# Patient Record
Sex: FEMALE | Race: WHITE | Employment: UNEMPLOYED | URBAN - METROPOLITAN AREA
[De-identification: names, ages, dates, MRNs, and addresses within clinical notes are randomized per-mention and may not be internally consistent; named-entity substitution may affect disease eponyms.]

---

## 2022-01-13 ENCOUNTER — VIRTUAL VISIT (OUTPATIENT)
Dept: INTERNAL MEDICINE | Age: 47
End: 2022-01-13
Payer: MEDICARE

## 2022-01-13 DIAGNOSIS — Z11.52 ENCOUNTER FOR SCREENING FOR COVID-19: Primary | ICD-10-CM

## 2022-01-13 PROCEDURE — G8427 DOCREV CUR MEDS BY ELIG CLIN: HCPCS | Performed by: NURSE PRACTITIONER

## 2022-01-13 PROCEDURE — 99202 OFFICE O/P NEW SF 15 MIN: CPT | Performed by: NURSE PRACTITIONER

## 2022-01-13 ASSESSMENT — ENCOUNTER SYMPTOMS
ABDOMINAL PAIN: 0
RHINORRHEA: 0
NAUSEA: 0
DIARRHEA: 0
SINUS PRESSURE: 0
SHORTNESS OF BREATH: 0
COUGH: 0
SORE THROAT: 0
SINUS PAIN: 0
WHEEZING: 0
VOMITING: 0

## 2022-01-13 NOTE — LETTER
NOTIFICATION RETURN TO WORK / SCHOOL    1/13/2022    Ms. Abad Shaffer  [address] [place] [address]      To Whom It May Concern:    Abad Shaffer was tested for COVID-19 on 1/13, and the result is pending. She may return to work to be determined upon results. I recommend: quarantine    If there are questions or concerns, please have the patient contact our office.         Sincerely,      NAIDA Wilcox NP

## 2022-01-13 NOTE — PROGRESS NOTES
Sherri Gu (:  1975) is a 48 y.o. female, New patient, here for evaluation of the following chief complaint(s):  Concern For COVID-19 (positive for covid at work, needs PCR)      ASSESSMENT/PLAN:  1. Encounter for screening for COVID-19  -     Covid-19 Ambulatory; Future    Will follow up with test results and additional recommendations as indicated. Reviewed usual course of illness, preventing the spread to others/isolation, and supportive measures for symptom management. Reviewed symptoms requiring ER. Patient verbalizes understanding. I have reviewed and updated the electronic medical record. Return if symptoms worsen or fail to improve, for follow up with PCP. SUBJECTIVE/OBJECTIVE:    Patient presents for virtual visit after testing positive for covid. She took a rapid test at work this morning. Her employer is requiring a PCR to confirm. She is asymptomatic at this time. 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 Review of Systems   Constitutional: Negative for chills, fatigue and fever. HENT: Negative for congestion, ear pain, postnasal drip, rhinorrhea, sinus pressure, sinus pain and sore throat. Respiratory: Negative for cough, shortness of breath and wheezing. Cardiovascular: Negative for chest pain. Gastrointestinal: Negative for abdominal pain, diarrhea, nausea and vomiting. Musculoskeletal: Negative for arthralgias and myalgias. Skin: Negative for rash. Neurological: Negative for headaches.        Physical Exam  PHYSICAL EXAMINATION:  [ INSTRUCTIONS:  \"[x]\" Indicates a positive item  \"[]\" Indicates a negative item  -- DELETE ALL ITEMS NOT EXAMINED]    [x] Alert  [x] Oriented to person/place/time      [x] No apparent distress      [x] Breathing appears normal      [x] Normal Mood      [] Poor short term memory  [] Poor long term memory    [] OTHER:      Due to this being a TeleHealth encounter, evaluation of the following organ systems is limited: Vitals/Constitutional/EENT/Resp/CV/GI//MS/Neuro/Skin/Heme-Lymph-Imm. Jayde Du is a 48 y.o. female being evaluated by a Virtual Visit (video visit) encounter to address concerns as mentioned above. A caregiver was present when appropriate. Due to this being a TeleHealth encounter (During ASWSZ-20 public health emergency), evaluation of the following organ systems was limited: Vitals/Constitutional/EENT/Resp/CV/GI//MS/Neuro/Skin/Heme-Lymph-Imm. Pursuant to the emergency declaration under the [address], [address] [place] [address] authority and the [employer] and [employer] Act, this Virtual Visit was conducted with patient's (and/or legal guardian's) consent, to reduce the patient's risk of exposure to COVID-19 and provide necessary medical care. The patient (and/or legal guardian) has also been advised to contact this office for worsening conditions or problems, and seek emergency medical treatment and/or call 911 if deemed necessary. Patient identification was verified at the start of the visit: Yes    Services were provided through a video synchronous discussion virtually to substitute for in-person clinic visit. Patient was located at home and provider was located in office or at home. An electronic signature was used to authenticate this note.     --NAIDA Ratliff

## 2022-01-14 DIAGNOSIS — Z11.52 ENCOUNTER FOR SCREENING FOR COVID-19: ICD-10-CM

## 2022-01-17 LAB
SARS-COV-2: NOT DETECTED
SOURCE: NORMAL

## 2022-01-18 ENCOUNTER — TELEPHONE (OUTPATIENT)
Dept: INTERNAL MEDICINE | Age: 47
End: 2022-01-18

## 2022-01-18 NOTE — TELEPHONE ENCOUNTER
Pt called requesting her results from her COVID test. She has been out of work since 1/13. She is also requesting a return to work letter.     Thank you

## 2022-01-20 NOTE — TELEPHONE ENCOUNTER
Perfect thank you! I tried contacting pt to let her know about letter. Phone message states number or code dialed is incorrect?   She may need to update phone number if she calls back    Thank you

## 2022-09-23 ENCOUNTER — OFFICE VISIT (OUTPATIENT)
Dept: INTERNAL MEDICINE | Age: 47
End: 2022-09-23
Payer: MEDICARE

## 2022-09-23 VITALS
RESPIRATION RATE: 16 BRPM | DIASTOLIC BLOOD PRESSURE: 72 MMHG | BODY MASS INDEX: 35.68 KG/M2 | WEIGHT: 209 LBS | OXYGEN SATURATION: 98 % | TEMPERATURE: 97.5 F | HEART RATE: 98 BPM | HEIGHT: 64 IN | SYSTOLIC BLOOD PRESSURE: 115 MMHG

## 2022-09-23 DIAGNOSIS — Z87.891 PERSONAL HISTORY OF TOBACCO USE: ICD-10-CM

## 2022-09-23 DIAGNOSIS — M54.41 CHRONIC RIGHT-SIDED LOW BACK PAIN WITH RIGHT-SIDED SCIATICA: ICD-10-CM

## 2022-09-23 DIAGNOSIS — G89.29 CHRONIC RIGHT-SIDED LOW BACK PAIN WITH RIGHT-SIDED SCIATICA: ICD-10-CM

## 2022-09-23 DIAGNOSIS — I99.8 FLUCTUATING BLOOD PRESSURE: ICD-10-CM

## 2022-09-23 DIAGNOSIS — I99.8 FLUCTUATING BLOOD PRESSURE: Primary | ICD-10-CM

## 2022-09-23 DIAGNOSIS — Z23 NEED FOR PNEUMOCOCCAL VACCINATION: ICD-10-CM

## 2022-09-23 LAB
ALBUMIN SERPL-MCNC: 4.5 G/DL (ref 3.5–4.6)
ALP BLD-CCNC: 58 U/L (ref 40–130)
ALT SERPL-CCNC: 19 U/L (ref 0–33)
ANION GAP SERPL CALCULATED.3IONS-SCNC: 13 MEQ/L (ref 9–15)
AST SERPL-CCNC: 14 U/L (ref 0–35)
BASOPHILS ABSOLUTE: 0.1 K/UL (ref 0–0.2)
BASOPHILS RELATIVE PERCENT: 0.9 %
BILIRUB SERPL-MCNC: 0.5 MG/DL (ref 0.2–0.7)
BUN BLDV-MCNC: 13 MG/DL (ref 6–20)
CALCIUM SERPL-MCNC: 9.6 MG/DL (ref 8.5–9.9)
CHLORIDE BLD-SCNC: 103 MEQ/L (ref 95–107)
CO2: 21 MEQ/L (ref 20–31)
CREAT SERPL-MCNC: 0.68 MG/DL (ref 0.5–0.9)
EOSINOPHILS ABSOLUTE: 0.3 K/UL (ref 0–0.7)
EOSINOPHILS RELATIVE PERCENT: 3.3 %
GFR AFRICAN AMERICAN: >60
GFR NON-AFRICAN AMERICAN: >60
GLOBULIN: 2.8 G/DL (ref 2.3–3.5)
GLUCOSE BLD-MCNC: 100 MG/DL (ref 70–99)
HCT VFR BLD CALC: 43.6 % (ref 37–47)
HEMOGLOBIN: 14.3 G/DL (ref 12–16)
LYMPHOCYTES ABSOLUTE: 2.3 K/UL (ref 1–4.8)
LYMPHOCYTES RELATIVE PERCENT: 26.9 %
MCH RBC QN AUTO: 30.9 PG (ref 27–31.3)
MCHC RBC AUTO-ENTMCNC: 32.9 % (ref 33–37)
MCV RBC AUTO: 94 FL (ref 82–100)
MONOCYTES ABSOLUTE: 0.7 K/UL (ref 0.2–0.8)
MONOCYTES RELATIVE PERCENT: 8.1 %
NEUTROPHILS ABSOLUTE: 5.3 K/UL (ref 1.4–6.5)
NEUTROPHILS RELATIVE PERCENT: 60.8 %
PDW BLD-RTO: 14.1 % (ref 11.5–14.5)
PLATELET # BLD: 359 K/UL (ref 130–400)
POTASSIUM SERPL-SCNC: 4.4 MEQ/L (ref 3.4–4.9)
RBC # BLD: 4.64 M/UL (ref 4.2–5.4)
SODIUM BLD-SCNC: 137 MEQ/L (ref 135–144)
TOTAL PROTEIN: 7.3 G/DL (ref 6.3–8)
WBC # BLD: 8.7 K/UL (ref 4.8–10.8)

## 2022-09-23 PROCEDURE — 4004F PT TOBACCO SCREEN RCVD TLK: CPT | Performed by: PHYSICIAN ASSISTANT

## 2022-09-23 PROCEDURE — 90471 IMMUNIZATION ADMIN: CPT | Performed by: PHYSICIAN ASSISTANT

## 2022-09-23 PROCEDURE — G8427 DOCREV CUR MEDS BY ELIG CLIN: HCPCS | Performed by: PHYSICIAN ASSISTANT

## 2022-09-23 PROCEDURE — 99214 OFFICE O/P EST MOD 30 MIN: CPT | Performed by: PHYSICIAN ASSISTANT

## 2022-09-23 PROCEDURE — G8417 CALC BMI ABV UP PARAM F/U: HCPCS | Performed by: PHYSICIAN ASSISTANT

## 2022-09-23 PROCEDURE — 90732 PPSV23 VACC 2 YRS+ SUBQ/IM: CPT | Performed by: PHYSICIAN ASSISTANT

## 2022-09-23 RX ORDER — CLONIDINE HYDROCHLORIDE 0.1 MG/1
0.1 TABLET ORAL 2 TIMES DAILY
Qty: 60 TABLET | Refills: 0 | Status: SHIPPED | OUTPATIENT
Start: 2022-09-23

## 2022-09-23 RX ORDER — CYCLOBENZAPRINE HCL 5 MG
5 TABLET ORAL 3 TIMES DAILY PRN
Qty: 30 TABLET | Refills: 0 | Status: SHIPPED | OUTPATIENT
Start: 2022-09-23 | End: 2022-10-03

## 2022-09-23 RX ORDER — METHYLPREDNISOLONE 4 MG/1
TABLET ORAL
Qty: 1 KIT | Refills: 0 | Status: SHIPPED | OUTPATIENT
Start: 2022-09-23 | End: 2022-09-29

## 2022-09-23 SDOH — ECONOMIC STABILITY: FOOD INSECURITY: WITHIN THE PAST 12 MONTHS, THE FOOD YOU BOUGHT JUST DIDN'T LAST AND YOU DIDN'T HAVE MONEY TO GET MORE.: NEVER TRUE

## 2022-09-23 SDOH — ECONOMIC STABILITY: FOOD INSECURITY: WITHIN THE PAST 12 MONTHS, YOU WORRIED THAT YOUR FOOD WOULD RUN OUT BEFORE YOU GOT MONEY TO BUY MORE.: NEVER TRUE

## 2022-09-23 ASSESSMENT — ENCOUNTER SYMPTOMS
BACK PAIN: 1
RESPIRATORY NEGATIVE: 1
GASTROINTESTINAL NEGATIVE: 1

## 2022-09-23 ASSESSMENT — PATIENT HEALTH QUESTIONNAIRE - PHQ9
SUM OF ALL RESPONSES TO PHQ QUESTIONS 1-9: 0
2. FEELING DOWN, DEPRESSED OR HOPELESS: 0
SUM OF ALL RESPONSES TO PHQ9 QUESTIONS 1 & 2: 0
1. LITTLE INTEREST OR PLEASURE IN DOING THINGS: 0

## 2022-09-23 ASSESSMENT — SOCIAL DETERMINANTS OF HEALTH (SDOH): HOW HARD IS IT FOR YOU TO PAY FOR THE VERY BASICS LIKE FOOD, HOUSING, MEDICAL CARE, AND HEATING?: NOT HARD AT ALL

## 2022-09-23 NOTE — PROGRESS NOTES
David Strickland (: 1975) is a 50 y.o. female, Established patient, here for evaluation of the following chief complaint(s):  New Patient, Hypertension (Pt was at work and her bp was 180/80 1 week ago), Dizziness (Started about 1 week ago, same day her bp was elevated), and Leg Pain (When she walk she feels like she has bricks in her shoes and she has pain at night and has trouble sleeping)        ASSESSMENT/PLAN:  1. Fluctuating blood pressure  - b/o normal today   - will have her monitor at home   - will give some Catapres to take , with instructions    - CBC with Auto Differential; Future  - Comprehensive Metabolic Panel; Future  - cloNIDine (CATAPRES) 0.1 MG tablet; Take 1 tablet by mouth 2 times daily As needed for b/p > 150/ 90  Dispense: 60 tablet; Refill: 0  - Discussed risk, benefits, and alternatives of medications. Explained the importance of risk factor modification and the importance of compliance for better outcomes. Emphasized the importance of diet and exercise, and avoiding salt and saturated fat in the diet    2. Chronic right-sided low back pain with right-sided sciatica  - light stretching as she feels better   - can refer to some physical therapy if the pain returns   - cyclobenzaprine (FLEXERIL) 5 MG tablet; Take 1 tablet by mouth 3 times daily as needed for Muscle spasms  Dispense: 30 tablet; Refill: 0  - methylPREDNISolone (MEDROL DOSEPACK) 4 MG tablet; Take by mouth. Dispense: 1 kit; Refill: 0    3. Personal history of tobacco use  - Pneumococcal, PPSV23, PNEUMOVAX 23, (age 2 yrs+), SC/IM    4. Need for pneumococcal vaccination  - Pneumococcal, PPSV23, PNEUMOVAX 23, (age 2 yrs+), SC/IM      Return in about 4 weeks (around 10/21/2022) for annual with fasting labs.     SUBJECTIVE/OBJECTIVE:  HPI      Elevated blood pressure , one week ago, while at work  States she was having dizziness  B/p reading was 180/80   She is also having heaviness to the legs at night, that is causing her not to sleep   Pain started in the right low back and radiates down to the calf         Review of Systems   Constitutional: Negative. HENT: Negative. Respiratory: Negative. Cardiovascular: Negative. Gastrointestinal: Negative. Endocrine: Negative. Genitourinary: Negative. Musculoskeletal:  Positive for arthralgias, back pain, gait problem and myalgias. Negative for neck pain and neck stiffness. Skin: Negative. All other systems reviewed and are negative. Physical Exam  Vitals reviewed. Constitutional:       Appearance: Normal appearance. HENT:      Head: Normocephalic and atraumatic. Eyes:      Extraocular Movements: Extraocular movements intact. Conjunctiva/sclera: Conjunctivae normal.      Pupils: Pupils are equal, round, and reactive to light. Cardiovascular:      Rate and Rhythm: Normal rate and regular rhythm. Heart sounds: Normal heart sounds. Pulmonary:      Effort: Pulmonary effort is normal.      Breath sounds: Normal breath sounds. Musculoskeletal:         General: Normal range of motion. Thoracic back: Normal.      Lumbar back: Normal.   Neurological:      General: No focal deficit present. Mental Status: She is alert and oriented to person, place, and time. Psychiatric:         Mood and Affect: Mood normal.         Behavior: Behavior normal.         Thought Content: Thought content normal.         Judgment: Judgment normal.       Vitals:    09/23/22 1031   BP: 115/72   Site: Left Upper Arm   Position: Sitting   Cuff Size: Large Adult   Pulse: 98   Resp: 16   Temp: 97.5 °F (36.4 °C)   SpO2: 98%   Weight: 209 lb (94.8 kg)   Height: 5' 4\" (1.626 m)                 An electronic signature was used to authenticate this note.     --REE Thrasher

## 2022-09-23 NOTE — PROGRESS NOTES
After obtaining consent, and per orders of Dr. Miranda Ram, injection of Pneumovax 23 given in Right deltoid by Justin Aleman MA. Patient instructed to remain in clinic for 20 minutes afterwards, and to report any adverse reaction to me immediately.

## 2023-03-05 ENCOUNTER — HOSPITAL ENCOUNTER (EMERGENCY)
Age: 48
Discharge: HOME | End: 2023-03-05
Attending: EMERGENCY MEDICINE
Payer: MEDICAID

## 2023-03-05 VITALS
DIASTOLIC BLOOD PRESSURE: 85 MMHG | SYSTOLIC BLOOD PRESSURE: 116 MMHG | RESPIRATION RATE: 16 BRPM | HEART RATE: 78 BPM | TEMPERATURE: 98.5 F | OXYGEN SATURATION: 100 %

## 2023-03-05 DIAGNOSIS — F32.A DEPRESSION, UNSPECIFIED DEPRESSION TYPE: Primary | ICD-10-CM

## 2023-03-05 LAB
ANION GAP SERPL CALCULATED.3IONS-SCNC: 11 MEQ/L (ref 9–15)
BASOPHILS ABSOLUTE: 0.1 K/UL (ref 0–0.1)
BASOPHILS RELATIVE PERCENT: 0.4 % (ref 0.1–1.2)
BUN BLDV-MCNC: 12 MG/DL (ref 6–20)
CALCIUM SERPL-MCNC: 9.6 MG/DL (ref 8.5–9.9)
CHLORIDE BLD-SCNC: 100 MEQ/L (ref 95–107)
CO2: 24 MEQ/L (ref 20–31)
CREAT SERPL-MCNC: 0.54 MG/DL (ref 0.5–0.9)
EOSINOPHILS ABSOLUTE: 0.1 K/UL (ref 0–0.4)
EOSINOPHILS RELATIVE PERCENT: 0.8 % (ref 0.7–5.8)
GFR SERPL CREATININE-BSD FRML MDRD: >60 ML/MIN/{1.73_M2}
GLUCOSE BLD-MCNC: 121 MG/DL (ref 70–99)
HBA1C MFR BLD: 5.7 % (ref 4.8–5.9)
HCT VFR BLD CALC: 45.6 % (ref 37–47)
HEMOGLOBIN: 15.3 G/DL (ref 11.2–15.7)
IMMATURE GRANULOCYTES #: 0.1 K/UL
IMMATURE GRANULOCYTES %: 0.5 %
LYMPHOCYTES ABSOLUTE: 1.7 K/UL (ref 1.2–3.7)
LYMPHOCYTES RELATIVE PERCENT: 11.8 %
MCH RBC QN AUTO: 31 PG (ref 25.6–32.2)
MCHC RBC AUTO-ENTMCNC: 33.6 % (ref 32.2–35.5)
MCV RBC AUTO: 92.5 FL (ref 79.4–94.8)
MONOCYTES ABSOLUTE: 0.8 K/UL (ref 0.2–0.9)
MONOCYTES RELATIVE PERCENT: 5.7 % (ref 4.7–12.5)
NEUTROPHILS ABSOLUTE: 11.4 K/UL (ref 1.6–6.1)
NEUTROPHILS RELATIVE PERCENT: 80.8 % (ref 34–71.1)
PDW BLD-RTO: 13 % (ref 11.7–14.4)
PLATELET # BLD: 367 K/UL (ref 182–369)
POTASSIUM SERPL-SCNC: 4.2 MEQ/L (ref 3.4–4.9)
RBC # BLD: 4.93 M/UL (ref 3.93–5.22)
SODIUM BLD-SCNC: 135 MEQ/L (ref 135–144)
WBC # BLD: 14.1 K/UL (ref 4–10)

## 2023-03-05 PROCEDURE — 2580000003 HC RX 258: Performed by: EMERGENCY MEDICINE

## 2023-03-05 PROCEDURE — 2500000003 HC RX 250 WO HCPCS: Performed by: EMERGENCY MEDICINE

## 2023-03-05 PROCEDURE — 83036 HEMOGLOBIN GLYCOSYLATED A1C: CPT

## 2023-03-05 PROCEDURE — 99284 EMERGENCY DEPT VISIT MOD MDM: CPT

## 2023-03-05 PROCEDURE — 80048 BASIC METABOLIC PNL TOTAL CA: CPT

## 2023-03-05 PROCEDURE — 85025 COMPLETE CBC W/AUTO DIFF WBC: CPT

## 2023-03-05 PROCEDURE — 96374 THER/PROPH/DIAG INJ IV PUSH: CPT

## 2023-03-05 PROCEDURE — 36415 COLL VENOUS BLD VENIPUNCTURE: CPT

## 2023-03-05 PROCEDURE — 6360000002 HC RX W HCPCS: Performed by: EMERGENCY MEDICINE

## 2023-03-05 PROCEDURE — A4216 STERILE WATER/SALINE, 10 ML: HCPCS | Performed by: EMERGENCY MEDICINE

## 2023-03-05 RX ORDER — 0.9 % SODIUM CHLORIDE 0.9 %
1000 INTRAVENOUS SOLUTION INTRAVENOUS ONCE
Status: COMPLETED | OUTPATIENT
Start: 2023-03-05 | End: 2023-03-05

## 2023-03-05 RX ORDER — ONDANSETRON 2 MG/ML
4 INJECTION INTRAMUSCULAR; INTRAVENOUS ONCE
Status: COMPLETED | OUTPATIENT
Start: 2023-03-05 | End: 2023-03-05

## 2023-03-05 RX ORDER — CLINDAMYCIN HYDROCHLORIDE 300 MG/1
300 CAPSULE ORAL 3 TIMES DAILY
Qty: 30 CAPSULE | Refills: 0 | Status: SHIPPED | OUTPATIENT
Start: 2023-03-05 | End: 2023-03-15

## 2023-03-05 RX ORDER — METHYLPREDNISOLONE SODIUM SUCCINATE 125 MG/2ML
125 INJECTION, POWDER, LYOPHILIZED, FOR SOLUTION INTRAMUSCULAR; INTRAVENOUS ONCE
Status: COMPLETED | OUTPATIENT
Start: 2023-03-05 | End: 2023-03-05

## 2023-03-05 RX ORDER — OXYCODONE HYDROCHLORIDE AND ACETAMINOPHEN 5; 325 MG/1; MG/1
1 TABLET ORAL EVERY 6 HOURS PRN
Qty: 12 TABLET | Refills: 0 | Status: SHIPPED | OUTPATIENT
Start: 2023-03-05 | End: 2023-03-08

## 2023-03-05 RX ADMIN — FAMOTIDINE 40 MG: 10 INJECTION, SOLUTION INTRAVENOUS at 12:22

## 2023-03-05 RX ADMIN — ONDANSETRON 4 MG: 2 INJECTION INTRAMUSCULAR; INTRAVENOUS at 11:33

## 2023-03-05 RX ADMIN — HYDROMORPHONE HYDROCHLORIDE 1 MG: 1 INJECTION, SOLUTION INTRAMUSCULAR; INTRAVENOUS; SUBCUTANEOUS at 11:32

## 2023-03-05 RX ADMIN — METHYLPREDNISOLONE SODIUM SUCCINATE 125 MG: 125 INJECTION, POWDER, FOR SOLUTION INTRAMUSCULAR; INTRAVENOUS at 11:33

## 2023-03-05 RX ADMIN — SODIUM CHLORIDE 1000 ML: 9 INJECTION, SOLUTION INTRAVENOUS at 11:32

## 2023-03-05 ASSESSMENT — ENCOUNTER SYMPTOMS
VOMITING: 0
BACK PAIN: 0
SORE THROAT: 0
NAUSEA: 0
EYE REDNESS: 0
EYE DISCHARGE: 0
ABDOMINAL PAIN: 0
COUGH: 0
FACIAL SWELLING: 1
SHORTNESS OF BREATH: 0

## 2023-03-05 ASSESSMENT — PAIN DESCRIPTION - LOCATION: LOCATION: FACE

## 2023-03-05 ASSESSMENT — PAIN SCALES - GENERAL: PAINLEVEL_OUTOF10: 8

## 2023-03-05 ASSESSMENT — PAIN - FUNCTIONAL ASSESSMENT: PAIN_FUNCTIONAL_ASSESSMENT: 0-10

## 2023-03-05 ASSESSMENT — PAIN DESCRIPTION - ORIENTATION: ORIENTATION: RIGHT

## 2023-03-05 NOTE — ED NOTES
IV fluids completed. Patient requesting medication for heartburn. Dr. Cheo Crespo made aware of the above.  Electronically signed by Alfredito Garcia RN on 3/5/2023 at 12:19 PM       Alfredito Garcia RN  03/05/23 6898

## 2023-03-05 NOTE — ED PROVIDER NOTES
[facility] ED  EMERGENCY DEPARTMENT ENCOUNTER      Pt Name: Antonieta Ramírez  MRN: [id number]  [place] 1975  Date of evaluation: 3/5/2023  Provider: Edilberto Felder DO        HISTORY OF PRESENT ILLNESS    Antonieta Ramírez is a 50 y.o. female per chart review has ah/o HTN. The history is provided by the patient. Dental Pain  Location:  Upper  Upper teeth location:  1/RU 3rd molar  Quality:  Aching  Severity:  Moderate  Onset quality:  Sudden  Timing:  Constant  Progression:  Unchanged  Chronicity:  New  Context: dental caries    Relieved by:  Nothing  Worsened by:  Nothing  Ineffective treatments:  None tried  Associated symptoms: facial pain, facial swelling and gum swelling    Associated symptoms: no fever and no neck pain    Risk factors: lack of dental care and periodontal disease    Risk factors: no immunosuppression           REVIEW OF SYSTEMS       Review of Systems   Constitutional:  Negative for chills and fever. HENT:  Positive for facial swelling. Negative for ear pain and sore throat. Eyes:  Negative for discharge and redness. Respiratory:  Negative for cough and shortness of breath. Cardiovascular:  Negative for chest pain and palpitations. Gastrointestinal:  Negative for abdominal pain, nausea and vomiting. Genitourinary:  Negative for difficulty urinating and dysuria. Musculoskeletal:  Negative for back pain and neck pain. Skin:  Negative for rash and wound. Neurological:  Negative for dizziness and syncope. Psychiatric/Behavioral:  Negative for confusion. The patient is not nervous/anxious. All other systems reviewed and are negative. Except as noted above the remainder of the review of systems was reviewed and negative. PAST MEDICAL HISTORY   No past medical history on file.       SURGICAL HISTORY       Past Surgical History:   Procedure Laterality Date    CHOLECYSTECTOMY           CURRENT MEDICATIONS       Discharge Medication List as of 3/5/2023 12:37 PM        CONTINUE these medications which have NOT CHANGED    Details   cloNIDine (CATAPRES) 0.1 MG tablet Take 1 tablet by mouth 2 times daily As needed for b/p > 150/ 90, Disp-60 tablet, R-0Normal             ALLERGIES     Aspirin    FAMILY HISTORY     No family history on file. SOCIAL HISTORY       Social History     Socioeconomic History    Marital status: Single   Tobacco Use    Smoking status: Every Day     Packs/day: 0.50     Types: Cigarettes    Smokeless tobacco: Never   Substance and Sexual Activity    Alcohol use: No     Social Determinants of Health     Financial Resource Strain: Low Risk     Difficulty of Paying Living Expenses: Not hard at all   Food Insecurity: No Food Insecurity    Worried About Histogen in the Last Year: Never true    Ran Out of Food in the Last Year: Never true         PHYSICAL EXAM       ED Triage Vitals [03/05/23 1019]   BP Temp Temp src Heart Rate Resp SpO2 Height Weight   116/85 98.5 °F (36.9 °C) -- (!) 107 16 100 % -- --       Physical Exam  Vitals and nursing note reviewed. Constitutional:       Appearance: Normal appearance. HENT:      Head: Normocephalic and atraumatic. Jaw: Swelling present. Right Ear: Tympanic membrane normal.      Left Ear: Tympanic membrane normal.      Nose: Nose normal.      Mouth/Throat:      Mouth: Mucous membranes are moist.      Dentition: Dental tenderness, gingival swelling and dental caries present. Pharynx: Oropharynx is clear. Eyes:      General: Lids are normal.      Extraocular Movements: Extraocular movements intact. Conjunctiva/sclera: Conjunctivae normal.      Pupils: Pupils are equal, round, and reactive to light. Cardiovascular:      Rate and Rhythm: Regular rhythm. Tachycardia present. Pulses: Normal pulses. Heart sounds: Normal heart sounds. Pulmonary:      Effort: Pulmonary effort is normal.      Breath sounds: Normal breath sounds.    Abdominal:      General: Abdomen is flat. Bowel sounds are normal.      Palpations: Abdomen is soft. Musculoskeletal:         General: Normal range of motion. Cervical back: Full passive range of motion without pain, normal range of motion and neck supple. Skin:     General: Skin is warm. Capillary Refill: Capillary refill takes less than 2 seconds. Neurological:      General: No focal deficit present. Mental Status: She is alert and oriented to person, place, and time. Deep Tendon Reflexes: Reflexes are normal and symmetric. Psychiatric:         Attention and Perception: Attention and perception normal.         Mood and Affect: Mood normal.         Behavior: Behavior normal. Behavior is cooperative. LABS:  Labs Reviewed   CBC WITH AUTO DIFFERENTIAL - Abnormal; Notable for the following components:       Result Value    WBC 14.1 (*)     Neutrophils % 80.8 (*)     Neutrophils Absolute 11.4 (*)     All other components within normal limits   BASIC METABOLIC PANEL - Abnormal; Notable for the following components:    Glucose 121 (*)     All other components within normal limits   HEMOGLOBIN A1C         MDM:   Vitals:    Vitals:    03/05/23 1019 03/05/23 1241   BP: 116/85    Pulse: (!) 107 78   Resp: 16    Temp: 98.5 °F (36.9 °C)    SpO2: 100%        MDM  Number of Diagnoses or Management Options  Depression, unspecified depression type  Diagnosis management comments: Patient presents with dental pain. Her face is swollen on exam on the left side. Labs ordered, 1 liter IVF NS bolus, solumedrol 125mg IV, zofran 4mg IV, dilaudid 1mg IV.        Amount and/or Complexity of Data Reviewed  Clinical lab tests: ordered and reviewed         No orders to display         EKG Interpretation    Interpreted by emergency department physician    Rhythm: normal sinus   Rate: normal  Axis: normal  Ectopy: none  Conduction: 1st degree AV block  ST Segments: nonspecific changes  T Waves: non specific changes  Q Waves: none    Clinical Impression: non-specific EKG    OTILIA CASILLAS DO     The lab results, radiology and test results were reviewed with the patient and family.  The patient will follow up in 2 days with their primary care doctor.  If their symptoms change or get worse they will return to the ER.    CRITICAL CARE TIME   Total CriticalCare time was 0 minutes, excluding separately reportable procedures.  There was a high probability of clinically significant/life threatening deterioration in the patient's condition which required my urgent intervention.        PROCEDURES:  Unlessotherwise noted below, none     Procedures      FINAL IMPRESSION      1. Depression, unspecified depression type          DISPOSITION/PLAN   DISPOSITION Decision To Discharge 03/05/2023 12:25:20 PM          OTILIA CASILLAS DO (electronically signed)  Attending Emergency Physician          Otilia Casillas DO  03/07/23 1041

## 2023-03-21 ENCOUNTER — OFFICE VISIT (OUTPATIENT)
Dept: INTERNAL MEDICINE | Age: 48
End: 2023-03-21
Payer: MEDICAID

## 2023-03-21 VITALS
HEIGHT: 64 IN | DIASTOLIC BLOOD PRESSURE: 84 MMHG | RESPIRATION RATE: 16 BRPM | OXYGEN SATURATION: 99 % | WEIGHT: 201 LBS | BODY MASS INDEX: 34.31 KG/M2 | TEMPERATURE: 97.2 F | SYSTOLIC BLOOD PRESSURE: 158 MMHG | HEART RATE: 106 BPM

## 2023-03-21 DIAGNOSIS — M54.10 RADICULOPATHY WITH LOWER EXTREMITY SYMPTOMS: Primary | ICD-10-CM

## 2023-03-21 PROCEDURE — 99213 OFFICE O/P EST LOW 20 MIN: CPT | Performed by: PHYSICIAN ASSISTANT

## 2023-03-21 SDOH — ECONOMIC STABILITY: FOOD INSECURITY: WITHIN THE PAST 12 MONTHS, YOU WORRIED THAT YOUR FOOD WOULD RUN OUT BEFORE YOU GOT MONEY TO BUY MORE.: NEVER TRUE

## 2023-03-21 SDOH — ECONOMIC STABILITY: FOOD INSECURITY: WITHIN THE PAST 12 MONTHS, THE FOOD YOU BOUGHT JUST DIDN'T LAST AND YOU DIDN'T HAVE MONEY TO GET MORE.: NEVER TRUE

## 2023-03-21 SDOH — ECONOMIC STABILITY: INCOME INSECURITY: HOW HARD IS IT FOR YOU TO PAY FOR THE VERY BASICS LIKE FOOD, HOUSING, MEDICAL CARE, AND HEATING?: NOT HARD AT ALL

## 2023-03-21 SDOH — ECONOMIC STABILITY: HOUSING INSECURITY
IN THE LAST 12 MONTHS, WAS THERE A TIME WHEN YOU DID NOT HAVE A STEADY PLACE TO SLEEP OR SLEPT IN A SHELTER (INCLUDING NOW)?: NO

## 2023-03-21 ASSESSMENT — PATIENT HEALTH QUESTIONNAIRE - PHQ9
1. LITTLE INTEREST OR PLEASURE IN DOING THINGS: 0
SUM OF ALL RESPONSES TO PHQ QUESTIONS 1-9: 0
2. FEELING DOWN, DEPRESSED OR HOPELESS: 0
SUM OF ALL RESPONSES TO PHQ QUESTIONS 1-9: 0
SUM OF ALL RESPONSES TO PHQ9 QUESTIONS 1 & 2: 0

## 2023-03-21 NOTE — PROGRESS NOTES
Adult Large Adult Large Adult   Pulse:   (!) 106   Resp:   16   Temp:   97.2 °F (36.2 °C)   SpO2:   99%   Weight:   201 lb (91.2 kg)   Height:   5' 4\" (1.626 m)                 An electronic signature was used to authenticate this note.     --REE Li

## 2023-03-29 ENCOUNTER — HOSPITAL ENCOUNTER (OUTPATIENT)
Age: 48
Discharge: HOME | End: 2023-03-31
Payer: MEDICAID

## 2023-03-29 ENCOUNTER — HOSPITAL ENCOUNTER (OUTPATIENT)
Dept: GENERAL RADIOLOGY | Age: 48
Discharge: HOME | End: 2023-03-31
Payer: MEDICAID

## 2023-03-29 DIAGNOSIS — M54.10 RADICULOPATHY WITH LOWER EXTREMITY SYMPTOMS: ICD-10-CM

## 2023-03-29 PROCEDURE — 72100 X-RAY EXAM L-S SPINE 2/3 VWS: CPT

## 2023-03-30 ASSESSMENT — ENCOUNTER SYMPTOMS: RESPIRATORY NEGATIVE: 1

## 2023-04-06 DIAGNOSIS — M54.10 RADICULOPATHY WITH LOWER EXTREMITY SYMPTOMS: Primary | ICD-10-CM

## 2023-05-02 ENCOUNTER — OFFICE VISIT (OUTPATIENT)
Dept: ORTHOPEDIC SURGERY | Age: 48
End: 2023-05-02

## 2023-05-02 ENCOUNTER — HOSPITAL ENCOUNTER (OUTPATIENT)
Dept: ORTHOPEDIC SURGERY | Age: 48
Discharge: HOME | End: 2023-05-04
Payer: MEDICAID

## 2023-05-02 VITALS — HEIGHT: 64 IN | TEMPERATURE: 97.6 F | BODY MASS INDEX: 34.31 KG/M2 | WEIGHT: 201 LBS

## 2023-05-02 DIAGNOSIS — Z76.89 ENCOUNTER TO ESTABLISH CARE: ICD-10-CM

## 2023-05-02 DIAGNOSIS — M54.50 LOW BACK PAIN, UNSPECIFIED BACK PAIN LATERALITY, UNSPECIFIED CHRONICITY, UNSPECIFIED WHETHER SCIATICA PRESENT: Primary | ICD-10-CM

## 2023-05-02 DIAGNOSIS — M54.50 LOW BACK PAIN, UNSPECIFIED BACK PAIN LATERALITY, UNSPECIFIED CHRONICITY, UNSPECIFIED WHETHER SCIATICA PRESENT: ICD-10-CM

## 2023-05-02 PROCEDURE — 72110 X-RAY EXAM L-2 SPINE 4/>VWS: CPT

## 2023-05-02 NOTE — PROGRESS NOTES
Subjective:      Patient ID: Barbie Vernon is a 50 y.o. female who presents today for:  Chief Complaint   Patient presents with    New Patient     Patient presents today for a new patient apt for lower extremity pain. She states this is pain, numbness and weakness in her legs and back. There was no injury. This pain does radiate from her left to her back and her back down both legs. She has a hard time sleeping at night. Sx are worse while sitting and walking. She has tried NSAIDS for pain. She is a smokers. HPI:  The patient is a 61-year-old female. She is diabetic. She is here with low back pain and bilateral radiculopathy to her feet. She states that she gets more pain when she walks and this is relieved with sitting down. She does smoke. This has been a problem for her for 2 years. She describes pain numbness and weakness. No injury. It disturbs her sleep. Sitting and walking make it worse. She has tried Advil, ibuprofen, Tylenol. No bowel or bladder complaints. The patient is referred by REE Humphries. No past medical history on file. Past Surgical History:   Procedure Laterality Date    CHOLECYSTECTOMY         Tobacco Use      Smoking status: Every Day        Packs/day: 0.50        Types: Cigarettes      Smokeless tobacco: Never     has no history on file for drug use. Allergies   Allergen Reactions    Aspirin        Current Outpatient Medications:     cloNIDine (CATAPRES) 0.1 MG tablet, Take 1 tablet by mouth 2 times daily As needed for b/p > 150/ 90, Disp: 60 tablet, Rfl: 0    Objective:   Temp 97.6 °F (36.4 °C) (Temporal)   Ht 5' 4\" (1.626 m)   Wt 201 lb (91.2 kg)   BMI 34.50 kg/m²     Physical Exam:  The patient can rise up on their toes and rise up on her heels. 5 out of 5 hip flexion and knee extension strength bilaterally. Sensation intact bilaterally in the lower extremities from L2-S1.      Radiographs and Laboratory Studies:     Diagnostic Imaging Studies:    XR

## 2023-06-04 ENCOUNTER — HOSPITAL ENCOUNTER (OUTPATIENT)
Dept: MRI IMAGING | Age: 48
Discharge: HOME | End: 2023-06-06
Payer: MEDICAID

## 2023-06-04 DIAGNOSIS — M54.50 LOW BACK PAIN, UNSPECIFIED BACK PAIN LATERALITY, UNSPECIFIED CHRONICITY, UNSPECIFIED WHETHER SCIATICA PRESENT: ICD-10-CM

## 2023-06-04 PROCEDURE — 72148 MRI LUMBAR SPINE W/O DYE: CPT

## 2023-06-05 ENCOUNTER — PATIENT MESSAGE (OUTPATIENT)
Dept: ORTHOPEDIC SURGERY | Age: 48
End: 2023-06-05

## 2023-07-14 NOTE — TELEPHONE ENCOUNTER
From: Ramo Morales  To: Dr. Lanier Mutter: 6/5/2023 8:53 PM EDT  Subject: Question regarding MRI Lumbar Spine Without Contrast    What happens next?is it serious and what can I do for my legs it's still extremely painful to [facility] you

## 2023-08-11 ENCOUNTER — TELEPHONE (OUTPATIENT)
Dept: INTERNAL MEDICINE | Age: 48
End: 2023-08-11

## 2025-06-10 ENCOUNTER — APPOINTMENT (OUTPATIENT)
Dept: CT IMAGING | Age: 50
End: 2025-06-10

## 2025-06-10 ENCOUNTER — HOSPITAL ENCOUNTER (INPATIENT)
Age: 50
LOS: 3 days | Discharge: NURSING, REHAB OR LONG-TERM CARE | DRG: 065 | End: 2025-06-14
Attending: INTERNAL MEDICINE | Admitting: INTERNAL MEDICINE

## 2025-06-10 ENCOUNTER — HOSPITAL ENCOUNTER (EMERGENCY)
Age: 50
Discharge: ANOTHER HOSPITAL | End: 2025-06-10

## 2025-06-10 ENCOUNTER — APPOINTMENT (OUTPATIENT)
Dept: GENERAL RADIOLOGY | Age: 50
End: 2025-06-10

## 2025-06-10 VITALS
RESPIRATION RATE: 15 BRPM | DIASTOLIC BLOOD PRESSURE: 82 MMHG | TEMPERATURE: 98.3 F | WEIGHT: 214.3 LBS | OXYGEN SATURATION: 97 % | HEART RATE: 75 BPM | BODY MASS INDEX: 36.78 KG/M2 | SYSTOLIC BLOOD PRESSURE: 131 MMHG

## 2025-06-10 DIAGNOSIS — R07.9 CHEST PAIN, UNSPECIFIED TYPE: ICD-10-CM

## 2025-06-10 DIAGNOSIS — I63.9 CEREBROVASCULAR ACCIDENT (CVA), UNSPECIFIED MECHANISM (HCC): Primary | ICD-10-CM

## 2025-06-10 DIAGNOSIS — E27.8 LEFT ADRENAL MASS: ICD-10-CM

## 2025-06-10 DIAGNOSIS — K76.9 LESION OF RIGHT LOBE OF LIVER: ICD-10-CM

## 2025-06-10 DIAGNOSIS — G45.9 TIA (TRANSIENT ISCHEMIC ATTACK): Primary | ICD-10-CM

## 2025-06-10 LAB
ALBUMIN SERPL-MCNC: 4.3 G/DL (ref 3.5–4.6)
ALP SERPL-CCNC: 75 U/L (ref 40–130)
ALT SERPL-CCNC: 24 U/L (ref 0–33)
ANION GAP SERPL CALCULATED.3IONS-SCNC: 12 MEQ/L (ref 9–15)
APTT PPP: 29.3 SEC (ref 24.4–36.8)
AST SERPL-CCNC: 23 U/L (ref 0–35)
BACTERIA URNS QL MICRO: ABNORMAL /HPF
BASOPHILS # BLD: 0 K/UL (ref 0–0.1)
BASOPHILS NFR BLD: 0.5 % (ref 0.1–1.2)
BILIRUB SERPL-MCNC: 0.4 MG/DL (ref 0.2–0.7)
BILIRUB UR QL STRIP: NEGATIVE
BUN SERPL-MCNC: 18 MG/DL (ref 6–20)
CALCIUM SERPL-MCNC: 9.3 MG/DL (ref 8.5–9.9)
CHLORIDE SERPL-SCNC: 104 MEQ/L (ref 95–107)
CHP ED QC CHECK: NORMAL
CLARITY UR: CLEAR
CO2 SERPL-SCNC: 23 MEQ/L (ref 20–31)
COLOR UR: YELLOW
CREAT SERPL-MCNC: 0.7 MG/DL (ref 0.5–0.9)
EOSINOPHIL # BLD: 0.3 K/UL (ref 0–0.4)
EOSINOPHIL NFR BLD: 3.4 % (ref 0.7–5.8)
EPI CELLS #/AREA URNS HPF: ABNORMAL /HPF
ERYTHROCYTE [DISTWIDTH] IN BLOOD BY AUTOMATED COUNT: 13.1 % (ref 11.7–14.4)
GLOBULIN SER CALC-MCNC: 2.5 G/DL (ref 2.3–3.5)
GLUCOSE BLD-MCNC: 125 MG/DL
GLUCOSE BLD-MCNC: 125 MG/DL (ref 70–99)
GLUCOSE SERPL-MCNC: 104 MG/DL (ref 70–99)
GLUCOSE UR STRIP-MCNC: NEGATIVE MG/DL
HCT VFR BLD AUTO: 41.8 % (ref 37–47)
HGB BLD-MCNC: 13.9 G/DL (ref 11.2–15.7)
HGB UR QL STRIP: NEGATIVE
IMM GRANULOCYTES # BLD: 0 K/UL
IMM GRANULOCYTES NFR BLD: 0.3 %
INR PPP: 1.1
KETONES UR STRIP-MCNC: NEGATIVE MG/DL
LEUKOCYTE ESTERASE UR QL STRIP: NORMAL
LYMPHOCYTES # BLD: 2.5 K/UL (ref 1.2–3.7)
LYMPHOCYTES NFR BLD: 31.5 %
MAGNESIUM SERPL-MCNC: 2.2 MG/DL (ref 1.7–2.4)
MCH RBC QN AUTO: 30.7 PG (ref 25.6–32.2)
MCHC RBC AUTO-ENTMCNC: 33.3 % (ref 32.2–35.5)
MCV RBC AUTO: 92.3 FL (ref 79.4–94.8)
MONOCYTES # BLD: 0.7 K/UL (ref 0.2–0.9)
MONOCYTES NFR BLD: 8.9 % (ref 4.7–12.5)
NEUTROPHILS # BLD: 4.4 K/UL (ref 1.6–6.1)
NEUTS SEG NFR BLD: 55.4 % (ref 34–71.1)
NITRITE UR QL STRIP: NEGATIVE
PERFORMED ON: ABNORMAL
PH UR STRIP: 6 [PH] (ref 5–9)
PLATELET # BLD AUTO: 342 K/UL (ref 182–369)
POTASSIUM SERPL-SCNC: 4.1 MEQ/L (ref 3.4–4.9)
PROT SERPL-MCNC: 6.8 G/DL (ref 6.3–8)
PROT UR STRIP-MCNC: NEGATIVE MG/DL
PROTHROMBIN TIME: 14.1 SEC (ref 12.3–14.9)
RBC # BLD AUTO: 4.53 M/UL (ref 3.93–5.22)
RBC #/AREA URNS HPF: ABNORMAL /HPF (ref 0–2)
SODIUM SERPL-SCNC: 139 MEQ/L (ref 135–144)
SP GR UR STRIP: 1.01 (ref 1–1.03)
TROPONIN, HIGH SENSITIVITY: 12 NG/L (ref 0–19)
TROPONIN, HIGH SENSITIVITY: 17 NG/L (ref 0–19)
TROPONIN, HIGH SENSITIVITY: 19 NG/L (ref 0–19)
URINE REFLEX TO CULTURE: NORMAL
UROBILINOGEN UR STRIP-ACNC: 0.2 E.U./DL
WBC # BLD AUTO: 8 K/UL (ref 4–10)
WBC #/AREA URNS HPF: ABNORMAL /HPF (ref 0–5)

## 2025-06-10 PROCEDURE — 6360000002 HC RX W HCPCS

## 2025-06-10 PROCEDURE — 70450 CT HEAD/BRAIN W/O DYE: CPT

## 2025-06-10 PROCEDURE — G0378 HOSPITAL OBSERVATION PER HR: HCPCS

## 2025-06-10 PROCEDURE — 96375 TX/PRO/DX INJ NEW DRUG ADDON: CPT

## 2025-06-10 PROCEDURE — 2500000003 HC RX 250 WO HCPCS: Performed by: NURSE PRACTITIONER

## 2025-06-10 PROCEDURE — 85730 THROMBOPLASTIN TIME PARTIAL: CPT

## 2025-06-10 PROCEDURE — 99285 EMERGENCY DEPT VISIT HI MDM: CPT

## 2025-06-10 PROCEDURE — 70496 CT ANGIOGRAPHY HEAD: CPT

## 2025-06-10 PROCEDURE — 81001 URINALYSIS AUTO W/SCOPE: CPT

## 2025-06-10 PROCEDURE — G0379 DIRECT REFER HOSPITAL OBSERV: HCPCS

## 2025-06-10 PROCEDURE — 80053 COMPREHEN METABOLIC PANEL: CPT

## 2025-06-10 PROCEDURE — 70498 CT ANGIOGRAPHY NECK: CPT

## 2025-06-10 PROCEDURE — 85610 PROTHROMBIN TIME: CPT

## 2025-06-10 PROCEDURE — 6360000004 HC RX CONTRAST MEDICATION

## 2025-06-10 PROCEDURE — 93005 ELECTROCARDIOGRAM TRACING: CPT

## 2025-06-10 PROCEDURE — 6370000000 HC RX 637 (ALT 250 FOR IP)

## 2025-06-10 PROCEDURE — 83735 ASSAY OF MAGNESIUM: CPT

## 2025-06-10 PROCEDURE — 71045 X-RAY EXAM CHEST 1 VIEW: CPT

## 2025-06-10 PROCEDURE — 85025 COMPLETE CBC W/AUTO DIFF WBC: CPT

## 2025-06-10 PROCEDURE — 96374 THER/PROPH/DIAG INJ IV PUSH: CPT

## 2025-06-10 PROCEDURE — 84484 ASSAY OF TROPONIN QUANT: CPT

## 2025-06-10 PROCEDURE — 74174 CTA ABD&PLVS W/CONTRAST: CPT

## 2025-06-10 PROCEDURE — 6370000000 HC RX 637 (ALT 250 FOR IP): Performed by: NURSE PRACTITIONER

## 2025-06-10 PROCEDURE — 36415 COLL VENOUS BLD VENIPUNCTURE: CPT

## 2025-06-10 PROCEDURE — 71275 CT ANGIOGRAPHY CHEST: CPT

## 2025-06-10 RX ORDER — SODIUM CHLORIDE 0.9 % (FLUSH) 0.9 %
5-40 SYRINGE (ML) INJECTION EVERY 12 HOURS SCHEDULED
Status: DISCONTINUED | OUTPATIENT
Start: 2025-06-10 | End: 2025-06-14 | Stop reason: HOSPADM

## 2025-06-10 RX ORDER — ONDANSETRON 2 MG/ML
4 INJECTION INTRAMUSCULAR; INTRAVENOUS ONCE
Status: COMPLETED | OUTPATIENT
Start: 2025-06-10 | End: 2025-06-10

## 2025-06-10 RX ORDER — IOPAMIDOL 755 MG/ML
75 INJECTION, SOLUTION INTRAVASCULAR
Status: COMPLETED | OUTPATIENT
Start: 2025-06-10 | End: 2025-06-10

## 2025-06-10 RX ORDER — SODIUM CHLORIDE 9 MG/ML
INJECTION, SOLUTION INTRAVENOUS PRN
Status: DISCONTINUED | OUTPATIENT
Start: 2025-06-10 | End: 2025-06-14 | Stop reason: HOSPADM

## 2025-06-10 RX ORDER — SODIUM CHLORIDE 0.9 % (FLUSH) 0.9 %
5-40 SYRINGE (ML) INJECTION PRN
Status: DISCONTINUED | OUTPATIENT
Start: 2025-06-10 | End: 2025-06-14 | Stop reason: HOSPADM

## 2025-06-10 RX ORDER — ONDANSETRON 2 MG/ML
4 INJECTION INTRAMUSCULAR; INTRAVENOUS EVERY 6 HOURS PRN
Status: DISCONTINUED | OUTPATIENT
Start: 2025-06-10 | End: 2025-06-14 | Stop reason: HOSPADM

## 2025-06-10 RX ORDER — ENOXAPARIN SODIUM 100 MG/ML
40 INJECTION SUBCUTANEOUS DAILY
Status: DISCONTINUED | OUTPATIENT
Start: 2025-06-11 | End: 2025-06-14 | Stop reason: HOSPADM

## 2025-06-10 RX ORDER — ONDANSETRON 4 MG/1
4 TABLET, ORALLY DISINTEGRATING ORAL EVERY 8 HOURS PRN
Status: DISCONTINUED | OUTPATIENT
Start: 2025-06-10 | End: 2025-06-14 | Stop reason: HOSPADM

## 2025-06-10 RX ORDER — MORPHINE SULFATE 4 MG/ML
4 INJECTION, SOLUTION INTRAMUSCULAR; INTRAVENOUS ONCE
Status: COMPLETED | OUTPATIENT
Start: 2025-06-10 | End: 2025-06-10

## 2025-06-10 RX ORDER — CLOPIDOGREL BISULFATE 75 MG/1
75 TABLET ORAL DAILY
Status: DISCONTINUED | OUTPATIENT
Start: 2025-06-11 | End: 2025-06-14 | Stop reason: HOSPADM

## 2025-06-10 RX ORDER — ATORVASTATIN CALCIUM 80 MG/1
80 TABLET, FILM COATED ORAL NIGHTLY
Status: DISCONTINUED | OUTPATIENT
Start: 2025-06-10 | End: 2025-06-14 | Stop reason: HOSPADM

## 2025-06-10 RX ORDER — POLYETHYLENE GLYCOL 3350 17 G/17G
17 POWDER, FOR SOLUTION ORAL DAILY PRN
Status: DISCONTINUED | OUTPATIENT
Start: 2025-06-10 | End: 2025-06-14 | Stop reason: HOSPADM

## 2025-06-10 RX ORDER — CLOPIDOGREL BISULFATE 75 MG/1
150 TABLET ORAL ONCE
Status: COMPLETED | OUTPATIENT
Start: 2025-06-10 | End: 2025-06-10

## 2025-06-10 RX ADMIN — LIDOCAINE HYDROCHLORIDE: 20 SOLUTION ORAL at 12:04

## 2025-06-10 RX ADMIN — CLOPIDOGREL BISULFATE 150 MG: 75 TABLET ORAL at 17:20

## 2025-06-10 RX ADMIN — ONDANSETRON 4 MG: 2 INJECTION, SOLUTION INTRAMUSCULAR; INTRAVENOUS at 11:17

## 2025-06-10 RX ADMIN — NITROGLYCERIN 0.5 INCH: 20 OINTMENT TOPICAL at 11:17

## 2025-06-10 RX ADMIN — IOPAMIDOL 75 ML: 755 INJECTION, SOLUTION INTRAVENOUS at 14:32

## 2025-06-10 RX ADMIN — MORPHINE SULFATE 4 MG: 4 INJECTION INTRAVENOUS at 11:17

## 2025-06-10 RX ADMIN — ATORVASTATIN CALCIUM 80 MG: 80 TABLET, FILM COATED ORAL at 23:09

## 2025-06-10 RX ADMIN — Medication 10 ML: at 23:09

## 2025-06-10 ASSESSMENT — ENCOUNTER SYMPTOMS
CHEST TIGHTNESS: 1
DIARRHEA: 0
COUGH: 0
BACK PAIN: 0
NAUSEA: 0
SHORTNESS OF BREATH: 0
VOMITING: 0
SORE THROAT: 0
ABDOMINAL PAIN: 0

## 2025-06-10 ASSESSMENT — PAIN SCALES - GENERAL
PAINLEVEL_OUTOF10: 0
PAINLEVEL_OUTOF10: 7

## 2025-06-10 ASSESSMENT — LIFESTYLE VARIABLES
HOW MANY STANDARD DRINKS CONTAINING ALCOHOL DO YOU HAVE ON A TYPICAL DAY: PATIENT DOES NOT DRINK
HOW OFTEN DO YOU HAVE A DRINK CONTAINING ALCOHOL: NEVER

## 2025-06-10 ASSESSMENT — PAIN - FUNCTIONAL ASSESSMENT: PAIN_FUNCTIONAL_ASSESSMENT: 0-10

## 2025-06-10 ASSESSMENT — PAIN DESCRIPTION - LOCATION: LOCATION: CHEST

## 2025-06-10 NOTE — ED TRIAGE NOTES
Patient to ED due to chest tightness and bilateral finger tingling. Patient states this started around 0730. Patient states a history of anxiety. Patient is alert and oriented x4. Resp are regular and equal.

## 2025-06-10 NOTE — ED NOTES
Patient started having slurred speech, left facial droop, drift in right and left leg. Code BAT called at this time.

## 2025-06-10 NOTE — ED PROVIDER NOTES
[facility] EMERGENCY DEPARTMENT  eMERGENCYdEPARTMENT eNCOUnter      Pt Name: Smooth Easley  MRN: [id number]  Birthdate 1975of evaluation: 6/10/2025  Provider:NAIDA Jefferson CNP    CHIEF COMPLAINT       Chief Complaint   Patient presents with    Chest Pain     Chest tightness starting at 0730         HISTORY OF PRESENT ILLNESS  (Location/Symptom, Timing/Onset, Context/Setting, Quality, Duration, Modifying Factors, Severity.)   Smooth Easley is a 49 y.o. female hx  hypertension, anxiety, cholecystectomy who presents to the emergency department for chest pain.  Patient presents to the emergency department with complaints of chest tightness, intermittent pressure to the middle of her chest that started around 730 this morning while she was at work.  She works as a  at a nursing home.  She states she was sitting at her desk and felt the pressure in her chest and it was affecting her concentration.  She also complains of tingling in her right hand she states she is right-hand dominant and this does happen occasionally with the hand tingling.  She mentions a history of anxiety and reports that her son passed away recently.  She is a cigarette user but denies any other drugs or substances.  Denies any recent cough cold congestion symptoms.  She states she does have a history of hypertension and is supposed to take clonidine but she does not check her blood pressure regularly states she typically runs \"high\" and is noncompliant with the medication.  Nothing seems to make the pain better, she states it is intermittent but right now she rates the pain as 7 out of 10 pressure to her chest.  She is tearful on exam.  Denies any shortness of breath, abdominal pain, nausea, vomiting, diarrhea, fever, chills, headache or recent illness.    HPI    Nursing Notes were reviewed and I agree.    REVIEW OF SYSTEMS    (2-9 systems for level 4, 10 or more for level 5)     Review of Systems

## 2025-06-11 ENCOUNTER — APPOINTMENT (OUTPATIENT)
Dept: MRI IMAGING | Age: 50
DRG: 065 | End: 2025-06-11
Attending: INTERNAL MEDICINE

## 2025-06-11 ENCOUNTER — APPOINTMENT (OUTPATIENT)
Dept: CT IMAGING | Age: 50
DRG: 065 | End: 2025-06-11
Attending: INTERNAL MEDICINE

## 2025-06-11 PROBLEM — R16.0 LIVER MASS, RIGHT LOBE: Status: ACTIVE | Noted: 2025-06-11

## 2025-06-11 PROBLEM — I63.9 CEREBROVASCULAR ACCIDENT (CVA) (HCC): Status: ACTIVE | Noted: 2025-06-11

## 2025-06-11 PROBLEM — I63.411 STROKE DUE TO EMBOLISM OF RIGHT MIDDLE CEREBRAL ARTERY (HCC): Status: ACTIVE | Noted: 2025-06-11

## 2025-06-11 LAB
ALBUMIN SERPL-MCNC: 3.9 G/DL (ref 3.5–4.6)
ALP SERPL-CCNC: 94 U/L (ref 40–130)
ALT SERPL-CCNC: 93 U/L (ref 0–33)
ANION GAP SERPL CALCULATED.3IONS-SCNC: 10 MEQ/L (ref 9–15)
AST SERPL-CCNC: 47 U/L (ref 0–35)
BASOPHILS # BLD: 0 K/UL (ref 0–0.2)
BASOPHILS NFR BLD: 0.2 %
BILIRUB SERPL-MCNC: 0.6 MG/DL (ref 0.2–0.7)
BUN SERPL-MCNC: 11 MG/DL (ref 6–20)
CALCIUM SERPL-MCNC: 9.2 MG/DL (ref 8.5–9.9)
CHLORIDE SERPL-SCNC: 106 MEQ/L (ref 95–107)
CHOLEST SERPL-MCNC: 186 MG/DL (ref 0–199)
CO2 SERPL-SCNC: 22 MEQ/L (ref 20–31)
CREAT SERPL-MCNC: 0.56 MG/DL (ref 0.5–0.9)
EKG ATRIAL RATE: 87 BPM
EKG ATRIAL RATE: 88 BPM
EKG ATRIAL RATE: 88 BPM
EKG DIAGNOSIS: NORMAL
EKG P AXIS: 137 DEGREES
EKG P AXIS: 65 DEGREES
EKG P AXIS: 65 DEGREES
EKG P-R INTERVAL: 118 MS
EKG P-R INTERVAL: 124 MS
EKG P-R INTERVAL: 138 MS
EKG Q-T INTERVAL: 358 MS
EKG Q-T INTERVAL: 360 MS
EKG Q-T INTERVAL: 362 MS
EKG QRS DURATION: 78 MS
EKG QRS DURATION: 80 MS
EKG QRS DURATION: 84 MS
EKG QTC CALCULATION (BAZETT): 433 MS
EKG QTC CALCULATION (BAZETT): 433 MS
EKG QTC CALCULATION (BAZETT): 438 MS
EKG R AXIS: 41 DEGREES
EKG R AXIS: 51 DEGREES
EKG R AXIS: 54 DEGREES
EKG T AXIS: 29 DEGREES
EKG T AXIS: 39 DEGREES
EKG T AXIS: 59 DEGREES
EKG VENTRICULAR RATE: 87 BPM
EKG VENTRICULAR RATE: 88 BPM
EKG VENTRICULAR RATE: 88 BPM
EOSINOPHIL # BLD: 0.1 K/UL (ref 0–0.7)
EOSINOPHIL NFR BLD: 1.3 %
ERYTHROCYTE [DISTWIDTH] IN BLOOD BY AUTOMATED COUNT: 12.9 % (ref 11.5–14.5)
ESTIMATED AVERAGE GLUCOSE: 111 MG/DL
GLOBULIN SER CALC-MCNC: 2.6 G/DL (ref 2.3–3.5)
GLUCOSE BLD-MCNC: 112 MG/DL (ref 70–99)
GLUCOSE SERPL-MCNC: 119 MG/DL (ref 70–99)
HBA1C MFR BLD: 5.5 % (ref 4–6)
HCT VFR BLD AUTO: 40.1 % (ref 37–47)
HDLC SERPL-MCNC: 35 MG/DL (ref 40–59)
HGB BLD-MCNC: 13.8 G/DL (ref 12–16)
LDLC SERPL CALC-MCNC: 128 MG/DL (ref 0–129)
LYMPHOCYTES # BLD: 1.7 K/UL (ref 1–4.8)
LYMPHOCYTES NFR BLD: 17.8 %
MAGNESIUM SERPL-MCNC: 2.1 MG/DL (ref 1.7–2.4)
MCH RBC QN AUTO: 30.9 PG (ref 27–31.3)
MCHC RBC AUTO-ENTMCNC: 34.4 % (ref 33–37)
MCV RBC AUTO: 89.7 FL (ref 79.4–94.8)
MONOCYTES # BLD: 0.7 K/UL (ref 0.2–0.8)
MONOCYTES NFR BLD: 7 %
NEUTROPHILS # BLD: 6.8 K/UL (ref 1.4–6.5)
NEUTS SEG NFR BLD: 73.4 %
PERFORMED ON: ABNORMAL
PLATELET # BLD AUTO: 336 K/UL (ref 130–400)
POTASSIUM SERPL-SCNC: 4 MEQ/L (ref 3.4–4.9)
PROT SERPL-MCNC: 6.5 G/DL (ref 6.3–8)
RBC # BLD AUTO: 4.47 M/UL (ref 4.2–5.4)
SODIUM SERPL-SCNC: 138 MEQ/L (ref 135–144)
TRIGL SERPL-MCNC: 115 MG/DL (ref 0–150)
WBC # BLD AUTO: 9.3 K/UL (ref 4.8–10.8)

## 2025-06-11 PROCEDURE — 85025 COMPLETE CBC W/AUTO DIFF WBC: CPT

## 2025-06-11 PROCEDURE — 80053 COMPREHEN METABOLIC PANEL: CPT

## 2025-06-11 PROCEDURE — 92610 EVALUATE SWALLOWING FUNCTION: CPT

## 2025-06-11 PROCEDURE — 6370000000 HC RX 637 (ALT 250 FOR IP): Performed by: INTERNAL MEDICINE

## 2025-06-11 PROCEDURE — 97162 PT EVAL MOD COMPLEX 30 MIN: CPT

## 2025-06-11 PROCEDURE — 86146 BETA-2 GLYCOPROTEIN ANTIBODY: CPT

## 2025-06-11 PROCEDURE — 85610 PROTHROMBIN TIME: CPT

## 2025-06-11 PROCEDURE — 81291 MTHFR GENE: CPT

## 2025-06-11 PROCEDURE — 1210000000 HC MED SURG R&B

## 2025-06-11 PROCEDURE — 6360000002 HC RX W HCPCS: Performed by: NURSE PRACTITIONER

## 2025-06-11 PROCEDURE — 96372 THER/PROPH/DIAG INJ SC/IM: CPT

## 2025-06-11 PROCEDURE — 36415 COLL VENOUS BLD VENIPUNCTURE: CPT

## 2025-06-11 PROCEDURE — 70450 CT HEAD/BRAIN W/O DYE: CPT

## 2025-06-11 PROCEDURE — 81241 F5 GENE: CPT

## 2025-06-11 PROCEDURE — 83735 ASSAY OF MAGNESIUM: CPT

## 2025-06-11 PROCEDURE — 2500000003 HC RX 250 WO HCPCS: Performed by: NURSE PRACTITIONER

## 2025-06-11 PROCEDURE — 97166 OT EVAL MOD COMPLEX 45 MIN: CPT

## 2025-06-11 PROCEDURE — APPSS45 APP SPLIT SHARED TIME 31-45 MINUTES: Performed by: NURSE PRACTITIONER

## 2025-06-11 PROCEDURE — 86225 DNA ANTIBODY NATIVE: CPT

## 2025-06-11 PROCEDURE — 99222 1ST HOSP IP/OBS MODERATE 55: CPT | Performed by: NURSE PRACTITIONER

## 2025-06-11 PROCEDURE — 83036 HEMOGLOBIN GLYCOSYLATED A1C: CPT

## 2025-06-11 PROCEDURE — 70551 MRI BRAIN STEM W/O DYE: CPT

## 2025-06-11 PROCEDURE — 99255 IP/OBS CONSLTJ NEW/EST HI 80: CPT | Performed by: PSYCHIATRY & NEUROLOGY

## 2025-06-11 PROCEDURE — 85302 CLOT INHIBIT PROT C ANTIGEN: CPT

## 2025-06-11 PROCEDURE — 86147 CARDIOLIPIN ANTIBODY EA IG: CPT

## 2025-06-11 PROCEDURE — 83090 ASSAY OF HOMOCYSTEINE: CPT

## 2025-06-11 PROCEDURE — 85305 CLOT INHIBIT PROT S TOTAL: CPT

## 2025-06-11 PROCEDURE — 85730 THROMBOPLASTIN TIME PARTIAL: CPT

## 2025-06-11 PROCEDURE — 80061 LIPID PANEL: CPT

## 2025-06-11 PROCEDURE — 6370000000 HC RX 637 (ALT 250 FOR IP): Performed by: NURSE PRACTITIONER

## 2025-06-11 PROCEDURE — 85613 RUSSELL VIPER VENOM DILUTED: CPT

## 2025-06-11 RX ORDER — CLONIDINE HYDROCHLORIDE 0.1 MG/1
0.1 TABLET ORAL 2 TIMES DAILY
Status: DISCONTINUED | OUTPATIENT
Start: 2025-06-11 | End: 2025-06-14 | Stop reason: HOSPADM

## 2025-06-11 RX ORDER — LABETALOL HYDROCHLORIDE 5 MG/ML
10 INJECTION, SOLUTION INTRAVENOUS EVERY 4 HOURS PRN
Status: DISCONTINUED | OUTPATIENT
Start: 2025-06-11 | End: 2025-06-14 | Stop reason: HOSPADM

## 2025-06-11 RX ORDER — ACETAMINOPHEN 325 MG/1
650 TABLET ORAL EVERY 4 HOURS PRN
Status: DISCONTINUED | OUTPATIENT
Start: 2025-06-11 | End: 2025-06-14 | Stop reason: HOSPADM

## 2025-06-11 RX ADMIN — ACETAMINOPHEN 650 MG: 325 TABLET ORAL at 16:40

## 2025-06-11 RX ADMIN — Medication 10 ML: at 21:17

## 2025-06-11 RX ADMIN — Medication 10 ML: at 10:05

## 2025-06-11 RX ADMIN — ENOXAPARIN SODIUM 40 MG: 100 INJECTION SUBCUTANEOUS at 09:51

## 2025-06-11 RX ADMIN — CLONIDINE HYDROCHLORIDE 0.1 MG: 0.1 TABLET ORAL at 21:17

## 2025-06-11 RX ADMIN — CLOPIDOGREL BISULFATE 75 MG: 75 TABLET, FILM COATED ORAL at 09:53

## 2025-06-11 RX ADMIN — ATORVASTATIN CALCIUM 80 MG: 80 TABLET, FILM COATED ORAL at 21:17

## 2025-06-11 RX ADMIN — CLONIDINE HYDROCHLORIDE 0.1 MG: 0.1 TABLET ORAL at 09:53

## 2025-06-11 ASSESSMENT — ENCOUNTER SYMPTOMS
WHEEZING: 0
SHORTNESS OF BREATH: 0
COLOR CHANGE: 0
CHEST TIGHTNESS: 0
ABDOMINAL PAIN: 0
VOMITING: 0
ALLERGIC/IMMUNOLOGIC NEGATIVE: 1
COUGH: 0
RESPIRATORY NEGATIVE: 1
GASTROINTESTINAL NEGATIVE: 1
NAUSEA: 0
RHINORRHEA: 0
BACK PAIN: 0
SORE THROAT: 0
TROUBLE SWALLOWING: 0

## 2025-06-11 ASSESSMENT — PAIN SCALES - GENERAL
PAINLEVEL_OUTOF10: 7
PAINLEVEL_OUTOF10: 0
PAINLEVEL_OUTOF10: 7

## 2025-06-11 ASSESSMENT — PAIN DESCRIPTION - ORIENTATION
ORIENTATION: RIGHT
ORIENTATION: RIGHT

## 2025-06-11 ASSESSMENT — PAIN DESCRIPTION - LOCATION
LOCATION: EAR
LOCATION: EAR

## 2025-06-11 ASSESSMENT — PAIN DESCRIPTION - DESCRIPTORS
DESCRIPTORS: THROBBING
DESCRIPTORS: THROBBING

## 2025-06-11 NOTE — PROGRESS NOTES
Internal Medicine   Hospitalist   Progress Note    2025   11:09 AM    Name:  Smooth Easley  MRN:    [id number]      Day: 0     Admit Date: 6/10/2025  7:41 PM  PCP: Diana Gutierrez PA    Code Status:  Full Code    Assessment and Plan:        Active Problems/ diagnosis:     Acute stroke-left neglect, right-sided gaze, left-sided facial droop, left-sided weakness  Chest pain-no pain this morning  Adrenal mass  Liver lesion  Uncontrolled hypertension    Plan  Patient continues to have deficits as mentioned above.  Discussed with RN.  Possible slight worsening as last night, repeat CT overnight did not show acute pathology.  Neurologist already following, contacted by ER provider at [facility]  Patient received Plavix 150 mg, resume 75 mg daily  Statin  Monitor BP and resume meds as ordered.  She has as needed labetalol for blood pressure above 220/120 as per stroke protocol.  Will follow permissive hypertension but will need further medication and likely to switch her clonidine to a different med.  Oncology to assess adrenal and liver lesions  MRI of the brain ordered  Noted CTA head and neck, CTA chest and abdomen and chest x-ray.  DVT PPx     7 pm- 7 am, please contact on call Hospitalist for any needs     Subjective:      no new events.  Poor historian.  Denies chest pain.  No fever or chills.    Physical Examination:      Vitals:  BP (!) 179/84   Pulse 98   Temp 98.2 °F (36.8 °C) (Oral)   Resp 18   Ht 1.626 m (5' 4\")   Wt 77.1 kg (170 lb)   SpO2 91%   BMI 29.18 kg/m²   Temp (24hrs), Av.3 °F (36.8 °C), Min:98 °F (36.7 °C), Max:98.6 °F (37 °C)      General appearance: alert, cooperative and no distress  Mental Status: oriented to person, place and time and normal affect  Lungs: clear to auscultation bilaterally, normal effort  Heart: regular rate and rhythm, no murmur  Abdomen: soft, nontender, nondistended, bowel sounds present, no masses  Extremities: no edema, redness, tenderness in the

## 2025-06-11 NOTE — H&P
DEPARTMENT OF HOSPITAL MEDICINE    HISTORY AND PHYSICAL EXAM    PATIENT NAME:  Smooth Easley    MRN:  [id number]  SERVICE DATE:  6/10/2025   SERVICE TIME:  9:59 PM    Primary Care Physician: Diana Gutierrez PA         SUBJECTIVE  CHIEF COMPLAINT: Chest pain       HPI: Patient being admitted for CVA and chest pain.  Patient transferred from [facility] after presenting there with midsternal chest pain that she described as tightness as well as pressure.  Patient states she was sitting at her desk at work when she began to feel the sensation.  Patient initially reported she had some tingling on her right hand.  However denies that sensation to me.  Patient also denies any lightheadedness, vision disturbance, shortness of breath, diaphoreses, gait impairment, dysarthria, difficulty swallowing.  She states that she also was told she had facial droop but she was not aware of this.  Patient denies any history of CVA.  Denies any history of blood clots.  Upon my evaluation patient has notable left pronator drift, left upper and lower extremity weakness lack of coordination, left-sided neglect, and left facial droop.  Patient is alert and oriented x 3 at this time.  Discussed my assessment with patient's assigned floor nurse, reviewed ED provider notes and discussed with supervising physician.  Dr. Heredia also evaluated patient and decided that we need to call a code bat and have a repeat CAT scan.  It is noteworthy that patient did have significantly elevated blood pressure with a systolic in the 200s initially in the ED.  At this time, patient blood pressure is stable.    Patient has a history of hypertension but states she does not take any medication on a daily basis.  Patient denies use of tobacco, alcohol, or illicit drugs.    PAST MEDICAL HISTORY:  No past medical history on file.  PAST SURGICAL HISTORY:    Past Surgical History:   Procedure Laterality Date    CHOLECYSTECTOMY       FAMILY HISTORY:  No family  Rate 88 BPM    P-R Interval 124 ms    QRS Duration 80 ms    Q-T Interval 358 ms    QTc Calculation (Bazett) 433 ms    P Axis 65 degrees    R Axis 54 degrees    T Axis 29 degrees    Diagnosis       Normal sinus rhythm  Normal ECG  When compared with ECG of 10-Nawaf-2025 12:47,  No significant change was found     Urinalysis with Reflex to Culture    Collection Time: 06/10/25  3:38 PM    Specimen: Urine   Result Value Ref Range    Color, UA Yellow Straw/Yellow    Clarity, UA Clear Clear    Glucose, Ur Negative Negative mg/dL    Bilirubin, Urine Negative Negative    Ketones, Urine Negative Negative mg/dL    Specific Gravity, UA 1.015 1.005 - 1.030    Blood, Urine Negative Negative    pH, Urine 6.0 5.0 - 9.0    Protein, UA Negative Negative mg/dL    Urobilinogen, Urine 0.2 <2.0 E.U./dL    Nitrite, Urine Negative Negative    Leukocyte Esterase, Urine Small Negative    Urine Reflex to Culture Not Indicated    Microscopic Urinalysis    Collection Time: 06/10/25  3:38 PM   Result Value Ref Range    WBC, UA 3-5 0 - 5 /HPF    RBC, UA 0-2 0 - 2 /HPF    Epithelial Cells, UA 3-5 /HPF    Bacteria, UA FEW (A) Negative /HPF       IMAGING:   CTA NECK W WO CONTRAST  Addendum Date: 6/10/2025  ADDENDUM: I discussed the findings with nurse practitioner Jayden at 3:36 p.m. on 06/10/2025.     Result Date: 6/10/2025  CTA NECK: 1. 20% stenosis of the proximal right internal carotid artery from moderate calcified and noncalcified plaque using NASCET criteria. 2. No stenosis of the proximal left internal carotid artery using NASCET criteria. 3. 50% stenosis of the origin of the codominant left vertebral artery from noncalcified plaque. 4. Widely patent codominant right vertebral artery. CTA HEAD: 1. The findings of moyamoya.  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 The A1 segment of the left SAEED is patent, with high-grade stenosis at its origin. There is reconstitution of the M2 segments of the MCAs

## 2025-06-11 NOTE — CONSULTS
Vascular Medicine and Interventional Radiology    Date of Consultation:2025    Smooth Easley  : 1975  MR #: [id number]    Consultant:NAIDA Mcelroy - SEJAL  Consulting Physician: Dr. Zaid Richmnod, Vascular Medicine and Interventional Radiology     Consult Ordered By: Natalia PASCAL-CNP            PCP:  Diana Gutierrez PA     Attending Physician: Kita Long DO     Date of Admission: 6/10/2025  7:41 PM     Chief Complaint: No chief complaint on file.     Reason for Consultation: liver biopsy    History of Present Illness: 49 year old female admitted with acute stroke, chest pain, uncontrolled HTN. CYA of the head with occlusion on the Mi segments of both MCAs and of the A1 segment of the right SAEED. CT imaging with moderate amount of atherosclerotic plaque in thoracic aorta, 5.2 cm left adrenal mass, emphysema, and 3.4 cm right hepatic lobe lesion.  Heme/oncology requesting MRI abdomen to further characterize.   IR consulted for possible liver biopsy.   Received plavix 150mg and will be on plavix 75mg daily. Additionally on lovenox for VTE prophylaxis.    Past Medical History:   has no past medical history on file.    Past SurgicalHistory:   has a past surgical history that includes Cholecystectomy.     Allergies:Aspirin    Home Medications:   Prior to Admission medications    Medication Sig Start Date End Date Taking? Authorizing Provider   cloNIDine (CATAPRES) 0.1 MG tablet Take 1 tablet by mouth 2 times daily As needed for b/p > 150/ 90 22  Yes Diana Gutierrez PA        Family History: No family history on file.   SocialHistory:    Social History     Socioeconomic History    Marital status: Single     Spouse name: Not on file    Number of children: Not on file    Years of education: Not on file    Highest education level: Not on file   Occupational History    Not on file   Tobacco Use    Smoking status: Every Day     Current packs/day: 0.50     Types: Cigarettes

## 2025-06-11 NOTE — CONSULTS
Hematology/Oncology Consult  Encounter Date: 2025 1:25 PM    Ms. Smooth Easley is a 49 y.o. female  : 1975  MRN: [id number]  Acct Number: [id number]  Requesting Provider: Kita Long DO    Reason for request: Adrenal and liver lesions      CONSULTANT: Natalia Frederick, APRN - CNP    HPI: 50 yo female with PMH of htn, anxiety who presented to the ED from work yesterday with chest pain. She is a current cigarette smoker, about a pack a day. She is supposed to be taking Clonidine for her htn but was not taking it. She was admitted with stroke symptoms with left sided weakness. Workup showed a left adrenal mass measuring 5.2 cm and a right hepatic lobe lesion measuring 3.4 cm for which we are consulted.     Patient Active Problem List   Diagnosis    Low back pain    TIA (transient ischemic attack)     History reviewed. No pertinent past medical history.  @PSH@  No family history on file.  Social History     Socioeconomic History    Marital status: Single     Spouse name: Not on file    Number of children: Not on file    Years of education: Not on file    Highest education level: Not on file   Occupational History    Not on file   Tobacco Use    Smoking status: Every Day     Current packs/day: 0.50     Types: Cigarettes    Smokeless tobacco: Never   Substance and Sexual Activity    Alcohol use: No    Drug use: Not on file    Sexual activity: Not on file   Other Topics Concern    Not on file   Social History Narrative    Not on file     Social Drivers of Health     Financial Resource Strain: Low Risk  (3/21/2023)    Overall Financial Resource Strain (CARDIA)     Difficulty of Paying Living Expenses: Not hard at all   Food Insecurity: No Food Insecurity (6/10/2025)    Hunger Vital Sign     Worried About Running Out of Food in the Last Year: Never true     Ran Out of Food in the Last Year: Never true   Transportation Needs: No Transportation Needs (6/10/2025)    PRAPARE - Transportation     Lack of  Time: 06/10/25  2:55 PM   Result Value Ref Range    Glucose 125 mg/dL    QC OK? ok    EKG 12 Lead    Collection Time: 06/10/25  3:07 PM   Result Value Ref Range    Ventricular Rate 88 BPM    Atrial Rate 88 BPM    P-R Interval 124 ms    QRS Duration 80 ms    Q-T Interval 358 ms    QTc Calculation (Bazett) 433 ms    P Axis 65 degrees    R Axis 54 degrees    T Axis 29 degrees    Diagnosis       Normal sinus rhythm  Normal ECG  When compared with ECG of 10-Nawaf-2025 12:47,  No significant change was found  Confirmed by Maksim Clay ([id number]) on 6/11/2025 3:15:10 AM     Urinalysis with Reflex to Culture    Collection Time: 06/10/25  3:38 PM    Specimen: Urine   Result Value Ref Range    Color, UA Yellow Straw/Yellow    Clarity, UA Clear Clear    Glucose, Ur Negative Negative mg/dL    Bilirubin, Urine Negative Negative    Ketones, Urine Negative Negative mg/dL    Specific Gravity, UA 1.015 1.005 - 1.030    Blood, Urine Negative Negative    pH, Urine 6.0 5.0 - 9.0    Protein, UA Negative Negative mg/dL    Urobilinogen, Urine 0.2 <2.0 E.U./dL    Nitrite, Urine Negative Negative    Leukocyte Esterase, Urine Small Negative    Urine Reflex to Culture Not Indicated    Microscopic Urinalysis    Collection Time: 06/10/25  3:38 PM   Result Value Ref Range    WBC, UA 3-5 0 - 5 /HPF    RBC, UA 0-2 0 - 2 /HPF    Epithelial Cells, UA 3-5 /HPF    Bacteria, UA FEW (A) Negative /HPF   POCT Glucose    Collection Time: 06/11/25 12:23 AM   Result Value Ref Range    POC Glucose 112 (H) 70 - 99 mg/dl    Performed on ACCU-CHEK    Lipid Panel    Collection Time: 06/11/25  5:22 AM   Result Value Ref Range    Cholesterol, Total 186 0 - 199 mg/dL    Triglycerides 115 0 - 150 mg/dL    HDL 35 (L) 40 - 59 mg/dL    LDL Cholesterol 128 0 - 129 mg/dL   Comprehensive Metabolic Panel    Collection Time: 06/11/25  5:22 AM   Result Value Ref Range    Sodium 138 135 - 144 mEq/L    Potassium 4.0 3.4 - 4.9 mEq/L    Chloride 106 95 - 107 mEq/L    CO2 22 20 - 31

## 2025-06-11 NOTE — PLAN OF CARE
See OT evaluation for all goals and OT POC. Electronically signed by Rosaura Forbes OTR/L on 6/11/2025 at 12:46 PM

## 2025-06-11 NOTE — PROGRESS NOTES
[facility]   Facility/Department: [address] TELE  Speech Language Pathology  Clinical Bedside Swallow Evaluation    NAME:Smooht Easley  : 1975 (49 y.o.)   [x]   confirmed    MRN: [id number]  [facility]  ADMISSION DATE: 6/10/2025  PATIENT DIAGNOSIS(ES): TIA (transient ischemic attack) [G45.9]  No chief complaint on file.    Patient Active Problem List    Diagnosis Date Noted    TIA (transient ischemic attack) 06/10/2025    Low back pain 2023     History reviewed. No pertinent past medical history.  Past Surgical History:   Procedure Laterality Date    CHOLECYSTECTOMY       Allergies   Allergen Reactions    Aspirin        DATE ONSET: 6/10/2025    Date of Evaluation: 2025   Evaluating Therapist: Siddhartha Edgar, LUZ MARINA    Dysphagia Diagnosis  Dysphagia Diagnosis: Moderate to severe oral stage dysphagia;Moderate pharyngeal stage dysphagia  Dysphagia Impression : Moderate-severe oral dysphagia and suspected moderate pharyngeal dysphagia.  Pt presented with impaired labial seal with anterior spillage, impaired bolus manipulation, cohesion, and propulsion.  Unable to masticate regular solid and was removed from mouth.  Suspect premature pharyngeal entry.  Hyolaryngeal elevation present.  Pt presented with immediate strong cough with thin trial, small sip from cup.  No overt s/s of aspiration occured with puree and mildy thick trials.  Decreasing level of alertness as exam progressed.    Recommended Diet  Recommendations: Dysphagia treatment;Modified barium swallow study  Diet Solids Recommendation: Pureed  Liquid Consistency Recommendation: Mildly Thick (Nectar)  Recommended Form of Meds: Meds in puree  Compensatory Swallowing Strategies : Upright as possible for all oral intake;Small bites/sips;Eat/Feed slowly;Swallow 2 times per bite/sip;Check for pocketing of food on the Left (present cup/spoon to right side of mouth)       Reason for Referral  Smooth Easley was referred for a  thick liquids with adequate oral clearance with no overt s/s of difficulty or aspiration.  Goal 2: Patient will demonstrate recommended swallow strategies for safe and efficient swallow at supervised level.  Goal 3: Patient will complete labial/buccal/lingual ROM/strengthening/coordination exercises 10x/each with min cues, to promote safety and efficiency of oral phase of swallow.  Goal 4: Patient will complete base of tongue and pharyngeal strengthening exercises 10x/each with min cues, in order to strengthen and establish a more effective swallow.  Goal 5: Additional goals to be added to POC following the completion of an instrumental swallowing procedure, if indicated per results of procedure.  Long-term Goals  Timeframe for Long-term Goals: 1-2 weeks  Goal 1: Patient will tolerate least restrictive diet with no adverse outcomes.    Safety Devices  Safety Devices  Safety Devices in place: Yes  Type of devices: Bed alarm in place;Call light within reach    Pain Assessment  Patient does not c/o pain.    Pain Re-assessment  Patient does not c/o pain.    Dysphagia Outcomes Severity Scale  Dysphagia Outcome Severity Scale: Level 3: Moderate dysphagia- Total assistance, supervision or strategies. Two or more diet consistencies restricted    Therapy Time  SLP Individual Minutes  Time In: 1415  Time Out: 1428  Minutes: 13              Signature: Electronically signed by LUZ MARINA Ham on 6/11/2025 at 3:14 PM

## 2025-06-11 NOTE — PROGRESS NOTES
Stroke Education provided to patient and the following topics were discussed    1. Patients personal risk factors for stroke are hyperlipidemia, hypertension, and smoking    2. Warning signs of Stroke:        * Sudden numbness or weakness of the face, arm or leg, especially on one side of          The body            * Sudden confusion, trouble speaking or understanding        * Sudden trouble seeing in one or both eyes        * Sudden trouble walking, dizziness, loss of balance or coordination        * Sudden severe headache with no known cause      3. Importance of activation Emergency Medical Services ( 9-1-1 ) immediately if experience any warning signs of stroke.    4. Be sure and schedule a follow-up appointment with your primary care doctor or any specialists as instructed.     5. You must take medicine every day to treat your risk factors for stroke.  Be sure to take your medicines exactly as your doctor tells you: no more, no less.  Know what your medicines are for , what they do.  Anti-thrombotics /anticoagulants can help prevent strokes.  You are taking the following medicine(s)  plavix and lipitor    6.  Smoking and second-hand smoke greatly increase your risk of stroke, cardiovascular disease and death. Smoking history cigarettes, 1 per day or started year 1986    7. Information provided was [facility] Stroke Education Binder    8. Documentation of teaching completed in Patient Education Activity and on Care Plan with teaching response noted?  yes

## 2025-06-11 NOTE — CARE COORDINATION
Case Management Assessment  Initial Evaluation    Date/Time of Evaluation: 6/11/2025 4:16 PM  Assessment Completed by: ALDO Odell    If patient is discharged prior to next notation, then this note serves as note for discharge by case management.    Patient Name: Smooth Easley                   YOB: 1975  Diagnosis: TIA (transient ischemic attack) [G45.9]                   Date / Time: 6/10/2025  7:41 PM    Patient Admission Status: Observation   Readmission Risk (Low < 19, Mod (19-27), High > 27): No data recorded  Current PCP: Diana Gutierrez PA  PCP verified by CM? Yes    Chart Reviewed: Yes      History Provided by: Patient  Patient Orientation: Alert and Oriented, Person, Place, Situation    Patient Cognition: Alert    Hospitalization in the last 30 days (Readmission):  No    If yes, Readmission Assessment in CM Navigator will be completed.    Advance Directives:      Code Status: Full Code   Patient's Primary Decision Maker is: Legal Next of Kin      Discharge Planning:    Patient lives with: Children Type of Home: Acute Rehab  Primary Care Giver: Self  Patient Support Systems include: Children, Friends/Neighbors   Current Financial resources:    Current community resources:    Current services prior to admission: None            Current DME:              Type of Home Care services:  None    ADLS  Prior functional level: Independent in ADLs/IADLs  Current functional level: Assistance with the following:, Bathing, Dressing, Cooking, Housework, Shopping, Mobility    PT AM-PAC: 12 /24  OT AM-PAC: 14 /24    Family can provide assistance at DC: Yes  Would you like Case Management to discuss the discharge plan with any other family members/significant others, and if so, who? No  Plans to Return to Present Housing: Unknown at present  Other Identified Issues/Barriers to RETURNING to current housing: MEICALLY CLEARED  Potential Assistance needed at discharge: N/A            Potential

## 2025-06-11 NOTE — PROGRESS NOTES
[facility] OCCUPATIONAL THERAPY EVALUATION - ACUTE     NAME: Smooth Easley  : 1975 (49 y.o.)  MRN: [id number]  CODE STATUS: Full Code  Room: [facility]/87-    Date of Service: 2025    Patient Diagnosis(es): TIA (transient ischemic attack) [G45.9]   Patient Active Problem List    Diagnosis Date Noted    TIA (transient ischemic attack) 06/10/2025    Low back pain 2023      No data found    History reviewed. No pertinent past medical history.  Past Surgical History:   Procedure Laterality Date    CHOLECYSTECTOMY          Restrictions  Restrictions/Precautions: Fall Risk (Per clinical judgement)  Activity Level: Up as Tolerated   Up as Tolerated    Safety Devices: Safety Devices  Type of Devices: All fall risk precautions in place;Call light within reach;Left in chair;Chair alarm in place;Nurse notified     Patient's date of birth confirmed: Yes    General:  Chart Reviewed: Yes  Patient assessed for rehabilitation services?: Yes    Subjective  Subjective: \"I think I need therapy\"       Pain at start of treatment: No    Pain at end of treatment: No    Location:   Description:   Nursing notified: Not Applicable  RN:   Intervention: Repositioned    Prior Level of Function:  Social/Functional History  Lives With: Daughter  Type of Home: House  Home Layout: Multi-level, Laundry in basement, Bed/Bath upstairs (4 steps between levels)  Home Access: Stairs to enter with rails  Entrance Stairs - Number of Steps: 5 from the outside or 4 inside steps from side door  Bathroom Shower/Tub: Tub/Shower unit  Bathroom Equipment: None  Home Equipment: None  Has the patient had two or more falls in the past year or any fall with injury in the past year?: No  Prior Level of Assist for ADLs: Independent  Prior Level of Assist for Homemaking: Independent  Prior Level of Assist for Ambulation: Independent household ambulator, with or without device, Independent community ambulator, with or without device  Prior Level of  Assist for Transfers: Independent  Active : Yes  Occupation: Full time employment  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 L sided facial droop, gaze fixed to R, head turn to R, heavy L lean in sitting and standing    Cognition Status:  Cognition  Overall Cognitive Status: Exceptions  Arousal/Alertness: Appears intact  Following Commands: Follows one step commands with increased time  Attention Span: Appears intact  Memory: Decreased short term memory;Decreased long term memory  Safety Judgement: Decreased awareness of need for assistance;Decreased awareness of need for safety  Problem Solving: Assistance required to identify errors made;Assistance required to generate solutions;Assistance required to implement solutions;Assistance required to correct errors made  Insights: Decreased awareness of deficits  Initiation: Requires cues for some  Sequencing: Requires cues for some    Perception Status:  Perception  Overall Perceptual Status: WFL    Vision and Hearing Status:  Vision  Vision: Impaired  Vision Exceptions: Wears glasses for reading   Vision - Basic Assessment  Prior Vision: Wears glasses only for reading  Visual History: No significant visual history  Patient Visual Report: No visual complaint reported.  Visual Field Cut: Left  Oculo Motor Control: Impaired  Impairments: Alignment - Double Vision;Tracking;Scanning  Vision Comments: Gaze biased to the R   Hearing  Hearing: Within functional limits    GROSS ASSESSMENT AROM/PROM:  AROM: Within functional limits (Poor coordination of LUE during ROM)       ROM:   LUE AROM (degrees)  LUE AROM : WFL  LUE General

## 2025-06-11 NOTE — PROGRESS NOTES
tolerated treatment well    Patient Education  Education Given To: Patient;Caregiver  Education Provided: Role of Therapy;Plan of Care  Education Provided Comments: Educated pt and pt's dtr on engagement on Pt's L side  Education Method: Verbal  Barriers to Learning: Vision  Education Outcome: Verbalized understanding;Continued education needed       ASSESSMENT:   Body Structures, Functions, Activity Limitations Requiring Skilled Therapeutic Intervention: Decreased functional mobility ;Decreased ADL status;Decreased ROM;Decreased tolerance to work activity;Decreased strength;Decreased safe awareness;Decreased cognition;Decreased endurance;Decreased balance;Decreased coordination;Decreased vision/visual deficit  Decision Making: Medium Complexity  History: High  Exam: Med  Clinical Presentation: High    Therapy Prognosis: Good  Barriers to Learning: insight/neglect         DISCHARGE RECOMMENDATIONS:  Discharge Recommendations: Continue to assess pending progress, Therapy recommended at discharge    Assessment: Pt presents with L sided weakness, L neglect, and impaired proprioception which has negatively impacted her functional mobility and increased her risk of falls. Continued PT indicated to progress mobility and faciltiate DC at highest level of indep and safety. Rec therapy stay at intensive level prior to DC home in order to optimize functional outcomes.  Requires PT Follow-Up: Yes       PLAN OF CARE:  Physical Therapy Plan  General Plan: 1 time a day 3-6 times a week  Current Treatment Recommendations: Strengthening, ROM, Balance training, Functional mobility training, Transfer training, Neuromuscular re-education, Gait training, Stair training, Home exercise program, Safety education & training, Patient/Caregiver education & training, Equipment evaluation, education, & procurement, Positioning, Manual, Endurance training    Safety Devices  Type of Devices: All fall risk precautions in place    Goals:  Long  Term Goals  Long Term Goal 1: Pt to complete bed mobility with SBA  Long Term Goal 2: Pt to complete transfers with SBA  Long Term Goal 3: Pt to ambulate 10-25ft with LRD and Marylou  Long Term Goal 4: Pt to maintain midline sitting EOB unsupported X 3min with SBA    AMPAC (6 CLICK) BASIC MOBILITY  AM-PAC Inpatient Mobility Raw Score : 12     Therapy Time:   Individual   Time In 1112   Time Out 1137   Minutes 25           Amelie Link, PT, 06/11/25 at 1:49 PM         Definitions for assistance levels  Independent = pt does not require any physical supervision or assistance from another person for activity completion. Device may be needed.  Stand by assistance = pt requires verbal cues or instructions from another person, close to but not touching, to perform the activity  Minimal assistance= pt performs 75% or more of the activity; assistance is required to complete the activity  Moderate assistance= pt performs 50% of the activity; assistance is required to complete the activity  Maximal assistance = pt performs 25% of the activity; assistance is required to complete the activity  Dependent = pt requires total physical assistance to accomplish the task

## 2025-06-11 NOTE — PROGRESS NOTES
Pt arrived to room 287 via ambulance in stable condition. Pt ambulated from cot to bed. Upon arrival neuro assessment done & NIH found to be 1 for facial droop. Pt does have some weakness on her left side but no drift noted. GCS 15 but patient is withdrawn with a flat affect. Denies any chest pain at this time. Vitals taken and stable. Pt oriented to room and admission done. Providers including Dr. Heredia made aware of patients arrival to the floor. Call light within reach. Pt denies needs at this time.

## 2025-06-11 NOTE — CONSULTS
file   Intimate Partner Violence: Not on file   Housing Stability: Low Risk  (6/10/2025)    Housing Stability Vital Sign     Unable to Pay for Housing in the Last Year: No     Number of Times Moved in the Last Year: 0     Homeless in the Last Year: No            Medications:  Reviewed    Infusion Medications:    sodium chloride       Scheduled Medications:    cloNIDine  0.1 mg Oral BID    sodium chloride flush  5-40 mL IntraVENous 2 times per day    enoxaparin  40 mg SubCUTAneous Daily    atorvastatin  80 mg Oral Nightly    clopidogrel  75 mg Oral Daily     PRN Meds: sodium chloride flush, sodium chloride, ondansetron **OR** ondansetron, polyethylene glycol    Labs:   Recent Labs     06/10/25  1112 06/11/25  0523   WBC 8.0 9.3   HGB 13.9 13.8   HCT 41.8 40.1    336     Recent Labs     06/10/25  1112 06/11/25  0522    138   K 4.1 4.0    106   CO2 23 22   BUN 18 11   CREATININE 0.70 0.56   CALCIUM 9.3 9.2     Recent Labs     06/10/25  1112 06/11/25  0522   AST 23 47*   ALT 24 93*   BILITOT 0.4 0.6   ALKPHOS 75 94     Recent Labs     06/10/25  1358   INR 1.1     No results for input(s): \"CKTOTAL\", \"TROPONINI\" in the last 72 hours.    Urinalysis:   Lab Results   Component Value Date/Time    NITRU Negative 06/10/2025 03:38 PM    WBCUA 3-5 06/10/2025 03:38 PM    BACTERIA FEW 06/10/2025 03:38 PM    RBCUA 0-2 06/10/2025 03:38 PM    BLOODU Negative 06/10/2025 03:38 PM    GLUCOSEU Negative 06/10/2025 03:38 PM       Radiology:   Most recent    EEG No valid procedures specified.    MRI of Brain No results found for this or any previous visit.  No results found for this or any previous visit.                            MRA of the Head and Neck: No results found for this or any previous visit.  No results found for this or any previous visit.  No results found for this or any previous visit.                            CT of the Head: Results for orders placed during the hospital encounter of 06/10/25    CT HEAD  history of tobacco use, hypertension who presented to [facility] emergency room on 6/10/2025 for chest pain with tingling in the right hand that began at 7:30 in the morning.  H&P notes that on exam patient had left pronator drift, left upper and lower extremity weakness and lack of coordination, left-sided neglect and left facial droop.  Vital signs in the emergency room 217/87, 87, 18, 98.3 °F, 97%.  EKG showed normal sinus rhythm.  CT of the head negative for acute intracranial findings.    CTA of the head and neck shows occlusion of the M1 segments of both MCAs and of the A1 segment of the right SAEED.    Lab testing largely unremarkable.    Patient had worsening of NIH to 11 while in the ED.  Stat repeat CT of the head was obtained and negative.    MRI of the brain is pending.  Will assess hypercoagulable workup  Obtain echocardiogram with bubble study.  Hemoglobin A1c 5.5  HDL low at 35,   Continue Plavix 75 mg daily and high intensity statin.  Will need ongoing attention to blood pressure control  Further recommendations pending MRI results.    I have personally performed a face to face diagnostic evaluation on this patient, reviewed all data and investigations, and am the sole provider of all clinical decisions on the neurological status of this patient.  Events noted Case discussed with emergency room.  Initially patient had chest pain and then some numbness and tingling fingers in both her upper extremity and there developed some weakness of the left upper and lower extremity with the facial drooping.  By the time she came back from the CT scan room it was reported to me that she had almost completely cleared.  She was then transferred here after many hours and admitted to the floor at around 11:30 PM when she was examined by the nurse with a NIH of 1, after this at some point in time patient has worsened and she is now has left-sided weakness with a gaze preference to the right.  We were only

## 2025-06-12 ENCOUNTER — APPOINTMENT (OUTPATIENT)
Dept: MRI IMAGING | Age: 50
DRG: 065 | End: 2025-06-12
Attending: INTERNAL MEDICINE

## 2025-06-12 LAB
ALBUMIN SERPL-MCNC: 4.1 G/DL (ref 3.5–4.6)
ALP SERPL-CCNC: 88 U/L (ref 40–130)
ALT SERPL-CCNC: 64 U/L (ref 0–33)
ANION GAP SERPL CALCULATED.3IONS-SCNC: 13 MEQ/L (ref 9–15)
AST SERPL-CCNC: 26 U/L (ref 0–35)
BASOPHILS # BLD: 0 K/UL (ref 0–0.2)
BASOPHILS NFR BLD: 0.5 %
BILIRUB SERPL-MCNC: 0.6 MG/DL (ref 0.2–0.7)
BUN SERPL-MCNC: 14 MG/DL (ref 6–20)
CALCIUM SERPL-MCNC: 9 MG/DL (ref 8.5–9.9)
CHLORIDE SERPL-SCNC: 106 MEQ/L (ref 95–107)
CO2 SERPL-SCNC: 22 MEQ/L (ref 20–31)
CREAT SERPL-MCNC: 0.63 MG/DL (ref 0.5–0.9)
EOSINOPHIL # BLD: 0.2 K/UL (ref 0–0.7)
EOSINOPHIL NFR BLD: 2.3 %
ERYTHROCYTE [DISTWIDTH] IN BLOOD BY AUTOMATED COUNT: 13 % (ref 11.5–14.5)
GLOBULIN SER CALC-MCNC: 2.5 G/DL (ref 2.3–3.5)
GLUCOSE SERPL-MCNC: 111 MG/DL (ref 70–99)
HCT VFR BLD AUTO: 43.7 % (ref 37–47)
HGB BLD-MCNC: 14.7 G/DL (ref 12–16)
HOMOCYSTEINE: 8.4 UMOL/L (ref 0–15)
LYMPHOCYTES # BLD: 2.3 K/UL (ref 1–4.8)
LYMPHOCYTES NFR BLD: 29.3 %
MAGNESIUM SERPL-MCNC: 2 MG/DL (ref 1.7–2.4)
MCH RBC QN AUTO: 30.8 PG (ref 27–31.3)
MCHC RBC AUTO-ENTMCNC: 33.6 % (ref 33–37)
MCV RBC AUTO: 91.4 FL (ref 79.4–94.8)
MONOCYTES # BLD: 0.7 K/UL (ref 0.2–0.8)
MONOCYTES NFR BLD: 9.2 %
NEUTROPHILS # BLD: 4.5 K/UL (ref 1.4–6.5)
NEUTS SEG NFR BLD: 58.4 %
PLATELET # BLD AUTO: 374 K/UL (ref 130–400)
POTASSIUM SERPL-SCNC: 4.2 MEQ/L (ref 3.4–4.9)
PROT SERPL-MCNC: 6.6 G/DL (ref 6.3–8)
RBC # BLD AUTO: 4.78 M/UL (ref 4.2–5.4)
SODIUM SERPL-SCNC: 141 MEQ/L (ref 135–144)
WBC # BLD AUTO: 7.7 K/UL (ref 4.8–10.8)

## 2025-06-12 PROCEDURE — A9577 INJ MULTIHANCE: HCPCS | Performed by: STUDENT IN AN ORGANIZED HEALTH CARE EDUCATION/TRAINING PROGRAM

## 2025-06-12 PROCEDURE — 6370000000 HC RX 637 (ALT 250 FOR IP): Performed by: NURSE PRACTITIONER

## 2025-06-12 PROCEDURE — 1210000000 HC MED SURG R&B

## 2025-06-12 PROCEDURE — 6360000004 HC RX CONTRAST MEDICATION: Performed by: STUDENT IN AN ORGANIZED HEALTH CARE EDUCATION/TRAINING PROGRAM

## 2025-06-12 PROCEDURE — APPSS15 APP SPLIT SHARED TIME 0-15 MINUTES: Performed by: NURSE PRACTITIONER

## 2025-06-12 PROCEDURE — 80053 COMPREHEN METABOLIC PANEL: CPT

## 2025-06-12 PROCEDURE — 83735 ASSAY OF MAGNESIUM: CPT

## 2025-06-12 PROCEDURE — 6370000000 HC RX 637 (ALT 250 FOR IP): Performed by: INTERNAL MEDICINE

## 2025-06-12 PROCEDURE — 6360000002 HC RX W HCPCS: Performed by: NURSE PRACTITIONER

## 2025-06-12 PROCEDURE — 2500000003 HC RX 250 WO HCPCS: Performed by: NURSE PRACTITIONER

## 2025-06-12 PROCEDURE — 74183 MRI ABD W/O CNTR FLWD CNTR: CPT

## 2025-06-12 PROCEDURE — 36415 COLL VENOUS BLD VENIPUNCTURE: CPT

## 2025-06-12 PROCEDURE — 85025 COMPLETE CBC W/AUTO DIFF WBC: CPT

## 2025-06-12 PROCEDURE — 99232 SBSQ HOSP IP/OBS MODERATE 35: CPT | Performed by: PSYCHIATRY & NEUROLOGY

## 2025-06-12 PROCEDURE — 97535 SELF CARE MNGMENT TRAINING: CPT

## 2025-06-12 RX ADMIN — Medication 10 ML: at 10:41

## 2025-06-12 RX ADMIN — ATORVASTATIN CALCIUM 80 MG: 80 TABLET, FILM COATED ORAL at 20:27

## 2025-06-12 RX ADMIN — CLOPIDOGREL BISULFATE 75 MG: 75 TABLET, FILM COATED ORAL at 13:48

## 2025-06-12 RX ADMIN — CLONIDINE HYDROCHLORIDE 0.1 MG: 0.1 TABLET ORAL at 13:48

## 2025-06-12 RX ADMIN — ENOXAPARIN SODIUM 40 MG: 100 INJECTION SUBCUTANEOUS at 10:41

## 2025-06-12 RX ADMIN — GADOBENATE DIMEGLUMINE 20 ML: 529 INJECTION, SOLUTION INTRAVENOUS at 12:51

## 2025-06-12 RX ADMIN — CLONIDINE HYDROCHLORIDE 0.1 MG: 0.1 TABLET ORAL at 20:27

## 2025-06-12 ASSESSMENT — ENCOUNTER SYMPTOMS
COUGH: 0
VOMITING: 0
TROUBLE SWALLOWING: 1
COLOR CHANGE: 0
WHEEZING: 0
SHORTNESS OF BREATH: 0
CHEST TIGHTNESS: 0
NAUSEA: 0

## 2025-06-12 NOTE — PLAN OF CARE
Problem: Discharge Planning  Goal: Discharge to home or other facility with appropriate resources  Outcome: Progressing     Problem: Safety - Adult  Goal: Free from fall injury  Outcome: Progressing     Problem: Occupational Therapy - Adult  Goal: By Discharge: Performs self-care activities at highest level of function for planned discharge setting.  See evaluation for individualized goals.  6/11/2025 1245 by Rosaura Forbes, OTR/L  Outcome: Progressing     Problem: Physical Therapy - Adult  Goal: By Discharge: Performs mobility at highest level of function for planned discharge setting.  See evaluation for individualized goals.  6/11/2025 1349 by Amelie Link, PT  Outcome: Progressing     Problem: SLP Adult - Impaired Swallowing  Goal: By Discharge: Advance to least restrictive diet without signs or symptoms of aspiration for planned discharge setting.  See evaluation for individualized goals.  6/11/2025 1515 by Siddhartha Edgar, SLP  Outcome: Progressing     Problem: Pain  Goal: Verbalizes/displays adequate comfort level or baseline comfort level  Outcome: Progressing     Problem: Chronic Conditions and Co-morbidities  Goal: Patient's chronic conditions and co-morbidity symptoms are monitored and maintained or improved  Outcome: Progressing     Problem: Neurosensory - Adult  Goal: Achieves stable or improved neurological status  Outcome: Progressing  Goal: Achieves maximal functionality and self care  Outcome: Progressing     Problem: Musculoskeletal - Adult  Goal: Return mobility to safest level of function  Outcome: Progressing  Goal: Maintain proper alignment of affected body part  Outcome: Progressing  Goal: Return ADL status to a safe level of function  Outcome: Progressing

## 2025-06-12 NOTE — CARE COORDINATION
Unlabored  Chest Assessment: Chest expansion symmetrical, Trachea midline      Cognition and Behavior:  Language Preference (if other than English):      Alertness/Behavior  Neuro (WDL): Exceptions to WDL  Level of Consciousness: Alert (0)  History of Falling: No      Short Term Memory Deficits     History of Falling: No    Safety          Prior Level of Function and Living Arrangements:  Social/Functional History  Lives With: Daughter  Type of Home: House  Home Layout: Multi-level, Laundry in basement, Bed/Bath upstairs (4 steps between levels)  Home Access: Stairs to enter with rails  Entrance Stairs - Number of Steps: 5 from the outside or 4 inside steps from side door  Bathroom Shower/Tub: Tub/Shower unit  Bathroom Equipment: None  Home Equipment: None  Has the patient had two or more falls in the past year or any fall with injury in the past year?: No  Prior Level of Assist for ADLs: Independent  Prior Level of Assist for Homemaking: Independent  Prior Level of Assist for Ambulation: Independent household ambulator, with or without device, Independent community ambulator, with or without device  Prior Level of Assist for Transfers: Independent  Active : Yes  Education: high school  Occupation: Full time employment  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  Living Arrangements: Children  Support Systems: Children, Friends/Neighbors  Type of Home Care Services: None  Dental Appliances: Partials, At bedside  Vision - Corrective Lenses: Eyeglasses, At bedside  Hearing Aid: None  Personal Equipment:   Dental Appliances: Partials, At bedside  Vision - Corrective Lenses: Eyeglasses, At bedside  Hearing Aid: None      CURRENT FUNCTIONAL LEVEL:  Physical Therapy  Bed mobility:  Bed mobility  Rolling to Left: Partial/Moderate assistance (06/13/25 0948)  Rolling to Right: Partial/Moderate assistance (06/11/25 1342)  Supine to Sit: Partial/Moderate assistance (06/13/25 0984)  Sit to  ethnic, cultural, Caodaism practices or restrictions we should know about during your stay in the hospital?  : No  Are you able to do the things that help you spiritually even though you are sick? : No  How often do you feel lonely or isolated from those around you?: Never  Do you need support with your Caodaism or spiritual needs?: No  It is part of the job for the Spiritual Health Team to stop by for a visit, would you like to specifically request a visit from our ?: No   Funding needs:   Potential Assistance Purchasing Medications: Yes     Expected Level of Improvement with Rehab  Assist for ADL Min A- Mod A  Assist for Transfers Supervision / Standby Assist  Assist for Gait Contact Guard / Minimal Assistance    Patient's willingness to participate: Yes  Patient's ability to tolerate proposed care: Yes  Patient/Family Goals of Rehab (in patient's/family's own words): To get stronger    Anticipated Discharge Plan:  Home with   Home Health, RN PT OT Aide to be determined      Barriers to Discharge:  Home entry accessibility  Multi-level home  Bathroom accessibility  Bedroom accessibility  Equipment needs  Caregiver availability  Resource availability      Rehab evaluation plan: Recommend Acute Inpatient Rehab  Rehabilitation Impairment Group Code: 01.1  Rehab Impairment Group: Neurologic: Cerebral Infarct  Estimated Length of Stay (days): 23  Rehab Diagnosis: Impaired mobility and ADL's due to R MCA infarct  Reviewer's Signature: Electronically signed by Starla Briseno on 6/13/2025 at 4:07 PM      I have reviewed and concur with the above Preadmission Screening.   Rehab Admitting Doctor: Dr. Eliz Love MD

## 2025-06-12 NOTE — PLAN OF CARE
Problem: Discharge Planning  Goal: Discharge to home or other facility with appropriate resources  Outcome: Progressing     Problem: Safety - Adult  Goal: Free from fall injury  Outcome: Progressing     Problem: Pain  Goal: Verbalizes/displays adequate comfort level or baseline comfort level  Outcome: Progressing     Problem: Chronic Conditions and Co-morbidities  Goal: Patient's chronic conditions and co-morbidity symptoms are monitored and maintained or improved  Outcome: Progressing     Problem: Neurosensory - Adult  Goal: Achieves stable or improved neurological status  Outcome: Progressing  Goal: Achieves maximal functionality and self care  Outcome: Progressing     Problem: Musculoskeletal - Adult  Goal: Return mobility to safest level of function  Outcome: Progressing  Goal: Maintain proper alignment of affected body part  Outcome: Progressing  Goal: Return ADL status to a safe level of function  Outcome: Progressing

## 2025-06-12 NOTE — PROGRESS NOTES
[facility] Neurology Daily Progress Note  Name: Smooth Easley  Age: 49 y.o.  Gender: female  CodeStatus: Full Code  Allergies: Aspirin    Chief Complaint:No chief complaint on file.    Primary Care Provider: Diana Gutierrez PA  InpatientTreatment Team: Treatment Team:   Alan Alston MD Patel, Dhruv R, MD Litam, Patrick P, MD Wehbe, Charles R, MD Archer, Dinora, APRN - Nicole Nino RN Strohsack, Jena, RN Diaz Raices, Adalberto Hipp, Lisa, RN Gastelum, Ava  Admission Date: 6/10/2025      HPI   Pt seen and examined for neuro follow up.  Patient is alert and oriented x 3, no acute distress, cooperative.  Right gaze deviation improved today.  Left leg weakness at 3 out of 5.  Left arm weakness 0 out of 5.  Denies headache.  Dysphagia with puréed diet and thickened liquids.  Dysarthric.    Patient seen and examined right gaze deviation with some improvement of flaccid upper extremity with lower EXTR strength of 3 or 5 unchanged    Vitals:    06/12/25 1351   BP: (!) 141/118   Pulse: (!) 101   Resp: 17   Temp: 97.9 °F (36.6 °C)   SpO2: 92%        Review of Systems   Constitutional:  Negative for fatigue and fever.   HENT:  Positive for trouble swallowing. Negative for hearing loss.    Eyes:  Negative for visual disturbance.   Respiratory:  Negative for cough, chest tightness, shortness of breath and wheezing.    Cardiovascular:  Negative for chest pain, palpitations and leg swelling.   Gastrointestinal:  Negative for nausea and vomiting.   Genitourinary:  Negative for difficulty urinating.   Musculoskeletal:  Positive for gait problem.   Skin:  Negative for color change and rash.   Neurological:  Positive for facial asymmetry, speech difficulty and weakness. Negative for dizziness, tremors, seizures, syncope, light-headedness, numbness and headaches.   Psychiatric/Behavioral:  Negative for agitation, confusion and hallucinations. The patient is not nervous/anxious.          Physical Exam  Vitals and  abnormality.    SOFT TISSUES/SKULL:  No acute abnormality of the visualized skull or soft  tissues.    Impression  Question early loss of gray-white differentiation through the lateral right  frontal lobe/right MCA territory. No evidence for hemorrhage. Consider CTA of  the brain.  No results found for this or any previous visit.  No results found for this or any previous visit.      Carotid duplex: No results found for this or any previous visit.  No results found for this or any previous visit.  No results found for this or any previous visit.      Echo No results found for this or any previous visit.            Assessment/Plan:    6/11/2025:  Patient is a 49-year-old female with past medical history of tobacco use, hypertension who presented to [facility] emergency room on 6/10/2025 for chest pain with tingling in the right hand that began at 7:30 in the morning.  H&P notes that on exam patient had left pronator drift, left upper and lower extremity weakness and lack of coordination, left-sided neglect and left facial droop.  Vital signs in the emergency room 217/87, 87, 18, 98.3 °F, 97%.  EKG showed normal sinus rhythm.  CT of the head negative for acute intracranial findings.    CTA of the head and neck shows occlusion of the M1 segments of both MCAs and of the A1 segment of the right SAEED.    Lab testing largely unremarkable.     Patient had worsening of NIH to 11 while in the ED.  Stat repeat CT of the head was obtained and negative.     MRI of the brain is pending.  Will assess hypercoagulable workup  Obtain echocardiogram with bubble study.  Hemoglobin A1c 5.5  HDL low at 35,   Continue Plavix 75 mg daily and high intensity statin.  Will need ongoing attention to blood pressure control  Further recommendations pending MRI results.     I have personally performed a face to face diagnostic evaluation on this patient, reviewed all data and investigations, and am the sole provider of all clinical

## 2025-06-12 NOTE — PROGRESS NOTES
assess pending progress, Therapy recommended at discharge         Goals  Long Term Goals  Long Term Goal 1: Pt to complete bed mobility with SBA  Long Term Goal 2: Pt to complete transfers with SBA  Long Term Goal 3: Pt to ambulate 10-25ft with LRD and Marylou  Long Term Goal 4: Pt to maintain midline sitting EOB unsupported X 3min with SBA    PLAN    General Plan: 1 time a day 3-6 times a week  Safety Devices  Type of Devices: All fall risk precautions in place, Call light within reach, Bed alarm in place, Left in bed, Nurse notified     [facility] (6 CLICK) BASIC MOBILITY  AM-PAC Inpatient Mobility Raw Score : 12      Therapy Time   Individual   Time In 1029   Time Out 1041   Minutes 12      BM/trsf: 8  Therex: 4        Keyur Barahona PTA, 06/12/25 at 11:43 AM         Definitions for assistance levels  Independent = pt does not require any physical supervision or assistance from another person for activity completion. Device may be needed.  Stand by assistance = pt requires verbal cues or instructions from another person, close to but not touching, to perform the activity  Minimal assistance= pt performs 75% or more of the activity; assistance is required to complete the activity  Moderate assistance= pt performs 50% of the activity; assistance is required to complete the activity  Maximal assistance = pt performs 25% of the activity; assistance is required to complete the activity  Dependent = pt requires total physical assistance to accomplish the task

## 2025-06-12 NOTE — PROGRESS NOTES
[facility]   Facility/Department: [facility] ORTHO TELE  Speech Language Pathology    Smooth Easley  1975  [id number]    Date: 6/12/2025      Speech Therapy attempted to see Smooth Easley on this date for a/an:    Speech Language Evaluation    Pt was unable to be seen due to:   Patient is currently off unit.  Rec: order for MBS.  Per [facility] CHO, pt is NPO this date for procedure.  To re-attempt SLE as able, and possible MBS 6/13/25.        Electronically signed by Siddhartha Edgar, SLP on 6/12/25 at 12:46 PM EDT

## 2025-06-12 NOTE — CARE COORDINATION
Inpatient Rehab referral received. Met with patient to discuss therapy needs and [facility] rehab. Patient reports to being independent prior to this admission including working. From home with dtr and patient reports having a second dtr as well. Reviewed and explained [facility] Inpatient Rehab program and requirements, including 3 hours of intense therapy daily, anticipated length of stay, weekly team meetings and goal of discharge to home. Patient denied having any questions and would like [facility] Rehab once medically cleared for discharge pending PM&R acceptance. Referral discussed with AR unit manager. Unit manager aware patient currently without insurance and at this time, per notes, only qualifies for financial aid. HELP will follow while on rehab in the event of any income changes. Unit manager also aware of patient with new CVA in addition to identified masses that require further evaluation. AR unit manager accepts patient to rehab unit pending PM&R physician acceptance and medical clearance.   Electronically signed by Starla Briseno on 6/12/2025 at 3:32 PM

## 2025-06-12 NOTE — PROGRESS NOTES
Internal Medicine   Hospitalist   Progress Note    2025   1:26 PM    Name:  Smooth Easley  MRN:    [id number]     IP Day: 1     Admit Date: 6/10/2025  7:41 PM  PCP: Diana Gutierrez PA    Code Status:  Full Code    Assessment and Plan:        Active Problems/ diagnosis:     Acute right MCA stroke with left neglect, right-sided gaze, left-sided facial droop, left-sided weakness  - on Plavix, Lipitor  - continue PT/OT/Speech therapy  - monitor on telemetry  - followed by neurology    Chest pain  - ECG showed SR with no acute ischemic changes  - serial troponins were negative  - TTE pending     Left adrenal mass / liver lesion  - MRI pending  - followed by oncology    HTN   - on Clonidine   - IV labetalol as needed    Tobacco use  Noncompliance       Disposition - acute rehab      7 pm- 7 am, please contact on call Hospitalist for any needs     Subjective:     no new events, is afebrile, stable HD, on RA.    Physical Examination:      Vitals:  BP (!) 152/105   Pulse (!) 103   Temp 98.1 °F (36.7 °C) (Oral)   Resp 17   Ht 1.626 m (5' 4\")   Wt 77.1 kg (170 lb)   SpO2 98%   BMI 29.18 kg/m²   Temp (24hrs), Av.3 °F (36.8 °C), Min:98.1 °F (36.7 °C), Max:98.8 °F (37.1 °C)      General appearance: awake, cooperative  Lungs: clear to auscultation bilaterally  Heart: S1/S2, RRR  Abdomen: soft  Extremities: no edema    Data:     Labs:  Recent Labs     25  0523 25  0517   WBC 9.3 7.7   HGB 13.8 14.7    374     Recent Labs     25  0522 25  0516    141   K 4.0 4.2    106   CO2 22 22   BUN 11 14   CREATININE 0.56 0.63   GLUCOSE 119* 111*     Recent Labs     25  0522 25  0516   AST 47* 26   ALT 93* 64*   BILITOT 0.6 0.6   ALKPHOS 94 88       Current Facility-Administered Medications   Medication Dose Route Frequency Provider Last Rate Last Admin    cloNIDine (CATAPRES) tablet 0.1 mg  0.1 mg Oral BID Kita Long DO   0.1 mg at 25    labetalol  were answered. Counseling, diet and education were provided. Case will be discussed with nursing staff when appropriate. Family will be updated if and when appropriate.       Electronically signed by YEYO SALES MD on 6/12/2025 at 1:26 PM

## 2025-06-12 NOTE — PROGRESS NOTES
the mA/kV was utilized to reduce the radiation dose to as low as reasonably achievable.; CTA of the chest was performed before and after the administration of intravenous contrast.  Multiplanar reformatted images are provided for review.  MIP images are provided for review. Automated exposure control, iterative reconstruction, and/or weight based adjustment of the mA/kV was utilized to reduce the radiation dose to as low as reasonably achievable. COMPARISON: None used HISTORY: ORDERING SYSTEM PROVIDED HISTORY: r/o dissection TECHNOLOGIST PROVIDED HISTORY: Reason for exam:->r/o dissection Additional Contrast?->1 What reading provider will be dictating this exam?->CRC; ORDERING SYSTEM PROVIDED HISTORY: Rule out dissection TECHNOLOGIST PROVIDED HISTORY: Reason for exam:->Rule out dissection Additional Contrast?->1 What reading provider will be dictating this exam?->CRC FINDINGS: CTA CHEST: Thoracic aorta: Aortic valve and aortic annular calcifications.  Moderate atherosclerotic disease in the thoracic aorta and arch vessels.  There was poor contrast opacification of the thoracic aorta.  No clear evidence of dissection.  No evidence of aneurysm. Mediastinum: No concerning mediastinal or hilar lymph nodes.  There is a normal appearance of the pulmonary arterial system as imaged.  Heart chambers are not enlarged.  No pericardial effusion.  No evidence of right heart strain.  Mild coronary artery disease.  Normal course and appearance of the esophagus. Lungs/Pleura: Airway is patent.  No endobronchial lesions.  There are some centrilobular emphysematous changes in the lungs.  Lung base atelectatic changes present.  There may be some early peripheral fibrotic changes.  No pleural effusion or pneumothorax. Soft Tissues/Bones: The thyroid gland and remaining soft tissue structures of the neck appear unremarkable.  No concerning axillary lymph nodes.  The anterior and posterior chest wall is unremarkable.  Vertebral body  without the administration of intravenous contrast. Automated exposure control, iterative reconstruction, and/or weight based adjustment of the mA/kV was utilized to reduce the radiation dose to as low as reasonably achievable. COMPARISON: None. HISTORY: ORDERING SYSTEM PROVIDED HISTORY: code BAT TECHNOLOGIST PROVIDED HISTORY: Reason for exam:->code BAT Has a \"code stroke\" or \"stroke alert\" been called?->Yes Decision Support Exception - unselect if not a suspected or confirmed emergency medical condition->Emergency Medical Condition (MA) What reading provider will be dictating this exam?->CRC FINDINGS: BRAIN/VENTRICLES: There is no acute intracranial hemorrhage, mass effect or midline shift.  No abnormal extra-axial fluid collection.  The gray-white differentiation is maintained without evidence of an acute infarct.  There is no evidence of hydrocephalus. There are areas of periventricular white matter hypodensity, compatible with chronic small vessel ischemic change. ORBITS: The visualized portion of the orbits demonstrate no acute abnormality. SINUSES: The visualized paranasal sinuses and mastoid air cells demonstrate no acute abnormality. SOFT TISSUES/SKULL:  No acute abnormality of the visualized skull or soft tissues.     No acute intracranial abnormality.     XR CHEST PORTABLE  Result Date: 6/10/2025  EXAMINATION: ONE XRAY VIEW OF THE CHEST 6/10/2025 12:02 pm COMPARISON: None. HISTORY: ORDERING SYSTEM PROVIDED HISTORY: chest pain TECHNOLOGIST PROVIDED HISTORY: Reason for exam:->chest pain Is the patient pregnant?->No What reading provider will be dictating this exam?->CRC FINDINGS: The lungs are without acute focal process.  There is no effusion or pneumothorax. The cardiomediastinal silhouette is without acute process. The osseous structures are without acute process.     No acute process.     Collaborating physician: Dr. Copeland    ASSESSMENT AND PLAN:  Patient with new heterogeneous left adrenal mass with

## 2025-06-12 NOTE — CARE COORDINATION
LSW SPOKE WITH ALEJANDRA FROM [facility] AND SHE EXPLAINED THAT THE PT IS EMPLOYE FULL TIME SO THERE IS NO MEDICAID ELIGIBILITY AT THIS TIME.  PT IS ELIGIBLE FOR FINANCIAL ZUNILDA ONLY pT IS NOT ELIGIBLE FOR MEDICATION ASSISTANCE AT THIS TIME.  HELP WILL CONTINUE TO FOLLOW IN  INCOME CHANGES AND PT CAN BE ELIGIBLE.  PLAN REMAINS [facility] ONCE MEDICALLY STABLE.

## 2025-06-13 ENCOUNTER — APPOINTMENT (OUTPATIENT)
Age: 50
DRG: 065 | End: 2025-06-13
Attending: INTERNAL MEDICINE

## 2025-06-13 ENCOUNTER — APPOINTMENT (OUTPATIENT)
Dept: GENERAL RADIOLOGY | Age: 50
DRG: 065 | End: 2025-06-13
Attending: INTERNAL MEDICINE

## 2025-06-13 LAB
ALBUMIN SERPL-MCNC: 4.1 G/DL (ref 3.5–4.6)
ALP SERPL-CCNC: 81 U/L (ref 40–130)
ALT SERPL-CCNC: 47 U/L (ref 0–33)
ANION GAP SERPL CALCULATED.3IONS-SCNC: 12 MEQ/L (ref 9–15)
AST SERPL-CCNC: 20 U/L (ref 0–35)
B2 GLYCOPROT1 IGA SER-ACNC: <10 SAU
B2 GLYCOPROT1 IGG SERPL IA-ACNC: <10 SGU
B2 GLYCOPROT1 IGM SERPL IA-ACNC: <10 SMU
BASOPHILS # BLD: 0 K/UL (ref 0–0.2)
BASOPHILS NFR BLD: 0.4 %
BILIRUB SERPL-MCNC: 0.5 MG/DL (ref 0.2–0.7)
BUN SERPL-MCNC: 15 MG/DL (ref 6–20)
CALCIUM SERPL-MCNC: 9.3 MG/DL (ref 8.5–9.9)
CARDIOLIPIN IGG SER IA-ACNC: <10 GPL
CARDIOLIPIN IGM SER IA-ACNC: <10 MPL
CHLORIDE SERPL-SCNC: 103 MEQ/L (ref 95–107)
CO2 SERPL-SCNC: 23 MEQ/L (ref 20–31)
CREAT SERPL-MCNC: 0.54 MG/DL (ref 0.5–0.9)
DSDNA AB TITR SER CLIF: 2 IU (ref 0–24)
ECHO AO ROOT DIAM: 3 CM
ECHO AO ROOT INDEX: 1.64 CM/M2
ECHO AV AREA PEAK VELOCITY: 1.5 CM2
ECHO AV AREA VTI: 1.8 CM2
ECHO AV AREA/BSA PEAK VELOCITY: 0.8 CM2/M2
ECHO AV AREA/BSA VTI: 1 CM2/M2
ECHO AV CUSP MM: 1.7 CM
ECHO AV MEAN GRADIENT: 4 MMHG
ECHO AV MEAN VELOCITY: 0.9 M/S
ECHO AV PEAK GRADIENT: 9 MMHG
ECHO AV PEAK VELOCITY: 1.5 M/S
ECHO AV VELOCITY RATIO: 0.53
ECHO AV VTI: 24 CM
ECHO BSA: 1.87 M2
ECHO EST RA PRESSURE: 3 MMHG
ECHO LA DIAMETER INDEX: 1.31 CM/M2
ECHO LA DIAMETER: 2.4 CM
ECHO LA TO AORTIC ROOT RATIO: 0.8
ECHO LA VOL A-L A2C: 59 ML (ref 22–52)
ECHO LA VOL A-L A4C: 43 ML (ref 22–52)
ECHO LA VOL MOD A2C: 57 ML (ref 22–52)
ECHO LA VOL MOD A4C: 42 ML (ref 22–52)
ECHO LA VOLUME AREA LENGTH: 52 ML
ECHO LA VOLUME INDEX A-L A2C: 32 ML/M2 (ref 16–34)
ECHO LA VOLUME INDEX A-L A4C: 23 ML/M2 (ref 16–34)
ECHO LA VOLUME INDEX AREA LENGTH: 28 ML/M2 (ref 16–34)
ECHO LA VOLUME INDEX MOD A2C: 31 ML/M2 (ref 16–34)
ECHO LA VOLUME INDEX MOD A4C: 23 ML/M2 (ref 16–34)
ECHO LV E' LATERAL VELOCITY: 4.87 CM/S
ECHO LV E' SEPTAL VELOCITY: 6.67 CM/S
ECHO LV EDV A2C: 85 ML
ECHO LV EDV A4C: 115 ML
ECHO LV EDV BP: 99 ML (ref 56–104)
ECHO LV EDV INDEX A4C: 63 ML/M2
ECHO LV EDV INDEX BP: 54 ML/M2
ECHO LV EDV NDEX A2C: 46 ML/M2
ECHO LV EF PHYSICIAN: 55 %
ECHO LV EJECTION FRACTION A2C: 61 %
ECHO LV EJECTION FRACTION A4C: 47 %
ECHO LV EJECTION FRACTION BIPLANE: 52 % (ref 55–100)
ECHO LV ESV A2C: 33 ML
ECHO LV ESV A4C: 60 ML
ECHO LV ESV BP: 47 ML (ref 19–49)
ECHO LV ESV INDEX A2C: 18 ML/M2
ECHO LV ESV INDEX A4C: 33 ML/M2
ECHO LV ESV INDEX BP: 26 ML/M2
ECHO LV FRACTIONAL SHORTENING: 18 % (ref 28–44)
ECHO LV INTERNAL DIMENSION DIASTOLE INDEX: 2.68 CM/M2
ECHO LV INTERNAL DIMENSION DIASTOLIC: 4.9 CM (ref 3.9–5.3)
ECHO LV INTERNAL DIMENSION SYSTOLIC INDEX: 2.19 CM/M2
ECHO LV INTERNAL DIMENSION SYSTOLIC: 4 CM
ECHO LV IVSD: 1.2 CM (ref 0.6–0.9)
ECHO LV IVSS: 1.4 CM
ECHO LV MASS 2D: 213.3 G (ref 67–162)
ECHO LV MASS INDEX 2D: 116.5 G/M2 (ref 43–95)
ECHO LV POSTERIOR WALL DIASTOLIC: 1.1 CM (ref 0.6–0.9)
ECHO LV POSTERIOR WALL SYSTOLIC: 1.6 CM
ECHO LV RELATIVE WALL THICKNESS RATIO: 0.45
ECHO LVOT AREA: 2.8 CM2
ECHO LVOT AV VTI INDEX: 0.66
ECHO LVOT DIAM: 1.9 CM
ECHO LVOT MEAN GRADIENT: 1 MMHG
ECHO LVOT PEAK GRADIENT: 3 MMHG
ECHO LVOT PEAK VELOCITY: 0.8 M/S
ECHO LVOT STROKE VOLUME INDEX: 24.5 ML/M2
ECHO LVOT SV: 44.8 ML
ECHO LVOT VTI: 15.8 CM
ECHO MV A VELOCITY: 1.07 M/S
ECHO MV E VELOCITY: 0.37 M/S
ECHO MV E/A RATIO: 0.35
ECHO MV E/E' LATERAL: 7.6
ECHO MV E/E' RATIO (AVERAGED): 6.57
ECHO MV E/E' SEPTAL: 5.55
ECHO PV MAX VELOCITY: 1.4 M/S
ECHO PV PEAK GRADIENT: 8 MMHG
ECHO RV INTERNAL DIMENSION: 2.4 CM
ECHO RV TAPSE: 2.1 CM (ref 1.7–?)
EOSINOPHIL # BLD: 0.2 K/UL (ref 0–0.7)
EOSINOPHIL NFR BLD: 2.3 %
ERYTHROCYTE [DISTWIDTH] IN BLOOD BY AUTOMATED COUNT: 12.9 % (ref 11.5–14.5)
GLOBULIN SER CALC-MCNC: 2.6 G/DL (ref 2.3–3.5)
GLUCOSE SERPL-MCNC: 113 MG/DL (ref 70–99)
HCT VFR BLD AUTO: 43.9 % (ref 37–47)
HGB BLD-MCNC: 15 G/DL (ref 12–16)
LYMPHOCYTES # BLD: 2.5 K/UL (ref 1–4.8)
LYMPHOCYTES NFR BLD: 26 %
MAGNESIUM SERPL-MCNC: 2.1 MG/DL (ref 1.7–2.4)
MCH RBC QN AUTO: 30.9 PG (ref 27–31.3)
MCHC RBC AUTO-ENTMCNC: 34.2 % (ref 33–37)
MCV RBC AUTO: 90.3 FL (ref 79.4–94.8)
MONOCYTES # BLD: 0.9 K/UL (ref 0.2–0.8)
MONOCYTES NFR BLD: 8.8 %
NEUTROPHILS # BLD: 6 K/UL (ref 1.4–6.5)
NEUTS SEG NFR BLD: 62 %
PLATELET # BLD AUTO: 412 K/UL (ref 130–400)
POTASSIUM SERPL-SCNC: 3.8 MEQ/L (ref 3.4–4.9)
PROT C AG ACT/NOR PPP IA: 85 % (ref 63–153)
PROT S AG ACT/NOR PPP IA: 141 % (ref 63–126)
PROT SERPL-MCNC: 6.7 G/DL (ref 6.3–8)
RBC # BLD AUTO: 4.86 M/UL (ref 4.2–5.4)
SODIUM SERPL-SCNC: 138 MEQ/L (ref 135–144)
WBC # BLD AUTO: 9.7 K/UL (ref 4.8–10.8)

## 2025-06-13 PROCEDURE — 1210000000 HC MED SURG R&B

## 2025-06-13 PROCEDURE — 80053 COMPREHEN METABOLIC PANEL: CPT

## 2025-06-13 PROCEDURE — 93306 TTE W/DOPPLER COMPLETE: CPT | Performed by: INTERNAL MEDICINE

## 2025-06-13 PROCEDURE — 92523 SPEECH SOUND LANG COMPREHEN: CPT

## 2025-06-13 PROCEDURE — 36415 COLL VENOUS BLD VENIPUNCTURE: CPT

## 2025-06-13 PROCEDURE — 92526 ORAL FUNCTION THERAPY: CPT

## 2025-06-13 PROCEDURE — 99232 SBSQ HOSP IP/OBS MODERATE 35: CPT | Performed by: PSYCHIATRY & NEUROLOGY

## 2025-06-13 PROCEDURE — 97535 SELF CARE MNGMENT TRAINING: CPT

## 2025-06-13 PROCEDURE — 6360000002 HC RX W HCPCS: Performed by: NURSE PRACTITIONER

## 2025-06-13 PROCEDURE — 85025 COMPLETE CBC W/AUTO DIFF WBC: CPT

## 2025-06-13 PROCEDURE — 2500000003 HC RX 250 WO HCPCS

## 2025-06-13 PROCEDURE — APPSS15 APP SPLIT SHARED TIME 0-15 MINUTES: Performed by: NURSE PRACTITIONER

## 2025-06-13 PROCEDURE — C8929 TTE W OR WO FOL WCON,DOPPLER: HCPCS

## 2025-06-13 PROCEDURE — 83735 ASSAY OF MAGNESIUM: CPT

## 2025-06-13 PROCEDURE — 6370000000 HC RX 637 (ALT 250 FOR IP): Performed by: INTERNAL MEDICINE

## 2025-06-13 PROCEDURE — 92611 MOTION FLUOROSCOPY/SWALLOW: CPT

## 2025-06-13 PROCEDURE — 6360000004 HC RX CONTRAST MEDICATION: Performed by: NURSE PRACTITIONER

## 2025-06-13 PROCEDURE — 2500000003 HC RX 250 WO HCPCS: Performed by: NURSE PRACTITIONER

## 2025-06-13 PROCEDURE — 6370000000 HC RX 637 (ALT 250 FOR IP): Performed by: NURSE PRACTITIONER

## 2025-06-13 PROCEDURE — 74230 X-RAY XM SWLNG FUNCJ C+: CPT

## 2025-06-13 RX ADMIN — BARIUM SULFATE 50 ML: 0.81 POWDER, FOR SUSPENSION ORAL at 14:27

## 2025-06-13 RX ADMIN — CLOPIDOGREL BISULFATE 75 MG: 75 TABLET, FILM COATED ORAL at 11:58

## 2025-06-13 RX ADMIN — ATORVASTATIN CALCIUM 80 MG: 80 TABLET, FILM COATED ORAL at 22:33

## 2025-06-13 RX ADMIN — BARIUM SULFATE 80 ML: 400 SUSPENSION ORAL at 14:27

## 2025-06-13 RX ADMIN — SULFUR HEXAFLUORIDE 2 ML: KIT at 11:58

## 2025-06-13 RX ADMIN — CLONIDINE HYDROCHLORIDE 0.1 MG: 0.1 TABLET ORAL at 11:58

## 2025-06-13 RX ADMIN — CLONIDINE HYDROCHLORIDE 0.1 MG: 0.1 TABLET ORAL at 22:33

## 2025-06-13 RX ADMIN — ENOXAPARIN SODIUM 40 MG: 100 INJECTION SUBCUTANEOUS at 11:58

## 2025-06-13 RX ADMIN — Medication 10 ML: at 22:34

## 2025-06-13 ASSESSMENT — ENCOUNTER SYMPTOMS
SHORTNESS OF BREATH: 0
TROUBLE SWALLOWING: 1
COUGH: 0
CHEST TIGHTNESS: 0
COLOR CHANGE: 0
WHEEZING: 0
NAUSEA: 0
VOMITING: 0

## 2025-06-13 NOTE — PROGRESS NOTES
Physical Therapy Med Surg Daily Treatment Note  Facility/Department: [address] ORTHO TELE  Room: [facility]/W287-01       NAME: Smooth Easley  : 1975 (49 y.o.)  MRN: [id number]  CODE STATUS: Full Code    Date of Service: 2025    Patient Diagnosis(es): TIA (transient ischemic attack) [G45.9]  Stroke due to embolism of right middle cerebral artery (HCC) [I63.411]   No chief complaint on file.    Patient Active Problem List    Diagnosis Date Noted    Cerebrovascular accident (CVA) (HCC) 2025    Liver mass, right lobe 2025    TIA (transient ischemic attack) 06/10/2025    Low back pain 2023        History reviewed. No pertinent past medical history.  Past Surgical History:   Procedure Laterality Date    CHOLECYSTECTOMY         Chart Reviewed: Yes  Family/Caregiver Present: No    Restrictions:  Restrictions/Precautions: Fall Risk (Per clinical judgement)    SUBJECTIVE:   Subjective: \"This is frustrating.\"    Pain  Pain  Pre-Pain: 0  Post-Pain: 0     OBJECTIVE:        Bed mobility  Rolling to Left: Partial/Moderate assistance  Supine to Sit: Partial/Moderate assistance  Sit to Supine:  (DNT pt into bedside chair to promote OOB activity.)  Bed Mobility Comments: step by step verbal and tactile cues for sequencing, bed flat, increased lifting assistance needed to complete supine>sit.    Transfers  Sit to Stand: Partial/Moderate assistance;2 Person Assistance  Stand to Sit: Partial/Moderate assistance;2 Person Assistance  Bed to Chair: Substantial/Maximal assistance;2 Person Assistance  Comment: Significant L lean with inability to self correct. Cues for weight shift to R side. STS training completed in tremaine steady as standing frame focus on correcting to midline and engaging L quad. STS x6 total with standing weight shifting while in tremaine steady.                  Activity Tolerance  Activity Tolerance: Patient tolerated treatment well          ASSESSMENT   Assessment: focus of tx on finding and  maintaining midline sitting EOB and standing in standing frame, decreased engagment of L quad while standing. pt with strong motivation and participation this session.     Discharge Recommendations:  Continue to assess pending progress, Therapy recommended at discharge         Goals  Long Term Goals  Long Term Goal 1: Pt to complete bed mobility with SBA  Long Term Goal 2: Pt to complete transfers with SBA  Long Term Goal 3: Pt to ambulate 10-25ft with LRD and Marylou  Long Term Goal 4: Pt to maintain midline sitting EOB unsupported X 3min with SBA    PLAN    General Plan: 1 time a day 3-6 times a week  Safety Devices  Type of Devices: Call light within reach, Chair alarm in place, Left in chair     AMPAC (6 CLICK) BASIC MOBILITY  AM-PAC Inpatient Mobility Raw Score : 12      Therapy Time   Individual   Time In 0847   Time Out 0910   Minutes 23      BM/Trsf: 23        Keyur Barahona PTA, 06/13/25 at 9:52 AM         Definitions for assistance levels  Independent = pt does not require any physical supervision or assistance from another person for activity completion. Device may be needed.  Stand by assistance = pt requires verbal cues or instructions from another person, close to but not touching, to perform the activity  Minimal assistance= pt performs 75% or more of the activity; assistance is required to complete the activity  Moderate assistance= pt performs 50% of the activity; assistance is required to complete the activity  Maximal assistance = pt performs 25% of the activity; assistance is required to complete the activity  Dependent = pt requires total physical assistance to accomplish the task

## 2025-06-13 NOTE — PROGRESS NOTES
[facility]   Facility/Department: [address] TELE  Speech Language Pathology  Initial Speech/Language/Cognitive Assessment    NAME:Smooth Easley  : 1975 (49 y.o.)   [x]   confirmed    MRN: [id number]  ROOM: [place] [id number]-  ADMISSION DATE: 6/10/2025  PATIENT DIAGNOSIS(ES): TIA (transient ischemic attack) [G45.9]  Stroke due to embolism of right middle cerebral artery (HCC) [I63.411]  No chief complaint on file.    Patient Active Problem List    Diagnosis Date Noted    Cerebrovascular accident (CVA) (HCC) 2025    Liver mass, right lobe 2025    TIA (transient ischemic attack) 06/10/2025    Low back pain 2023     History reviewed. No pertinent past medical history.  Past Surgical History:   Procedure Laterality Date    CHOLECYSTECTOMY         Date of Onset: 6/10/2025    Date of Evaluation: 2025   Evaluating Therapist: Siddhartha Edgar, SLP        Diagnosis: Mild-moderate dysarthria characterized by decreased breath support, imprecise speech, and occasional decreased intelligibility in sentences.  Mild cognitive linguistic impairment characterized by flat affect, left visual neglect, decreased attention to details when following 3 step directions, verbal sequencing, and verbal reasoning.    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

## 2025-06-13 NOTE — PROGRESS NOTES
[facility]  Occupational Therapy        NAME:  Smooth Easley  ROOM: W287/W287-01  :  1975  DATE: 2025    Attempted to see Smooth Easley at 1128 on this date for:   []  Initial Evaluation   [x]  Treatment       Patient was unable to be seen due to:   [] Off unit for testing/procedure    [x] Patient declined stating \"no\"   [] Therapy on hold due to     [] Nursing deferred due to    [] Other:      Pt in bed resting upon arrival. Pt appears lethargic and reports that she had a bedside ultrasound this AM. Introduced self and offered to assist pt with ADL completion. Pt declined at this time reporting fatigue and that she would like to rest. Pt denies any needs.    Will attempt again as able.     Electronically signed by HANDY Lira on 25 at 11:34 AM EDT

## 2025-06-13 NOTE — PROGRESS NOTES
then some numbness and tingling fingers in both her upper extremity and there developed some weakness of the left upper and lower extremity with the facial drooping.  By the time she came back from the CT scan room it was reported to me that she had almost completely cleared.  She was then transferred here after many hours and admitted to the floor at around 11:30 PM when she was examined by the nurse with a NIH of 1, after this at some point in time patient has worsened and she is now has left-sided weakness with a gaze preference to the right.  We were only notified in the morning. this was very much many hours after the onset of her symptoms and she did not have any large vessel disease except she has chronic M1 M2 stenosis.  Diagnosis of moyamoya was entertained though she does not have any carotid stenosis.  In any case at that point in time she was not a candidate for anticoagulation or TNK.  MRI reviewed and patient as a stroke which is moderate size in the right frontoparietal lobe.  Likely to be embolic.  Will start her on Plavix as she is allergic to aspirin actually we did give her Plavix yesterday 150 mg.  We will reevaluate her middle cerebral artery status.  Patient has multiple risk factors uncontrolled for strokes as well as smoking.  60% time spent on evaluating patient       6/12/2025:  Large acute ischemic right MCA infarct without mass effect or midline shift.  CTA of the head and neck shows occlusion of the M1 segments of both MCAs and of the A1 segment of the right SAEED.    Echo with bubble study pending  Patient continues on Plavix.  Aspirin allergy noted.  Continue high intensity statin    Hemoglobin A1c 5.5  Elevated blood pressure, improving  Rehab pre-CERT pending    I have personally performed a face to face diagnostic evaluation on this patient, reviewed all data and investigations, and am the sole provider of all clinical decisions on the neurological status of this patient.   Fairly large acute ischemic stroke right frontoparietal.  Patient has monoplegia of the left arm with lower extremity weakness of 3 of 5.  Gaze preference much improved.  She is not aphasic but mildly dysarthric.  Continue aspirin Plavix.  60% time spent on evaluating patient    6/13/2025:  Large acute ischemic right MCA infarct without mass effect or midline shift.  CTA of the head and neck shows occlusion of the M1 segments of both MCAs and of the A1 segment of the right SAEED.    Echo with bubble study completed with report pending  Patient continues on Plavix.  Aspirin allergy noted.  Continue high intensity statin    Hemoglobin A1c 5.5  Elevated blood pressure, improving  Rehab pre-CERT pending.  Okay to DC to rehab from neurology standpoint.    I have personally performed a face to face diagnostic evaluation on this patient, reviewed all data and investigations, and am the sole provider of all clinical decisions on the neurological status of this patient.  Large acute MCA stroke with no mass effect.  CT angiogram showed occlusion of M1 and MCAs.  Patient did not have adrenal mass make a biopsy okay to had from neurological standpoint does not require anticoagulation for now.  60% time spent evaluating  pt    Dangelo Armijo MD, BRAXTON  Diplomate, American Board of Psychiatry & Neurology  Board Certified in Vascular Neurology  Board Certified in Neuromuscular Medicine  Certified in Neurorehabilitation       Collaborating physicians: Dr Armijo    Electronically signed by NAIDA Gardner CNP on 6/13/2025 at 2:40 PM

## 2025-06-13 NOTE — PROCEDURES
[facility]   Facility/Department: [address] TELE  Speech Language Pathology  Modified Barium Swallow (MBS)    NAME:Smooth Easley  : 1975 (49 y.o.)   [x]   confirmed    MRN: [id number]  ROOM: [place] [id number]-  ADMISSION DATE: 6/10/2025  PATIENT DIAGNOSIS(ES): TIA (transient ischemic attack) [G45.9]  Stroke due to embolism of right middle cerebral artery (HCC) [I63.411]  No chief complaint on file.    Patient Active Problem List    Diagnosis Date Noted    Cerebrovascular accident (CVA) (HCC) 2025    Liver mass, right lobe 2025    TIA (transient ischemic attack) 06/10/2025    Low back pain 2023     History reviewed. No pertinent past medical history.  Past Surgical History:   Procedure Laterality Date    CHOLECYSTECTOMY       Allergies   Allergen Reactions    Aspirin        Date of Onset:  6/10/2025      Date of Evaluation: 2025   Evaluating Therapist: Siddhartha Edgar, SLP      DYSPHAGIA DIAGNOSIS:  Severe oral dysphagia, Moderate pharyngeal dysphagia    DIET RECOMMENDATIONS:  Solid consistency: Pureed  Liquid consistency: Mildly Thick  Liquid administration via: Cup  Medication administration: Whole in puree or Crushed in puree  Supervision: Set up assist and Frequent checks by nursing    Compensatory Swallowing Strategies:   Upright positioning  Check for pocketing of food on the left  Small bites/sips  Double swallow  Chin Tuck with liquids  Eat/Feed Slowly       Reason for Referral  Smooth Easley was referred for a modified barium swallow evaluation to assess the efficiency of her swallow function, assess for aspiration, and make recommendations regarding safe dietary consistencies, effective compensatory strategies, and safe eating environment.      GENERAL  Previous swallow testing:  BSE 25 rec puree and mildly thick liquids and MBS    Cognitive status:   Oriented   Alert  Cognitive Deficits    Communication observation:   Dysarthria     Follows Directions:  control exercises to improve bolus control/cohesion and manipulation with adequate effort and performance with min cues, in order to strengthen the muscles of the swallow and to safely handle the least restrictive diet level.  5. Patient will complete base of tongue exercises that promote anterior to posterior propulsion of bolus and improve tongue base retraction 10x/each with min cues, in order to strengthen the muscles of the swallow and to safely handle the least restrictive diet level.  6.  Patient will tolerate thermal gustatory stimulation to anterior faucial pillars to stimulate the swallow and improve swallow onset time, with prompt swallow initiation following stimulation on 4/5 trials.  7. Pt will complete falsetto & effortful pitch glide exercises 10x/each with min cues in order to strengthen the muscles of the swallow to increase airway protection.        Prognosis for improvements is: Good, Potential      Evaluation results and recommendations were discussed with the:   Patient., who gives verbal understanding of recommendations.  .  Reinforcement of recommendations is needed.        Pain Assessment:  Patient does not c/o pain.    Pain Re-assessment:  Patient does not c/o pain.    Safety Devices:  All fall risk precautions in place      Radiology Tech present  Radiologist available for consultation, as needed      Thank you for your referral.  Please direct any questions or concerns to Speech Pathology.    Images are available through PACS. Please see radiologist report for additional details.      Therapy Time  SLP Individual Minutes  Time In: 1412  Time Out: 1428  Minutes: 16              Signature:  Electronically signed by LUZ MARINA Ham on 6/13/2025 at 3:50 PM

## 2025-06-13 NOTE — PROGRESS NOTES
[facility] Hospitalist Progress Note    Admitting Date and Time: 6/10/2025  7:41 PM  Admit Dx: TIA (transient ischemic attack) [G45.9]  Stroke due to embolism of right middle cerebral artery (HCC) [I63.411]    Subjective:  Patient is being followed for TIA (transient ischemic attack) [G45.9]  Stroke due to embolism of right middle cerebral artery (HCC) [I63.411]   Pt was seen and examined today. Patient had no complain on examination. Per report patient is having difficulty swallowing. She denies fever, chest pain, nausea, vomiting, leg pain or worsening leg swelling   Per RN: No acute event overnight     ROS: denies fever, chills, cp, sob, n/v, HA unless stated above.      cloNIDine  0.1 mg Oral BID    sodium chloride flush  5-40 mL IntraVENous 2 times per day    enoxaparin  40 mg SubCUTAneous Daily    atorvastatin  80 mg Oral Nightly    clopidogrel  75 mg Oral Daily     labetalol, 10 mg, Q4H PRN  acetaminophen, 650 mg, Q4H PRN  sodium chloride flush, 5-40 mL, PRN  sodium chloride, , PRN  ondansetron, 4 mg, Q8H PRN   Or  ondansetron, 4 mg, Q6H PRN  polyethylene glycol, 17 g, Daily PRN         Objective:    BP (!) 123/93   Pulse (!) 108   Temp 98.1 °F (36.7 °C)   Resp 18   Ht 1.626 m (5' 4\")   Wt 77.1 kg (170 lb)   SpO2 94%   BMI 29.18 kg/m²     General Appearance: alert and oriented to person, place and time and in no acute distress  Skin: warm and dry  Head: normocephalic and atraumatic  Eyes: pupils equal, round, and reactive to light, extraocular eye movements intact, conjunctivae normal  Neck: neck supple and non tender without mass   Pulmonary/Chest: clear to auscultation bilaterally- no wheezes, rales or rhonchi, normal air movement, no respiratory distress  Cardiovascular: normal rate, normal S1 and S2 and no carotid bruits  Abdomen: soft, non-tender, non-distended, normal bowel sounds, no masses or organomegaly  Extremities: no cyanosis, no clubbing and no edema  Neurologic: dysarthria and  facial asymmetry         Recent Labs     06/11/25  0522 06/12/25  0516 06/13/25  0526    141 138   K 4.0 4.2 3.8    106 103   CO2 22 22 23   BUN 11 14 15   CREATININE 0.56 0.63 0.54   GLUCOSE 119* 111* 113*   CALCIUM 9.2 9.0 9.3       Recent Labs     06/11/25  0523 06/12/25  0517 06/13/25  0526   WBC 9.3 7.7 9.7   RBC 4.47 4.78 4.86   HGB 13.8 14.7 15.0   HCT 40.1 43.7 43.9   MCV 89.7 91.4 90.3   MCH 30.9 30.8 30.9   MCHC 34.4 33.6 34.2   RDW 12.9 13.0 12.9    374 412*         Assessment:    Acute right MCA stroke with left neglect, right-sided gaze, left-sided facial droop, left-sided weakness  - on Plavix, Lipitor  - continue PT/OT/Speech therapy  - monitor on telemetry  - followed by neurology     Left adrenal mass / liver lesion  - MRI pending  - followed by oncology     HTN   - on Clonidine   - IV labetalol as needed     Tobacco use  Noncompliance         Disposition - acute rehab       NOTE: This report was transcribed using voice recognition software. Every effort was made to ensure accuracy; however, inadvertent computerized transcription errors may be present.  Electronically signed by Moe Medrano MD on 6/13/2025 at 4:57 PM

## 2025-06-13 NOTE — CONSULTS
Physical Medicine & Rehabilitation  Consult Note      Admitting Physician: Alan Alston MD    Primary Care Provider: Diana Gutierrez PA     Reason for Consult:  Asses rehab needs, promote physical and mental function, analyze level of care to determine rehab needs, improve ability to actively participate in the rehabilitation process, and decrease likelihood of re-admit to the hospital after discharge.      History of Present Illness:    Smooth Easley is a 49 y.o. female admitted to [facility] on 6/10/2025.               HPI      I reviewed recent nursing notes discussed care with acute care providers, \" see notes \".   Events from the previous 24 hours reviewed and discussed .      Their inpatient work up has included:    Imaging:  Imaging and other studies reviewed and discussed with patient and staff    MRI BRAIN WO CONTRAST  Result Date: 6/11/2025  EXAMINATION: MRI OF THE BRAIN WITHOUT CONTRAST  6/11/2025 12:38 pm TECHNIQUE: Multiplanar multisequence MRI of the brain was performed without the administration of intravenous contrast. COMPARISON: None. HISTORY: ORDERING SYSTEM PROVIDED HISTORY: stroke TECHNOLOGIST PROVIDED HISTORY: Reason for exam:->stroke What is the sedation requirement?->None What reading provider will be dictating this exam?->CRC FINDINGS: INTRACRANIAL STRUCTURES/VENTRICLES: There is a large area of restricted diffusion in the anterior right parietal lobe with matching increased T2 signal.  There is no hemorrhage or mass effect.  This is a large non-hemorrhagic acute anterior right MCA infarct.  There is loss of grey-white differentiation on the previous CT head in retrospect. No mass effect or midline shift. No evidence of an acute intracranial hemorrhage. Total microhemorrhages: None. Superficial siderosis: None. The ventricles and sulci are normal in size and configuration. There is moderate white matter T2 signal abnormality in the periventricular and subcortical  medical conditions         It was my pleasure to evaluate Smooth Easley today.  Please call [phone] with questions.    Eliz Galan MD     FAAPMR

## 2025-06-13 NOTE — PROGRESS NOTES
[facility]  Facility/Department: [address] ORTHO TELE  Speech Language Pathology   Treatment Note      Smooth Easley  1975  [id number]  [x]   confirmed      Date: 2025    TIA (transient ischemic attack) [G45.9]  Stroke due to embolism of right middle cerebral artery (HCC) [I63.411]    Restrictions/Precautions: Fall Risk (Per clinical judgement)    ADULT DIET; Dysphagia - Pureed; Mildly Thick ([place])     Respiratory Status:O2 Flow Rate (L/min): 0 L/min (25 0724)   No active isolations      Subjective:  Alert and Cooperative        Interventions used this date:  Dysphagia Treatment      Objective/Assessment:  Patient progressing towards goals:  Goal 1: Patient will tolerate a puree diet with mildly thick liquids with adequate oral clearance with no overt s/s of difficulty or aspiration.  Pt reports intermittent cough, more so with liquids.  Pt took mildly thick juice from cup x 8 trials.  Pt had anterior spillage of with delayed sensation.  With verbal cues, pt presented cup to right side of mouth with no anterior spillage.  Pt able to carry over this strategy on further trials.  Pt presented with immediate cough x 1.  Trialed chin tuck with no overt s/s of aspiration on 2/2 trials.    Goal 2: Patient will demonstrate recommended swallow strategies for safe and efficient swallow at supervised level.  Goal 3: Patient will complete labial/buccal/lingual ROM/strengthening/coordination exercises 10x/each with min cues, to promote safety and efficiency of oral phase of swallow.  Pt performed labial, buccal, and lingual ROM/strengthening exercises 5x/each with min cues:  open/close mouth, smile, pucker, cheek puff, nose scrunch, lingual protrusion with resistance, and lingual elevation/press.   Goal 4: Patient will complete base of tongue and pharyngeal strengthening exercises 10x/each with min cues, in order to strengthen and establish a more effective swallow.  Goal 5: Additional goals to be added

## 2025-06-13 NOTE — PLAN OF CARE
Problem: Discharge Planning  Goal: Discharge to home or other facility with appropriate resources  6/13/2025 0019 by Lilliana Epps RN  Outcome: Progressing  6/12/2025 1839 by Umm Carrillo RN  Outcome: Progressing     Problem: Safety - Adult  Goal: Free from fall injury  6/13/2025 0019 by Lilliana Epps RN  Outcome: Progressing  6/12/2025 1839 by Umm Carrillo RN  Outcome: Progressing     Problem: Pain  Goal: Verbalizes/displays adequate comfort level or baseline comfort level  6/13/2025 0019 by Lilliana Epps RN  Outcome: Progressing  6/12/2025 1839 by Umm Carrillo RN  Outcome: Progressing     Problem: Chronic Conditions and Co-morbidities  Goal: Patient's chronic conditions and co-morbidity symptoms are monitored and maintained or improved  6/13/2025 0019 by Lilliana Epps RN  Outcome: Progressing  6/12/2025 1839 by Umm Carrillo RN  Outcome: Progressing     Problem: Neurosensory - Adult  Goal: Achieves stable or improved neurological status  6/13/2025 0019 by Lilliana Epps RN  Outcome: Progressing  6/12/2025 1839 by Umm Carrillo RN  Outcome: Progressing  Goal: Achieves maximal functionality and self care  6/13/2025 0019 by Lilliana Epps RN  Outcome: Progressing  6/12/2025 1839 by Umm Carrillo RN  Outcome: Progressing     Problem: Musculoskeletal - Adult  Goal: Return mobility to safest level of function  6/13/2025 0019 by Lilliana Epps RN  Outcome: Progressing  6/12/2025 1839 by Umm Carrillo RN  Outcome: Progressing  Goal: Maintain proper alignment of affected body part  6/13/2025 0019 by Lilliana Epps RN  Outcome: Progressing  6/12/2025 1839 by Umm Carrillo RN  Outcome: Progressing  Goal: Return ADL status to a safe level of function  6/13/2025 0019 by Lilliana Epps RN  Outcome: Progressing  6/12/2025 1839 by Umm Carrillo RN  Outcome: Progressing

## 2025-06-13 NOTE — PROGRESS NOTES
Subjective:  The patient complains of moderate acute on chronic progressive fatigue and weakness  partially relieved by rest, PT, OT and meds   and exacerbated by exertion and recent  .      I am concerned about patient’s medical complexities including:  Principal Problem:    TIA (transient ischemic attack)  Active Problems:    Cerebrovascular accident (CVA) (HCC)    Liver mass, right lobe  Resolved Problems:    * No resolved hospital problems. *      .    Controlled Substance Monitoring:    Acute and Chronic Pain Monitoring:        No data to display                    Reviewed recent nursing note and discussed current status and planned care with acute care providers, \"see notes  \".    ROS x10:  The patient also complains of severely impaired mobility and activities of daily living.  Otherwise no new problems with vision, hearing, nose, mouth, throat, dermal, cardiovascular, GI, , pulmonary, musculoskeletal, psychiatric or neurological.        Vital signs:  BP (!) 123/93   Pulse (!) 108   Temp 98.1 °F (36.7 °C)   Resp 18   Ht 1.626 m (5' 4\")   Wt 77.1 kg (170 lb)   SpO2 94%   BMI 29.18 kg/m²   I/O:   PO/Intake:    fair PO intake, monitoring for dysphagia    Bowel/Bladder:   continent,    General:  Patient is well developed, adequately nourished, and    well kempt.     HEENT:    PERRLA, hearing intact to loud voice, external inspection of ear and nose benign.  Inspection of lips, tongue and gums benign  Musculoskeletal: No significant change in strength or tone.  All joints stable.      Inspection and palpation of digits and nails show no clubbing, cyanosis or inflammatory conditions.   Neuro/Psychiatric: Affect: flat-  Alert and oriented to self and situation with   cues.  No significant change in deep tendon reflexes or sensation  Lungs:  Diminished, CTA-B  .  Respiration effort is normal at rest.   Heart:   S1 = S2,   RRR.      Abdomen:  Soft, non-tender    Extremities:  Trace  lower extremity  lobe  Resolved Problems:    * No resolved hospital problems. *          Events and functional changes in the past 24 hours reviewed continue to monitor closely re: functional status and participation in therapy      Focus of today's plan-     Await biopsy procedures completed prior to rehab transfer, if positive malignancy could explain CVA.       MARCIE Ramirez D.O., FAAPMR  PM&R     Attending    [phone]   [facility]

## 2025-06-14 ENCOUNTER — HOSPITAL ENCOUNTER (INPATIENT)
Age: 50
LOS: 32 days | Discharge: HOME | DRG: 057 | End: 2025-07-16
Attending: PHYSICAL MEDICINE & REHABILITATION | Admitting: PHYSICAL MEDICINE & REHABILITATION
Payer: MEDICAID

## 2025-06-14 VITALS
DIASTOLIC BLOOD PRESSURE: 85 MMHG | HEART RATE: 98 BPM | RESPIRATION RATE: 18 BRPM | TEMPERATURE: 98.8 F | WEIGHT: 170 LBS | OXYGEN SATURATION: 99 % | SYSTOLIC BLOOD PRESSURE: 168 MMHG | HEIGHT: 64 IN | BODY MASS INDEX: 29.02 KG/M2

## 2025-06-14 DIAGNOSIS — G47.01 INSOMNIA SECONDARY TO CHRONIC PAIN: ICD-10-CM

## 2025-06-14 DIAGNOSIS — G89.29 INSOMNIA SECONDARY TO CHRONIC PAIN: ICD-10-CM

## 2025-06-14 DIAGNOSIS — I63.9 CEREBROVASCULAR ACCIDENT (CVA), UNSPECIFIED MECHANISM (HCC): ICD-10-CM

## 2025-06-14 DIAGNOSIS — G89.0 THALAMIC PAIN SYNDROME: Primary | ICD-10-CM

## 2025-06-14 DIAGNOSIS — G43.009 MIGRAINE WITHOUT AURA AND WITHOUT STATUS MIGRAINOSUS, NOT INTRACTABLE: ICD-10-CM

## 2025-06-14 LAB
ALBUMIN SERPL-MCNC: 4.3 G/DL (ref 3.5–4.6)
ALP SERPL-CCNC: 86 U/L (ref 40–130)
ALT SERPL-CCNC: 43 U/L (ref 0–33)
ANION GAP SERPL CALCULATED.3IONS-SCNC: 13 MEQ/L (ref 9–15)
AST SERPL-CCNC: 20 U/L (ref 0–35)
BASOPHILS # BLD: 0 K/UL (ref 0–0.2)
BASOPHILS NFR BLD: 0.4 %
BILIRUB SERPL-MCNC: 0.5 MG/DL (ref 0.2–0.7)
BUN SERPL-MCNC: 19 MG/DL (ref 6–20)
CALCIUM SERPL-MCNC: 9.5 MG/DL (ref 8.5–9.9)
CHLORIDE SERPL-SCNC: 106 MEQ/L (ref 95–107)
CO2 SERPL-SCNC: 23 MEQ/L (ref 20–31)
CREAT SERPL-MCNC: 0.47 MG/DL (ref 0.5–0.9)
EOSINOPHIL # BLD: 0.2 K/UL (ref 0–0.7)
EOSINOPHIL NFR BLD: 2 %
ERYTHROCYTE [DISTWIDTH] IN BLOOD BY AUTOMATED COUNT: 12.9 % (ref 11.5–14.5)
GLOBULIN SER CALC-MCNC: 2.6 G/DL (ref 2.3–3.5)
GLUCOSE SERPL-MCNC: 114 MG/DL (ref 70–99)
HCT VFR BLD AUTO: 45.1 % (ref 37–47)
HGB BLD-MCNC: 15.4 G/DL (ref 12–16)
LYMPHOCYTES # BLD: 2.6 K/UL (ref 1–4.8)
LYMPHOCYTES NFR BLD: 23.9 %
MAGNESIUM SERPL-MCNC: 2 MG/DL (ref 1.7–2.4)
MCH RBC QN AUTO: 30.4 PG (ref 27–31.3)
MCHC RBC AUTO-ENTMCNC: 34.1 % (ref 33–37)
MCV RBC AUTO: 89.1 FL (ref 79.4–94.8)
MONOCYTES # BLD: 0.9 K/UL (ref 0.2–0.8)
MONOCYTES NFR BLD: 8.6 %
MTHFR C.1298A>C GENO BLD/T: NEGATIVE
MTHFR C.677C>T GENO BLD/T: NEGATIVE
MTHFR GENE MUT ANL BLD/T: NORMAL
NEUTROPHILS # BLD: 7 K/UL (ref 1.4–6.5)
NEUTS SEG NFR BLD: 64.7 %
PLATELET # BLD AUTO: 404 K/UL (ref 130–400)
POTASSIUM SERPL-SCNC: 3.8 MEQ/L (ref 3.4–4.9)
PROT SERPL-MCNC: 6.9 G/DL (ref 6.3–8)
RBC # BLD AUTO: 5.06 M/UL (ref 4.2–5.4)
SODIUM SERPL-SCNC: 142 MEQ/L (ref 135–144)
SPECIMEN SOURCE: NORMAL
WBC # BLD AUTO: 10.8 K/UL (ref 4.8–10.8)

## 2025-06-14 PROCEDURE — 36415 COLL VENOUS BLD VENIPUNCTURE: CPT

## 2025-06-14 PROCEDURE — 99232 SBSQ HOSP IP/OBS MODERATE 35: CPT | Performed by: PSYCHIATRY & NEUROLOGY

## 2025-06-14 PROCEDURE — 2500000003 HC RX 250 WO HCPCS: Performed by: NURSE PRACTITIONER

## 2025-06-14 PROCEDURE — 1180000000 HC REHAB R&B

## 2025-06-14 PROCEDURE — 6370000000 HC RX 637 (ALT 250 FOR IP): Performed by: INTERNAL MEDICINE

## 2025-06-14 PROCEDURE — 80053 COMPREHEN METABOLIC PANEL: CPT

## 2025-06-14 PROCEDURE — 6360000002 HC RX W HCPCS: Performed by: NURSE PRACTITIONER

## 2025-06-14 PROCEDURE — 6370000000 HC RX 637 (ALT 250 FOR IP): Performed by: NURSE PRACTITIONER

## 2025-06-14 PROCEDURE — 85025 COMPLETE CBC W/AUTO DIFF WBC: CPT

## 2025-06-14 PROCEDURE — 93005 ELECTROCARDIOGRAM TRACING: CPT | Performed by: PHYSICAL MEDICINE & REHABILITATION

## 2025-06-14 PROCEDURE — 83735 ASSAY OF MAGNESIUM: CPT

## 2025-06-14 RX ORDER — HYDRALAZINE HYDROCHLORIDE 25 MG/1
25 TABLET, FILM COATED ORAL EVERY 8 HOURS PRN
Status: DISCONTINUED | OUTPATIENT
Start: 2025-06-14 | End: 2025-06-15

## 2025-06-14 RX ORDER — CLOPIDOGREL BISULFATE 75 MG/1
75 TABLET ORAL DAILY
Status: CANCELLED | OUTPATIENT
Start: 2025-06-15

## 2025-06-14 RX ORDER — ATORVASTATIN CALCIUM 80 MG/1
80 TABLET, FILM COATED ORAL NIGHTLY
Status: CANCELLED | OUTPATIENT
Start: 2025-06-14

## 2025-06-14 RX ORDER — CLONIDINE HYDROCHLORIDE 0.1 MG/1
0.1 TABLET ORAL 2 TIMES DAILY
Status: CANCELLED | OUTPATIENT
Start: 2025-06-14

## 2025-06-14 RX ORDER — ENOXAPARIN SODIUM 100 MG/ML
40 INJECTION SUBCUTANEOUS DAILY
Status: DISCONTINUED | OUTPATIENT
Start: 2025-06-15 | End: 2025-07-16 | Stop reason: HOSPADM

## 2025-06-14 RX ORDER — CLOPIDOGREL BISULFATE 75 MG/1
75 TABLET ORAL DAILY
Status: DISCONTINUED | OUTPATIENT
Start: 2025-06-15 | End: 2025-07-16 | Stop reason: HOSPADM

## 2025-06-14 RX ORDER — ACETAMINOPHEN 325 MG/1
650 TABLET ORAL EVERY 4 HOURS PRN
Status: DISCONTINUED | OUTPATIENT
Start: 2025-06-14 | End: 2025-07-16 | Stop reason: HOSPADM

## 2025-06-14 RX ORDER — ATORVASTATIN CALCIUM 80 MG/1
80 TABLET, FILM COATED ORAL NIGHTLY
Status: DISCONTINUED | OUTPATIENT
Start: 2025-06-14 | End: 2025-07-16 | Stop reason: HOSPADM

## 2025-06-14 RX ORDER — SODIUM CHLORIDE 9 MG/ML
INJECTION, SOLUTION INTRAVENOUS PRN
Status: DISCONTINUED | OUTPATIENT
Start: 2025-06-14 | End: 2025-07-16 | Stop reason: HOSPADM

## 2025-06-14 RX ORDER — ACETAMINOPHEN 325 MG/1
650 TABLET ORAL EVERY 4 HOURS PRN
Status: CANCELLED | OUTPATIENT
Start: 2025-06-14

## 2025-06-14 RX ORDER — SODIUM CHLORIDE 9 MG/ML
INJECTION, SOLUTION INTRAVENOUS PRN
Status: CANCELLED | OUTPATIENT
Start: 2025-06-14

## 2025-06-14 RX ORDER — CLONIDINE HYDROCHLORIDE 0.1 MG/1
0.1 TABLET ORAL 2 TIMES DAILY
Status: DISCONTINUED | OUTPATIENT
Start: 2025-06-14 | End: 2025-06-17

## 2025-06-14 RX ORDER — ENOXAPARIN SODIUM 100 MG/ML
40 INJECTION SUBCUTANEOUS DAILY
Status: CANCELLED | OUTPATIENT
Start: 2025-06-15

## 2025-06-14 RX ADMIN — ENOXAPARIN SODIUM 40 MG: 100 INJECTION SUBCUTANEOUS at 09:57

## 2025-06-14 RX ADMIN — CLONIDINE HYDROCHLORIDE 0.1 MG: 0.1 TABLET ORAL at 21:56

## 2025-06-14 RX ADMIN — ATORVASTATIN CALCIUM 80 MG: 80 TABLET, FILM COATED ORAL at 21:56

## 2025-06-14 RX ADMIN — CLONIDINE HYDROCHLORIDE 0.1 MG: 0.1 TABLET ORAL at 09:57

## 2025-06-14 RX ADMIN — Medication 10 ML: at 09:57

## 2025-06-14 RX ADMIN — CLOPIDOGREL BISULFATE 75 MG: 75 TABLET, FILM COATED ORAL at 09:57

## 2025-06-14 ASSESSMENT — ENCOUNTER SYMPTOMS
WHEEZING: 0
CHEST TIGHTNESS: 0
COLOR CHANGE: 0
TROUBLE SWALLOWING: 1
NAUSEA: 0
VOMITING: 0
SHORTNESS OF BREATH: 0
COUGH: 0

## 2025-06-14 NOTE — PROGRESS NOTES
Case discussed with neurology, ok to dc to acute rehab on plavix and statin.      TATIANA EATON MD

## 2025-06-14 NOTE — CARE COORDINATION
CALL RECEIVED FROM DR EATON, DC ORDER IN FOR PT AND WANTS TO KNOW IF PT CAN DC TO REHAB TODAY.    CALL TO REHAB LIASION RONNY.  SHE REFUSES TO TAKE PATIENT BASED ON SPECIALTY TESTING AND SPECIALTY CONSULT SIGN OFF.  CM SAID SHE WOULD HAVE DR EATON SPEAK WITH HER AS HE FEELS PT IS READY FOR DC.    DURING CONVERSATION ONC AND NEPHRO SIGNED OFF.  ALL TESTING OR FOLLOW UP WILL BE OUTPT PER UMA.    UMA CALLED REHAB BACK AND NO ANSWER, CALL THEN TO HOUSE SUPERVISOR DAYNE, HE PLANS TO CALL REHAB AND WORK ON PT'S DC.

## 2025-06-14 NOTE — CARE COORDINATION
Talked with ABBY, original number provided to Dr. Milian was the Rehab floor. Spoke to Ivan the Supervisor, he provided Dr. Milian the number in Rehab Admissions, [phone]. Left a voice message for Kathe concerning guidance. In the meantime, called Dr. Todd on my cell, was talking with her when Dr. Milian called. They were both able to talk to one another. Dr. Milian stated that the MRI, fluoroscopy and biopsy would be completed as outpatient. Dr. Todd asked Dr. Milian that he stands behind the decision to discharge and readmit to Rehab and he stated that he would. Will continue to monitor for charting from doctors. Dr. Todd IRF will do what is best for the patient and that is to admit her to Rehab where Neuro and Oncology can see the patient. Received call back from Kathe, Rehab Supervisor. She is in agreement that it is best for the patient to bring to Rehab. Patient will admit into Room 248. Called Ivan, Nursing supervisor and provided him an update. He said \"thank you for keeping him in the loop\". Attempted to call ABBY, no answer, left message for her to call this nurse. Called 2[facility], spoke to Pat the receptions and provided room Number 248. She stated that she would let the nurse know. Will continue to monitor the patient's admit to IRF. Electronically signed by Eleonora Egan RN on 6/14/25 at 1:26 PM EDT

## 2025-06-14 NOTE — PLAN OF CARE
Problem: Discharge Planning  Goal: Discharge to home or other facility with appropriate resources  6/14/2025 1349 by Salena Kaplan RN  Outcome: Progressing     Problem: Safety - Adult  Goal: Free from fall injury  6/14/2025 1349 by Salena Kaplan RN  Outcome: Progressing     Problem: Pain  Goal: Verbalizes/displays adequate comfort level or baseline comfort level  6/14/2025 1349 by Salena Kaplan RN  Outcome: Progressing     Problem: Chronic Conditions and Co-morbidities  Goal: Patient's chronic conditions and co-morbidity symptoms are monitored and maintained or improved  6/14/2025 1349 by Salena Kaplan RN  Outcome: Progressing     Problem: Neurosensory - Adult  Goal: Achieves stable or improved neurological status  6/14/2025 1349 by Salena Kaplan RN  Outcome: Progressing     Problem: Neurosensory - Adult  Goal: Achieves maximal functionality and self care  6/14/2025 1349 by Salena Kaplan RN  Outcome: Progressing     Problem: Musculoskeletal - Adult  Goal: Return mobility to safest level of function  6/14/2025 1349 by Salena Kaplan RN  Outcome: Progressing     Problem: Musculoskeletal - Adult  Goal: Maintain proper alignment of affected body part  6/14/2025 1349 by Salena Kaplan RN  Outcome: Progressing     Problem: Musculoskeletal - Adult  Goal: Return ADL status to a safe level of function  6/14/2025 1349 by Salena Kaplan RN  Outcome: Progressing

## 2025-06-14 NOTE — FLOWSHEET NOTE
Patient arrived to room 261 from [facility]. Patient denies chest pain, SOB, or dizziness. A #20 to Rt AC. Patient assessment completed. Irregular heart rate. High BP. MD notified. ECG revealed: sinus tachy with pac. MD notified. APRESOLINE given per MAR. Please look at flowsheet for other specifics. A&Ox4. LBM 6/14/2025. Continent of bowel and bladder. Patient in recliner, Call light within reach, bed alarm on, and daughter at bedside.

## 2025-06-14 NOTE — PROGRESS NOTES
Spiritual Health History and Assessment/Progress Note  [facility]    Advance Care Planning,  ,  ,      Name: Smooth Easley MRN: [id number]    Age: 49 y.o.     Sex: female   Language: English   Buddhist: Non-Bahai   TIA (transient ischemic attack)     Date: 6/14/2025            Total Time Calculated: 33 min              Spiritual Assessment began in [facility] 2W ORTHO TELE        Referral/Consult From: Physician   Encounter Overview/Reason: Advance Care Planning  Service Provided For: Patient and family together    The  visited the pt to fill out ACP Forms, and assign HPOA. The pt and daughter was open and ready to fill out forms. The pt assigned her daughter as HPOA. The  made copies for the pt and daughter. The pt and daughter were grateful for the support. The pt will be transferring to Rehab.     Kallie, Belief, Meaning:   Patient identifies as spiritual, is connected with a kallie tradition or spiritual practice, and has beliefs or practices that help with coping during difficult times  Family/Friends identify as spiritual, are connected with a kallie tradition or spiritual practice, and have beliefs or practices that help with coping during difficult times      Importance and Influence:  Patient has spiritual/personal beliefs that influence decisions regarding their health  Family/Friends have spiritual/personal beliefs that influence decisions regarding the patient's health    Community:  Patient is connected with a spiritual community and feels well-supported. Support system includes: Children, Kallie Community, Friends, and Extended family  Family/Friends are connected with a spiritual community: and feel well-supported. Support system includes: Parent/s, Children, Kallie Community, Friends, and Extended family    Assessment and Plan of Care:     Patient Interventions include: Facilitated expression of thoughts and feelings, Explored spiritual coping/struggle/distress, Engaged  in theological reflection, and Affirmed coping skills/support systems  Family/Friends Interventions include: Facilitated expression of thoughts and feelings, Explored spiritual coping/struggle/distress, Engaged in theological reflection, and Affirmed coping skills/support systems    Patient Plan of Care: Spiritual Care available upon further referral  Family/Friends Plan of Care: Spiritual Care available upon further referral    Electronically signed by Chaplain Peter on 6/14/2025 at 3:45 PM

## 2025-06-14 NOTE — PROGRESS NOTES
[facility] Neurology Daily Progress Note  Name: Smooth Easley  Age: 49 y.o.  Gender: female  CodeStatus: Full Code  Allergies: Aspirin    Chief Complaint:No chief complaint on file.    Primary Care Provider: Diana Gutierrez PA  InpatientTreatment Team: Treatment Team:   Jose A Milian MD Patel, Dhruv R, MD Litam, Patrick P, MD Wehbe, MD Pj Buck, Dinora, APRN - SEJAL Gayle, Nichole Connors, P  Eleonora Egan, Salena Pavon RN Vance, Kelly, RN  Admission Date: 6/10/2025      HPI   Pt seen and examined for neuro follow up.  Patient is alert and oriented x 3, no acute distress, cooperative.  Right gaze deviation improved today.  Left leg weakness at 3 out of 5.  Left arm weakness 0 out of 5.  Denies headache.  Dysphagia with puréed diet and thickened liquids.  Dysarthric.  Patient seen and examined events as noted above      6/14  Patient remained stable with hemiplegia on the left upper and lower extremity.  She has some minimal movement in the left lower extremity.  Gaze preference though improved.  She is able to eat    Vitals:    06/14/25 0957   BP: (!) 153/79   Pulse:    Resp:    Temp:    SpO2:         Review of Systems   Constitutional:  Negative for fatigue and fever.   HENT:  Positive for trouble swallowing. Negative for hearing loss.    Eyes:  Negative for visual disturbance.   Respiratory:  Negative for cough, chest tightness, shortness of breath and wheezing.    Cardiovascular:  Negative for chest pain, palpitations and leg swelling.   Gastrointestinal:  Negative for nausea and vomiting.   Genitourinary:  Negative for difficulty urinating.   Musculoskeletal:  Positive for gait problem.   Skin:  Negative for color change and rash.   Neurological:  Positive for facial asymmetry, speech difficulty and weakness. Negative for dizziness, tremors, seizures, syncope, light-headedness, numbness and headaches.   Psychiatric/Behavioral:  Negative for agitation, confusion and

## 2025-06-14 NOTE — PROGRESS NOTES
Patient will need workup of adrenal mass with hematology/oncology post rehab.  No medical barriers to discharge with outpt follow up.      TATIANA EATON MD

## 2025-06-14 NOTE — PROGRESS NOTES
Hematology/Oncology  Nurse Practitioner Progress Note        CHIEF COMPLAINT/HPI:  48 yo female with PMH of htn, anxiety who presented to the ED from work yesterday with chest pain. She is a current cigarette smoker, about a pack a day. She is supposed to be taking Clonidine for her htn but was not taking it. She was admitted with stroke symptoms with left sided weakness. Workup showed a left adrenal mass measuring 5.2 cm and a right hepatic lobe lesion measuring 3.4 cm for which we are consulted.     REVIEW OF SYSTEMS:    Unremarkable except for symptoms mentioned in HPI.    Current Inpatient Medications:    Current Facility-Administered Medications: cloNIDine (CATAPRES) tablet 0.1 mg, 0.1 mg, Oral, BID  labetalol (NORMODYNE;TRANDATE) injection 10 mg, 10 mg, IntraVENous, Q4H PRN  acetaminophen (TYLENOL) tablet 650 mg, 650 mg, Oral, Q4H PRN  sodium chloride flush 0.9 % injection 5-40 mL, 5-40 mL, IntraVENous, 2 times per day  sodium chloride flush 0.9 % injection 5-40 mL, 5-40 mL, IntraVENous, PRN  0.9 % sodium chloride infusion, , IntraVENous, PRN  ondansetron (ZOFRAN-ODT) disintegrating tablet 4 mg, 4 mg, Oral, Q8H PRN **OR** ondansetron (ZOFRAN) injection 4 mg, 4 mg, IntraVENous, Q6H PRN  polyethylene glycol (GLYCOLAX) packet 17 g, 17 g, Oral, Daily PRN  enoxaparin (LOVENOX) injection 40 mg, 40 mg, SubCUTAneous, Daily  atorvastatin (LIPITOR) tablet 80 mg, 80 mg, Oral, Nightly  clopidogrel (PLAVIX) tablet 75 mg, 75 mg, Oral, Daily    PHYSICAL EXAM:    VITALS:  BP (!) 153/79   Pulse 84   Temp 97.9 °F (36.6 °C) (Oral)   Resp 18   Ht 1.626 m (5' 4\")   Wt 77.1 kg (170 lb)   SpO2 98%   BMI 29.18 kg/m²   INTAKE/OUTPUT:  No intake or output data in the 24 hours ending 06/14/25 1158  CONSTITUTIONAL:  awake, alert, cooperative, no apparent distress, and appears stated age. Pleasant.   ENT:  Normocephalic, external ears without lesions  NECK:  Supple, no adenopathy,  skin normal  LUNGS:  No increased work of  Dinorah    ASSESSMENT AND PLAN:  Patient with new heterogeneous left adrenal mass with calcifications, max axial dimension is 5.2 cm and a right hepatic lobe lesion measuring 3.4 cm.  MRI of the abdomen to better assess masses in liver and on adrenal still pending. IR consulted for possible liver biopsy. Would need to be able to be off of Plavix for 5 days prior to biopsy.        Electronically signed by NAIDA Love CNP on 6/14/25 at 11:58 AM EDT

## 2025-06-14 NOTE — ACP (ADVANCE CARE PLANNING)
Advance Care Planning     Advance Care Planning Inpatient Note  [facility] Department    Today's Date: 6/14/2025  Unit: [facility] 2W ORTHO TELE    Received request from HealthCare Provider.  Upon review of chart and communication with care team, patient's decision making abilities are not in question.. Patient was/were present in the room during visit.    Goals of ACP Conversation:  Discuss advance care planning documents  Facilitate a discussion related to patient's goals of care as they align with the patient's values and beliefs.    Health Care Decision Makers:     No healthcare decision makers have been documented.    Summary:  Verified Documents  Completed New Documents  Verified Healthcare Decision Maker  Updated Healthcare Decision Maker  Documented Next of Kin, per patient report    The  visited the pt to fill out ACP Forms, and assign HPOA. The pt and daughter was open and ready to fill out forms. The pt assigned her daughter as HPOA. The  made copies for the pt and daughter. The pt and daughter were grateful for the support. The pt will be transferring to Rehab.     Advance Care Planning Documents (Patient Wishes):  Healthcare Power of /Advance Directive Appointment of Health Care Agent  Legal Guardianship Document     Assessment:    Interventions:  Provided education on documents for clarity and greater understanding  Discussed and provided education on state decision maker hierarchy  Assisted in the completion of documents according to patient's wishes at this time  Encouraged ongoing ACP conversation with future decision makers and loved ones    Care Preferences Communicated:   No    Outcomes/Plan:  New advance directive completed.  Returned original document(s) to patient, as well as copies for distribution to appointed agents  Copy of advance directive given to staff to scan into medical record.  Routed ACP note to attending provider or other IDT member.  Teach Back Method used

## 2025-06-15 PROBLEM — Z78.9 IMPAIRED MOBILITY AND ACTIVITIES OF DAILY LIVING: Status: ACTIVE | Noted: 2025-06-15

## 2025-06-15 PROBLEM — Z74.09 IMPAIRED MOBILITY AND ACTIVITIES OF DAILY LIVING: Status: ACTIVE | Noted: 2025-06-15

## 2025-06-15 LAB
ALBUMIN SERPL-MCNC: 3.7 G/DL (ref 3.5–4.6)
ALP SERPL-CCNC: 77 U/L (ref 40–130)
ALT SERPL-CCNC: 35 U/L (ref 0–33)
ANION GAP SERPL CALCULATED.3IONS-SCNC: 14 MEQ/L (ref 9–15)
AST SERPL-CCNC: 23 U/L (ref 0–35)
BASOPHILS # BLD: 0 K/UL (ref 0–0.2)
BASOPHILS NFR BLD: 0.3 %
BILIRUB SERPL-MCNC: 0.6 MG/DL (ref 0.2–0.7)
BUN SERPL-MCNC: 18 MG/DL (ref 6–20)
CALCIUM SERPL-MCNC: 9.2 MG/DL (ref 8.5–9.9)
CHLORIDE SERPL-SCNC: 102 MEQ/L (ref 95–107)
CO2 SERPL-SCNC: 22 MEQ/L (ref 20–31)
CREAT SERPL-MCNC: 0.56 MG/DL (ref 0.5–0.9)
EOSINOPHIL # BLD: 0.2 K/UL (ref 0–0.7)
EOSINOPHIL NFR BLD: 1.8 %
ERYTHROCYTE [DISTWIDTH] IN BLOOD BY AUTOMATED COUNT: 12.8 % (ref 11.5–14.5)
GLOBULIN SER CALC-MCNC: 2.9 G/DL (ref 2.3–3.5)
GLUCOSE SERPL-MCNC: 113 MG/DL (ref 70–99)
HCT VFR BLD AUTO: 46 % (ref 37–47)
HGB BLD-MCNC: 15.8 G/DL (ref 12–16)
LYMPHOCYTES # BLD: 2.4 K/UL (ref 1–4.8)
LYMPHOCYTES NFR BLD: 20.3 %
MAGNESIUM SERPL-MCNC: 2 MG/DL (ref 1.7–2.4)
MCH RBC QN AUTO: 30.8 PG (ref 27–31.3)
MCHC RBC AUTO-ENTMCNC: 34.3 % (ref 33–37)
MCV RBC AUTO: 89.7 FL (ref 79.4–94.8)
MONOCYTES # BLD: 0.8 K/UL (ref 0.2–0.8)
MONOCYTES NFR BLD: 7.2 %
NEUTROPHILS # BLD: 8.1 K/UL (ref 1.4–6.5)
NEUTS SEG NFR BLD: 69.8 %
PLATELET # BLD AUTO: 443 K/UL (ref 130–400)
POTASSIUM SERPL-SCNC: 4 MEQ/L (ref 3.4–4.9)
PROT SERPL-MCNC: 6.6 G/DL (ref 6.3–8)
RBC # BLD AUTO: 5.13 M/UL (ref 4.2–5.4)
REASON FOR REJECTION: NORMAL
REJECTED TEST: NORMAL
SODIUM SERPL-SCNC: 138 MEQ/L (ref 135–144)
WBC # BLD AUTO: 11.6 K/UL (ref 4.8–10.8)

## 2025-06-15 PROCEDURE — 85025 COMPLETE CBC W/AUTO DIFF WBC: CPT

## 2025-06-15 PROCEDURE — 6370000000 HC RX 637 (ALT 250 FOR IP): Performed by: INTERNAL MEDICINE

## 2025-06-15 PROCEDURE — 80053 COMPREHEN METABOLIC PANEL: CPT

## 2025-06-15 PROCEDURE — 6360000002 HC RX W HCPCS: Performed by: INTERNAL MEDICINE

## 2025-06-15 PROCEDURE — 92610 EVALUATE SWALLOWING FUNCTION: CPT

## 2025-06-15 PROCEDURE — 83735 ASSAY OF MAGNESIUM: CPT

## 2025-06-15 PROCEDURE — 1180000000 HC REHAB R&B

## 2025-06-15 PROCEDURE — 97167 OT EVAL HIGH COMPLEX 60 MIN: CPT

## 2025-06-15 PROCEDURE — 97163 PT EVAL HIGH COMPLEX 45 MIN: CPT

## 2025-06-15 PROCEDURE — 36415 COLL VENOUS BLD VENIPUNCTURE: CPT

## 2025-06-15 RX ORDER — SODIUM PHOSPHATE, DIBASIC AND SODIUM PHOSPHATE, MONOBASIC 7; 19 G/230ML; G/230ML
1 ENEMA RECTAL DAILY PRN
Status: DISCONTINUED | OUTPATIENT
Start: 2025-06-15 | End: 2025-07-16 | Stop reason: HOSPADM

## 2025-06-15 RX ORDER — ACETAMINOPHEN 325 MG/1
650 TABLET ORAL EVERY 4 HOURS PRN
Status: DISCONTINUED | OUTPATIENT
Start: 2025-06-15 | End: 2025-06-15 | Stop reason: SDUPTHER

## 2025-06-15 RX ORDER — HYDRALAZINE HYDROCHLORIDE 25 MG/1
25 TABLET, FILM COATED ORAL EVERY 12 HOURS SCHEDULED
Status: COMPLETED | OUTPATIENT
Start: 2025-06-15 | End: 2025-06-30

## 2025-06-15 RX ORDER — BISACODYL 10 MG
10 SUPPOSITORY, RECTAL RECTAL DAILY PRN
Status: DISCONTINUED | OUTPATIENT
Start: 2025-06-15 | End: 2025-07-16 | Stop reason: HOSPADM

## 2025-06-15 RX ADMIN — ACETAMINOPHEN 650 MG: 325 TABLET ORAL at 18:40

## 2025-06-15 RX ADMIN — ATORVASTATIN CALCIUM 80 MG: 80 TABLET, FILM COATED ORAL at 21:52

## 2025-06-15 RX ADMIN — ENOXAPARIN SODIUM 40 MG: 100 INJECTION SUBCUTANEOUS at 09:15

## 2025-06-15 RX ADMIN — CLOPIDOGREL BISULFATE 75 MG: 75 TABLET, FILM COATED ORAL at 09:15

## 2025-06-15 RX ADMIN — CLONIDINE HYDROCHLORIDE 0.1 MG: 0.1 TABLET ORAL at 21:52

## 2025-06-15 RX ADMIN — CLONIDINE HYDROCHLORIDE 0.1 MG: 0.1 TABLET ORAL at 09:15

## 2025-06-15 RX ADMIN — HYDRALAZINE HYDROCHLORIDE 25 MG: 25 TABLET, FILM COATED ORAL at 21:52

## 2025-06-15 ASSESSMENT — PAIN SCALES - GENERAL
PAINLEVEL_OUTOF10: 6
PAINLEVEL_OUTOF10: 0
PAINLEVEL_OUTOF10: 0

## 2025-06-15 ASSESSMENT — PAIN DESCRIPTION - ORIENTATION: ORIENTATION: LEFT

## 2025-06-15 ASSESSMENT — PAIN DESCRIPTION - DESCRIPTORS: DESCRIPTORS: ACHING

## 2025-06-15 ASSESSMENT — PAIN DESCRIPTION - LOCATION: LOCATION: RIB CAGE

## 2025-06-15 NOTE — PROGRESS NOTES
Physical Therapy  Facility/Department: [facility] MED SURG W287/W287-01  Physical Therapy Discharge      NAME: Smooth Easley    : 1975 (49 y.o.)  MRN: [id number]    Account: [id number]  Gender: female      Patient has been discharged from acute care hospital. DC patient from current PT program.      Electronically signed by Sameera Neely PT on 6/15/25 at 12:46 PM EDT

## 2025-06-15 NOTE — PLAN OF CARE
Problem: Chronic Conditions and Co-morbidities  Goal: Patient's chronic conditions and co-morbidity symptoms are monitored and maintained or improved  Outcome: Progressing     Problem: Discharge Planning  Goal: Discharge to home or other facility with appropriate resources  Outcome: Progressing     Problem: Chronic Conditions and Co-morbidities  Goal: Patient's chronic conditions and co-morbidity symptoms are monitored and maintained or improved  6/15/2025 0301 by Marika Rose RN  Outcome: Progressing     Problem: Discharge Planning  Goal: Discharge to home or other facility with appropriate resources  6/15/2025 0300 by Marika Rose, RN  Outcome: Progressing

## 2025-06-15 NOTE — H&P
without the administration of intravenous contrast. COMPARISON: None. HISTORY: ORDERING SYSTEM PROVIDED HISTORY: stroke TECHNOLOGIST PROVIDED HISTORY: Reason for exam:->stroke What is the sedation requirement?->None What reading provider will be dictating this exam?->CRC FINDINGS: INTRACRANIAL STRUCTURES/VENTRICLES: There is a large area of restricted diffusion in the anterior right parietal lobe with matching increased T2 signal.  There is no hemorrhage or mass effect.  This is a large non-hemorrhagic acute anterior right MCA infarct.  There is loss of grey-white differentiation on the previous CT head in retrospect. No mass effect or midline shift. No evidence of an acute intracranial hemorrhage. Total microhemorrhages: None. Superficial siderosis: None. The ventricles and sulci are normal in size and configuration. There is moderate white matter T2 signal abnormality in the periventricular and subcortical regions consistent with the patient's age. The sellar/suprasellar regions appear unremarkable. The normal signal voids within the major intracranial vessels appear maintained. ORBITS: The visualized portion of the orbits demonstrate no acute abnormality. SINUSES: The visualized paranasal sinuses and mastoid air cells demonstrate no acute abnormality. BONES/SOFT TISSUES: The bone marrow signal intensity appears normal. The soft tissues demonstrate no acute abnormality.     1. Large non-hemorrhagic acute anterior right MCA infarct. No mass effect or midline shift. 2. Moderate white matter T2 signal abnormality consistent with the patient's age.     CT HEAD WO CONTRAST  Addendum Date: 6/11/2025  ADDENDUM: Results were conveyed to Dr. Loyola via telephone at 1:01 a.m..     Result Date: 6/11/2025  EXAMINATION: CT OF THE HEAD WITHOUT CONTRAST  6/11/2025 12:38 am TECHNIQUE: CT of the head was performed without the administration of intravenous contrast. Automated exposure control, iterative reconstruction, and/or

## 2025-06-15 NOTE — FLOWSHEET NOTE
Pt assessment completed. Alert et oriented x4. Denies chest pain,sob or nausea. Left upper and lower extremity flaccid. Pt is a two  person assist with  transfers. Call light within reach.

## 2025-06-15 NOTE — PLAN OF CARE
Patient is a new admission- is currently being evaluated, but is excited to be here and ready to feel better.

## 2025-06-15 NOTE — PLAN OF CARE
BSE Completed   Subjective:   57 y.o. female for Well Woman Check. Her gyne and breast care is done elsewhere by her Ob-Gyne physician. Home BP readings are stable;  130s-140/70s-80  She has been well; She has been telecommuting. Has had labs done. Patient Active Problem List    Diagnosis Date Noted    Arthritis     Carpal tunnel syndrome     Hypertension     Right wrist pain     Stenosing tenosynovitis of wrist     Anemia     Hot flashes     Prediabetes     De Quervain's tenosynovitis, left 12/22/2015    HTN (hypertension) 08/16/2010    Hypercholesterolemia 08/16/2010     Current Outpatient Medications   Medication Sig Dispense Refill    hydroCHLOROthiazide (HYDRODIURIL) 25 mg tablet Take 1 Tab by mouth daily. 30 Tab 6    metoprolol succinate (TOPROL-XL) 100 mg tablet Take 1 Tab by mouth daily. 30 Tab 6    multivitamin with minerals (HAIR,SKIN AND NAILS PO) Take  by mouth.  GLUCOSAM/CHOND/HYALU/CF BORATE (MOVE FREE JOINT HEALTH PO) Take  by mouth.  ethinyl estradiol-norethindron (JINTELI) 1-5 mg-mcg Tab Take 1 Tab by mouth daily.  psyllium seed-sucrose (METAMUCIL) Powd Take  by mouth.  MULTI-VITAMIN PO Take 1 Tab by mouth daily. Allergies   Allergen Reactions    Codeine Nausea and Vomiting     Violent vomiting    Ace Inhibitors Cough    Clonidine Other (comments)     Visual disturbance    Hyzaar [Losartan-Hydrochlorothiazide] Other (comments)     Palpitations.  Able to tolerate HCTZ     Past Medical History:   Diagnosis Date    Anemia     Anemia NEC     Carpal tunnel syndrome     without loss of sensation in the right hand    Hot flashes     Hypercholesterolemia     Hypercholesterolemia 8/16/2010    Hypertension     Numbness and tingling     fingers    Prediabetes     Right wrist pain     Stenosing tenosynovitis of wrist     first dorsal compartment, right wrist     Past Surgical History:   Procedure Laterality Date    HX GYN      Fibroid Emobliation 2001    HX GYN      BTL 1979     Family History   Problem Relation Age of Onset    Hypertension Mother     Heart Disease Mother     Hypertension Father     Hypertension Brother     Arthritis-osteo Other         Lab Results   Component Value Date/Time    WBC 8.1 11/15/2017 04:35 PM    HGB 12.7 11/15/2017 04:35 PM    HCT 37.8 11/15/2017 04:35 PM    PLATELET 916 20/89/4587 04:35 PM    MCV 93.6 11/15/2017 04:35 PM     Lab Results   Component Value Date/Time    Hemoglobin A1c 6.1 (H) 03/11/2020 08:30 AM    Hemoglobin A1c 6.3 (H) 07/03/2019 07:37 AM    Hemoglobin A1c 6.0 (H) 11/07/2017 06:30 AM    Glucose 100 (H) 03/11/2020 08:30 AM    LDL, calculated 102 (H) 03/11/2020 08:30 AM    Creatinine 1.03 03/11/2020 08:30 AM      Lab Results   Component Value Date/Time    Cholesterol, total 157 03/11/2020 08:30 AM    HDL Cholesterol 36 (L) 03/11/2020 08:30 AM    LDL, calculated 102 (H) 03/11/2020 08:30 AM    Triglyceride 95 03/11/2020 08:30 AM    CHOL/HDL Ratio 4.4 03/11/2020 08:30 AM        ROS: Feeling generally well. No TIA's or unusual headaches, no dysphagia. No prolonged cough. No dyspnea or chest pain on exertion. No abdominal pain, change in bowel habits, black or bloody stools. No urinary tract symptoms. No new or unusual musculoskeletal symptoms. Specific concerns today: none, doing well. .    Objective: The patient appears well, alert, oriented x 3, in no distress. Visit Vitals  /70   Pulse 65   Temp 98.1 °F (36.7 °C) (Oral)   Resp 16   Ht 5' 9\" (1.753 m)   Wt 167 lb (75.8 kg)   SpO2 99%   BMI 24.66 kg/m²     ENT normal.  Neck supple. No adenopathy or thyromegaly. SHERI. Lungs are clear, good air entry, no wheezes, rhonchi or rales. S1 and S2 normal, no murmurs, regular rate and rhythm. Abdomen soft without tenderness, guarding, mass or organomegaly. Extremities show no edema, normal peripheral pulses. Neurological is normal, no focal findings. Breast and Pelvic exams are deferred.     Lab Results   Component Value Date/Time    Cholesterol, total 157 03/11/2020 08:30 AM    HDL Cholesterol 36 (L) 03/11/2020 08:30 AM    LDL, calculated 102 (H) 03/11/2020 08:30 AM    VLDL, calculated 19 03/11/2020 08:30 AM    Triglyceride 95 03/11/2020 08:30 AM    CHOL/HDL Ratio 4.4 03/11/2020 08:30 AM     Lab Results   Component Value Date/Time    Sodium 143 03/11/2020 08:30 AM    Potassium 4.0 03/11/2020 08:30 AM    Chloride 108 03/11/2020 08:30 AM    CO2 29 03/11/2020 08:30 AM    Anion gap 6 03/11/2020 08:30 AM    Glucose 100 (H) 03/11/2020 08:30 AM    BUN 16 03/11/2020 08:30 AM    Creatinine 1.03 03/11/2020 08:30 AM    BUN/Creatinine ratio 16 03/11/2020 08:30 AM    GFR est AA >60 03/11/2020 08:30 AM    GFR est non-AA 54 (L) 03/11/2020 08:30 AM    Calcium 9.5 03/11/2020 08:30 AM     Lab Results   Component Value Date/Time    Hemoglobin A1c 6.1 (H) 03/11/2020 08:30 AM         Assessment/Plan:   Well Woman      ICD-10-CM ICD-9-CM    1. Well woman exam (no gynecological exam) Z00.00 V70.0     [V70.0]   2. Essential hypertension- for refill only I10 401.9 hydroCHLOROthiazide (HYDRODIURIL) 25 mg tablet       As above,   above all stable unless otherwise noted   treatment plan as listed below  Orders Placed This Encounter    hydroCHLOROthiazide (HYDRODIURIL) 25 mg tablet    metoprolol succinate (TOPROL-XL) 100 mg tablet     Refilled meds needed  Follow-up and Dispositions    · Return in about 6 months (around 9/30/2020) for htn, prediabetes. An After Visit Summary was printed and given to the patient. This has been fully explained to the patient, who indicates understanding.   Monitor BPs

## 2025-06-16 PROBLEM — E27.8 ADRENAL MASS: Status: ACTIVE | Noted: 2025-06-16

## 2025-06-16 PROBLEM — R47.01 APHASIA: Status: ACTIVE | Noted: 2025-06-16

## 2025-06-16 PROBLEM — R13.12 DYSPHAGIA, OROPHARYNGEAL PHASE: Status: ACTIVE | Noted: 2025-06-16

## 2025-06-16 LAB
APTT SCREEN TO CONFIRM RATIO: 0.92 {RATIO}
CONFIRM DRVVT STA-STACLOT: NORMAL S
DRVVT SCREEN TO CONFIRM RATIO: 0.85 {RATIO}
DRVVT SCREEN TO CONFIRM RATIO: NORMAL {RATIO}
DRVVT/DRVVT CFM P DOAC NEUT NORM PPP-RTO: NORMAL {RATIO}
EKG ATRIAL RATE: 103 BPM
EKG DIAGNOSIS: NORMAL
EKG P AXIS: 52 DEGREES
EKG P-R INTERVAL: 118 MS
EKG Q-T INTERVAL: 342 MS
EKG QRS DURATION: 80 MS
EKG QTC CALCULATION (BAZETT): 448 MS
EKG R AXIS: 40 DEGREES
EKG T AXIS: 33 DEGREES
EKG VENTRICULAR RATE: 103 BPM
F5 P.R506Q BLD/T QL: NEGATIVE
HEPARIN NT PPP QL: NORMAL
LA 3 SCREEN W REFLEX-IMP: NORMAL
LMW HEPARIN IND PLT AB SER QL: NORMAL
MIXING DRVVT: NORMAL S
PROTHROMBIN TIME: 13.4 S (ref 12–15.5)
SCREEN APTT: NORMAL S
THROMBIN TIME: NORMAL S
TSH SERPL-MCNC: 3.99 UIU/ML (ref 0.44–3.86)
VITAMIN D 25-HYDROXY: 14.9 NG/ML (ref 30–100)

## 2025-06-16 PROCEDURE — 92523 SPEECH SOUND LANG COMPREHEN: CPT

## 2025-06-16 PROCEDURE — 6370000000 HC RX 637 (ALT 250 FOR IP): Performed by: INTERNAL MEDICINE

## 2025-06-16 PROCEDURE — 99253 IP/OBS CNSLTJ NEW/EST LOW 45: CPT | Performed by: NURSE PRACTITIONER

## 2025-06-16 PROCEDURE — 99233 SBSQ HOSP IP/OBS HIGH 50: CPT | Performed by: PHYSICAL MEDICINE & REHABILITATION

## 2025-06-16 PROCEDURE — 92526 ORAL FUNCTION THERAPY: CPT

## 2025-06-16 PROCEDURE — 97112 NEUROMUSCULAR REEDUCATION: CPT

## 2025-06-16 PROCEDURE — 6360000002 HC RX W HCPCS: Performed by: INTERNAL MEDICINE

## 2025-06-16 PROCEDURE — 97535 SELF CARE MNGMENT TRAINING: CPT

## 2025-06-16 PROCEDURE — 1180000000 HC REHAB R&B

## 2025-06-16 PROCEDURE — 84443 ASSAY THYROID STIM HORMONE: CPT

## 2025-06-16 PROCEDURE — 6360000002 HC RX W HCPCS: Performed by: PHYSICAL MEDICINE & REHABILITATION

## 2025-06-16 PROCEDURE — 82306 VITAMIN D 25 HYDROXY: CPT

## 2025-06-16 PROCEDURE — 97110 THERAPEUTIC EXERCISES: CPT

## 2025-06-16 PROCEDURE — 6370000000 HC RX 637 (ALT 250 FOR IP): Performed by: PHYSICAL MEDICINE & REHABILITATION

## 2025-06-16 PROCEDURE — 36415 COLL VENOUS BLD VENIPUNCTURE: CPT

## 2025-06-16 RX ORDER — LIDOCAINE 4 G/G
3 PATCH TOPICAL DAILY
Status: DISCONTINUED | OUTPATIENT
Start: 2025-06-16 | End: 2025-07-16 | Stop reason: HOSPADM

## 2025-06-16 RX ORDER — VITAMIN B COMPLEX
2000 TABLET ORAL
Status: DISCONTINUED | OUTPATIENT
Start: 2025-06-16 | End: 2025-07-16 | Stop reason: HOSPADM

## 2025-06-16 RX ORDER — CYANOCOBALAMIN 1000 UG/ML
1000 INJECTION, SOLUTION INTRAMUSCULAR; SUBCUTANEOUS WEEKLY
Status: DISCONTINUED | OUTPATIENT
Start: 2025-06-16 | End: 2025-07-16 | Stop reason: HOSPADM

## 2025-06-16 RX ORDER — UBIDECARENONE 100 MG
100 CAPSULE ORAL DAILY
Status: DISCONTINUED | OUTPATIENT
Start: 2025-06-16 | End: 2025-07-16 | Stop reason: HOSPADM

## 2025-06-16 RX ADMIN — CLONIDINE HYDROCHLORIDE 0.1 MG: 0.1 TABLET ORAL at 20:04

## 2025-06-16 RX ADMIN — Medication 100 MG: at 16:58

## 2025-06-16 RX ADMIN — CLOPIDOGREL BISULFATE 75 MG: 75 TABLET, FILM COATED ORAL at 10:17

## 2025-06-16 RX ADMIN — CLONIDINE HYDROCHLORIDE 0.1 MG: 0.1 TABLET ORAL at 10:17

## 2025-06-16 RX ADMIN — HYDRALAZINE HYDROCHLORIDE 25 MG: 25 TABLET, FILM COATED ORAL at 20:04

## 2025-06-16 RX ADMIN — HYDRALAZINE HYDROCHLORIDE 25 MG: 25 TABLET, FILM COATED ORAL at 10:17

## 2025-06-16 RX ADMIN — ACETAMINOPHEN 650 MG: 325 TABLET ORAL at 10:16

## 2025-06-16 RX ADMIN — ATORVASTATIN CALCIUM 80 MG: 80 TABLET, FILM COATED ORAL at 20:04

## 2025-06-16 RX ADMIN — CYANOCOBALAMIN 1000 MCG: 1000 INJECTION, SOLUTION INTRAMUSCULAR; SUBCUTANEOUS at 17:01

## 2025-06-16 RX ADMIN — Medication 2000 UNITS: at 16:58

## 2025-06-16 RX ADMIN — ENOXAPARIN SODIUM 40 MG: 100 INJECTION SUBCUTANEOUS at 10:16

## 2025-06-16 ASSESSMENT — PAIN SCALES - GENERAL
PAINLEVEL_OUTOF10: 0
PAINLEVEL_OUTOF10: 5

## 2025-06-16 ASSESSMENT — PAIN DESCRIPTION - LOCATION: LOCATION: RIB CAGE

## 2025-06-16 ASSESSMENT — PAIN DESCRIPTION - ORIENTATION: ORIENTATION: LEFT

## 2025-06-16 NOTE — PLAN OF CARE
Problem: Chronic Conditions and Co-morbidities  Goal: Patient's chronic conditions and co-morbidity symptoms are monitored and maintained or improved  Outcome: Progressing  Flowsheets (Taken 6/15/2025 2100)  Care Plan - Patient's Chronic Conditions and Co-Morbidity Symptoms are Monitored and Maintained or Improved: Monitor and assess patient's chronic conditions and comorbid symptoms for stability, deterioration, or improvement     Problem: Discharge Planning  Goal: Discharge to home or other facility with appropriate resources  Outcome: Progressing  Flowsheets (Taken 6/15/2025 2100)  Discharge to home or other facility with appropriate resources: Identify barriers to discharge with patient and caregiver     Problem: Pain  Goal: Verbalizes/displays adequate comfort level or baseline comfort level  Outcome: Progressing  Flowsheets (Taken 6/15/2025 2050)  Verbalizes/displays adequate comfort level or baseline comfort level: Encourage patient to monitor pain and request assistance     Problem: Safety - Adult  Goal: Free from fall injury  Outcome: Progressing     Problem: Skin/Tissue Integrity  Goal: Skin integrity remains intact  Description: 1.  Monitor for areas of redness and/or skin breakdown  2.  Assess vascular access sites hourly  3.  Every 4-6 hours minimum:  Change oxygen saturation probe site  4.  Every 4-6 hours:  If on nasal continuous positive airway pressure, respiratory therapy assess nares and determine need for appliance change or resting period  Outcome: Progressing  Flowsheets (Taken 6/16/2025 0423)  Skin Integrity Remains Intact: Monitor for areas of redness and/or skin breakdown

## 2025-06-16 NOTE — PLAN OF CARE
Problem: Chronic Conditions and Co-morbidities  Goal: Patient's chronic conditions and co-morbidity symptoms are monitored and maintained or improved  6/16/2025 1142 by Mary Jacobo RN  Outcome: Progressing  6/16/2025 0423 by Doreen Wesley RN  Outcome: Progressing  Flowsheets (Taken 6/15/2025 2100)  Care Plan - Patient's Chronic Conditions and Co-Morbidity Symptoms are Monitored and Maintained or Improved: Monitor and assess patient's chronic conditions and comorbid symptoms for stability, deterioration, or improvement     Problem: Discharge Planning  Goal: Discharge to home or other facility with appropriate resources  6/16/2025 1142 by Mary Jacobo RN  Outcome: Progressing  6/16/2025 0423 by Doreen Wesley RN  Outcome: Progressing  Flowsheets (Taken 6/15/2025 2100)  Discharge to home or other facility with appropriate resources: Identify barriers to discharge with patient and caregiver     Problem: Pain  Goal: Verbalizes/displays adequate comfort level or baseline comfort level  6/16/2025 1142 by Mary Jacobo RN  Outcome: Progressing  6/16/2025 0423 by Doreen Wesley RN  Outcome: Progressing  Flowsheets (Taken 6/15/2025 2050)  Verbalizes/displays adequate comfort level or baseline comfort level: Encourage patient to monitor pain and request assistance     Problem: Safety - Adult  Goal: Free from fall injury  6/16/2025 1142 by Mary Jacobo RN  Outcome: Progressing  6/16/2025 0423 by Doreen Wesley RN  Outcome: Progressing     Problem: Skin/Tissue Integrity  Goal: Skin integrity remains intact  Description: 1.  Monitor for areas of redness and/or skin breakdown  2.  Assess vascular access sites hourly  3.  Every 4-6 hours minimum:  Change oxygen saturation probe site  4.  Every 4-6 hours:  If on nasal continuous positive airway pressure, respiratory therapy assess nares and determine need for appliance change or resting period  6/16/2025 1142 by

## 2025-06-17 PROBLEM — F33.1 MDD (MAJOR DEPRESSIVE DISORDER), RECURRENT EPISODE, MODERATE (HCC): Status: ACTIVE | Noted: 2025-06-17

## 2025-06-17 PROCEDURE — 97542 WHEELCHAIR MNGMENT TRAINING: CPT

## 2025-06-17 PROCEDURE — 97530 THERAPEUTIC ACTIVITIES: CPT

## 2025-06-17 PROCEDURE — 97760 ORTHOTIC MGMT&TRAING 1ST ENC: CPT

## 2025-06-17 PROCEDURE — 92526 ORAL FUNCTION THERAPY: CPT

## 2025-06-17 PROCEDURE — 97032 APPL MODALITY 1+ESTIM EA 15: CPT

## 2025-06-17 PROCEDURE — 97110 THERAPEUTIC EXERCISES: CPT

## 2025-06-17 PROCEDURE — 1180000000 HC REHAB R&B

## 2025-06-17 PROCEDURE — 6360000002 HC RX W HCPCS: Performed by: INTERNAL MEDICINE

## 2025-06-17 PROCEDURE — 6370000000 HC RX 637 (ALT 250 FOR IP): Performed by: PHYSICAL MEDICINE & REHABILITATION

## 2025-06-17 PROCEDURE — 97533 SENSORY INTEGRATION: CPT

## 2025-06-17 PROCEDURE — 97116 GAIT TRAINING THERAPY: CPT

## 2025-06-17 PROCEDURE — 6370000000 HC RX 637 (ALT 250 FOR IP): Performed by: INTERNAL MEDICINE

## 2025-06-17 PROCEDURE — 6370000000 HC RX 637 (ALT 250 FOR IP): Performed by: PSYCHIATRY & NEUROLOGY

## 2025-06-17 PROCEDURE — 97535 SELF CARE MNGMENT TRAINING: CPT

## 2025-06-17 PROCEDURE — 92507 TX SP LANG VOICE COMM INDIV: CPT

## 2025-06-17 PROCEDURE — 97112 NEUROMUSCULAR REEDUCATION: CPT

## 2025-06-17 PROCEDURE — 90792 PSYCH DIAG EVAL W/MED SRVCS: CPT | Performed by: PSYCHIATRY & NEUROLOGY

## 2025-06-17 PROCEDURE — 99232 SBSQ HOSP IP/OBS MODERATE 35: CPT | Performed by: PHYSICAL MEDICINE & REHABILITATION

## 2025-06-17 RX ORDER — CLONIDINE HYDROCHLORIDE 0.1 MG/1
0.1 TABLET ORAL 3 TIMES DAILY
Status: DISCONTINUED | OUTPATIENT
Start: 2025-06-17 | End: 2025-06-22

## 2025-06-17 RX ORDER — SERTRALINE HYDROCHLORIDE 25 MG/1
25 TABLET, FILM COATED ORAL DAILY
Status: DISCONTINUED | OUTPATIENT
Start: 2025-06-17 | End: 2025-07-16 | Stop reason: HOSPADM

## 2025-06-17 RX ORDER — MECOBALAMIN 5000 MCG
5 TABLET,DISINTEGRATING ORAL NIGHTLY
Status: DISCONTINUED | OUTPATIENT
Start: 2025-06-17 | End: 2025-07-16 | Stop reason: HOSPADM

## 2025-06-17 RX ADMIN — HYDRALAZINE HYDROCHLORIDE 25 MG: 25 TABLET, FILM COATED ORAL at 07:49

## 2025-06-17 RX ADMIN — Medication 2000 UNITS: at 16:29

## 2025-06-17 RX ADMIN — ENOXAPARIN SODIUM 40 MG: 100 INJECTION SUBCUTANEOUS at 07:49

## 2025-06-17 RX ADMIN — CLONIDINE HYDROCHLORIDE 0.1 MG: 0.1 TABLET ORAL at 16:13

## 2025-06-17 RX ADMIN — ATORVASTATIN CALCIUM 80 MG: 80 TABLET, FILM COATED ORAL at 21:01

## 2025-06-17 RX ADMIN — Medication 100 MG: at 07:49

## 2025-06-17 RX ADMIN — CLONIDINE HYDROCHLORIDE 0.1 MG: 0.1 TABLET ORAL at 07:50

## 2025-06-17 RX ADMIN — CLOPIDOGREL BISULFATE 75 MG: 75 TABLET, FILM COATED ORAL at 07:50

## 2025-06-17 RX ADMIN — ACETAMINOPHEN 650 MG: 325 TABLET ORAL at 06:40

## 2025-06-17 RX ADMIN — HYDRALAZINE HYDROCHLORIDE 25 MG: 25 TABLET, FILM COATED ORAL at 21:01

## 2025-06-17 RX ADMIN — CLONIDINE HYDROCHLORIDE 0.1 MG: 0.1 TABLET ORAL at 21:01

## 2025-06-17 RX ADMIN — Medication 5 MG: at 21:00

## 2025-06-17 RX ADMIN — SERTRALINE 25 MG: 25 TABLET, FILM COATED ORAL at 16:14

## 2025-06-17 ASSESSMENT — PAIN DESCRIPTION - DESCRIPTORS: DESCRIPTORS: ACHING

## 2025-06-17 ASSESSMENT — PAIN SCALES - GENERAL
PAINLEVEL_OUTOF10: 8
PAINLEVEL_OUTOF10: 0

## 2025-06-17 ASSESSMENT — PAIN DESCRIPTION - LOCATION: LOCATION: BACK

## 2025-06-17 ASSESSMENT — PAIN DESCRIPTION - ORIENTATION: ORIENTATION: LOWER

## 2025-06-18 PROBLEM — F43.21 GRIEF: Status: ACTIVE | Noted: 2025-06-18

## 2025-06-18 PROCEDURE — 6360000002 HC RX W HCPCS: Performed by: INTERNAL MEDICINE

## 2025-06-18 PROCEDURE — 97535 SELF CARE MNGMENT TRAINING: CPT

## 2025-06-18 PROCEDURE — 6370000000 HC RX 637 (ALT 250 FOR IP): Performed by: INTERNAL MEDICINE

## 2025-06-18 PROCEDURE — 99231 SBSQ HOSP IP/OBS SF/LOW 25: CPT | Performed by: NURSE PRACTITIONER

## 2025-06-18 PROCEDURE — 97110 THERAPEUTIC EXERCISES: CPT

## 2025-06-18 PROCEDURE — 6370000000 HC RX 637 (ALT 250 FOR IP): Performed by: PSYCHIATRY & NEUROLOGY

## 2025-06-18 PROCEDURE — 99232 SBSQ HOSP IP/OBS MODERATE 35: CPT | Performed by: PHYSICAL MEDICINE & REHABILITATION

## 2025-06-18 PROCEDURE — 92526 ORAL FUNCTION THERAPY: CPT

## 2025-06-18 PROCEDURE — 97116 GAIT TRAINING THERAPY: CPT

## 2025-06-18 PROCEDURE — 6370000000 HC RX 637 (ALT 250 FOR IP): Performed by: PHYSICAL MEDICINE & REHABILITATION

## 2025-06-18 PROCEDURE — 97530 THERAPEUTIC ACTIVITIES: CPT

## 2025-06-18 PROCEDURE — 97533 SENSORY INTEGRATION: CPT

## 2025-06-18 PROCEDURE — 97032 APPL MODALITY 1+ESTIM EA 15: CPT

## 2025-06-18 PROCEDURE — 1180000000 HC REHAB R&B

## 2025-06-18 PROCEDURE — 97129 THER IVNTJ 1ST 15 MIN: CPT

## 2025-06-18 RX ADMIN — CLONIDINE HYDROCHLORIDE 0.1 MG: 0.1 TABLET ORAL at 20:29

## 2025-06-18 RX ADMIN — CLONIDINE HYDROCHLORIDE 0.1 MG: 0.1 TABLET ORAL at 15:17

## 2025-06-18 RX ADMIN — Medication 5 MG: at 20:29

## 2025-06-18 RX ADMIN — HYDRALAZINE HYDROCHLORIDE 25 MG: 25 TABLET, FILM COATED ORAL at 20:29

## 2025-06-18 RX ADMIN — ATORVASTATIN CALCIUM 80 MG: 80 TABLET, FILM COATED ORAL at 20:29

## 2025-06-18 RX ADMIN — SERTRALINE 25 MG: 25 TABLET, FILM COATED ORAL at 08:38

## 2025-06-18 RX ADMIN — Medication 2000 UNITS: at 18:07

## 2025-06-18 RX ADMIN — HYDRALAZINE HYDROCHLORIDE 25 MG: 25 TABLET, FILM COATED ORAL at 08:37

## 2025-06-18 RX ADMIN — ENOXAPARIN SODIUM 40 MG: 100 INJECTION SUBCUTANEOUS at 08:36

## 2025-06-18 RX ADMIN — CLOPIDOGREL BISULFATE 75 MG: 75 TABLET, FILM COATED ORAL at 08:38

## 2025-06-18 RX ADMIN — CLONIDINE HYDROCHLORIDE 0.1 MG: 0.1 TABLET ORAL at 08:38

## 2025-06-18 RX ADMIN — Medication 100 MG: at 08:36

## 2025-06-18 NOTE — PLAN OF CARE
Problem: Chronic Conditions and Co-morbidities  Goal: Patient's chronic conditions and co-morbidity symptoms are monitored and maintained or improved  6/18/2025 1335 by Tatynaa Cotto RN  Outcome: Progressing  Flowsheets (Taken 6/18/2025 1121)  Care Plan - Patient's Chronic Conditions and Co-Morbidity Symptoms are Monitored and Maintained or Improved: Monitor and assess patient's chronic conditions and comorbid symptoms for stability, deterioration, or improvement  6/18/2025 0048 by Daisy Barahona, RN  Outcome: Progressing     Problem: Discharge Planning  Goal: Discharge to home or other facility with appropriate resources  6/18/2025 1335 by Tatyana Cotto RN  Outcome: Progressing  Flowsheets (Taken 6/18/2025 1121)  Discharge to home or other facility with appropriate resources: Identify barriers to discharge with patient and caregiver  6/18/2025 0048 by Daisy Barahona, RN  Outcome: Progressing     Problem: Pain  Goal: Verbalizes/displays adequate comfort level or baseline comfort level  6/18/2025 1335 by Tatyana Cotto RN  Outcome: Progressing  6/18/2025 0048 by Daisy Barahona, RN  Outcome: Progressing

## 2025-06-18 NOTE — PLAN OF CARE
Problem: Chronic Conditions and Co-morbidities  Goal: Patient's chronic conditions and co-morbidity symptoms are monitored and maintained or improved  6/18/2025 0048 by Daisy Barahona RN  Outcome: Progressing     Problem: Discharge Planning  Goal: Discharge to home or other facility with appropriate resources  6/18/2025 0048 by Daisy Barahona RN  Outcome: Progressing     Problem: Pain  Goal: Verbalizes/displays adequate comfort level or baseline comfort level  6/18/2025 0048 by Daisy Barahona, RN  Outcome: Progressing

## 2025-06-19 LAB
ANION GAP SERPL CALCULATED.3IONS-SCNC: 12 MEQ/L (ref 9–15)
BASOPHILS # BLD: 0 K/UL (ref 0–0.2)
BASOPHILS NFR BLD: 0.3 %
BUN SERPL-MCNC: 12 MG/DL (ref 6–20)
CALCIUM SERPL-MCNC: 8.8 MG/DL (ref 8.5–9.9)
CHLORIDE SERPL-SCNC: 105 MEQ/L (ref 95–107)
CO2 SERPL-SCNC: 24 MEQ/L (ref 20–31)
CREAT SERPL-MCNC: 0.51 MG/DL (ref 0.5–0.9)
EOSINOPHIL # BLD: 0.2 K/UL (ref 0–0.7)
EOSINOPHIL NFR BLD: 2.8 %
ERYTHROCYTE [DISTWIDTH] IN BLOOD BY AUTOMATED COUNT: 12.6 % (ref 11.5–14.5)
GLUCOSE SERPL-MCNC: 116 MG/DL (ref 70–99)
HCT VFR BLD AUTO: 41.3 % (ref 37–47)
HGB BLD-MCNC: 14.1 G/DL (ref 12–16)
LYMPHOCYTES # BLD: 2.4 K/UL (ref 1–4.8)
LYMPHOCYTES NFR BLD: 28.2 %
MCH RBC QN AUTO: 30.5 PG (ref 27–31.3)
MCHC RBC AUTO-ENTMCNC: 34.1 % (ref 33–37)
MCV RBC AUTO: 89.2 FL (ref 79.4–94.8)
MONOCYTES # BLD: 0.8 K/UL (ref 0.2–0.8)
MONOCYTES NFR BLD: 9.5 %
NEUTROPHILS # BLD: 5.1 K/UL (ref 1.4–6.5)
NEUTS SEG NFR BLD: 58.7 %
PLATELET # BLD AUTO: 342 K/UL (ref 130–400)
POTASSIUM SERPL-SCNC: 4.1 MEQ/L (ref 3.4–4.9)
RBC # BLD AUTO: 4.63 M/UL (ref 4.2–5.4)
SODIUM SERPL-SCNC: 141 MEQ/L (ref 135–144)
WBC # BLD AUTO: 8.6 K/UL (ref 4.8–10.8)

## 2025-06-19 PROCEDURE — 99232 SBSQ HOSP IP/OBS MODERATE 35: CPT | Performed by: PHYSICAL MEDICINE & REHABILITATION

## 2025-06-19 PROCEDURE — 97533 SENSORY INTEGRATION: CPT

## 2025-06-19 PROCEDURE — 6360000002 HC RX W HCPCS: Performed by: INTERNAL MEDICINE

## 2025-06-19 PROCEDURE — 97032 APPL MODALITY 1+ESTIM EA 15: CPT

## 2025-06-19 PROCEDURE — 97112 NEUROMUSCULAR REEDUCATION: CPT

## 2025-06-19 PROCEDURE — 92507 TX SP LANG VOICE COMM INDIV: CPT

## 2025-06-19 PROCEDURE — 85025 COMPLETE CBC W/AUTO DIFF WBC: CPT

## 2025-06-19 PROCEDURE — 97542 WHEELCHAIR MNGMENT TRAINING: CPT

## 2025-06-19 PROCEDURE — 80048 BASIC METABOLIC PNL TOTAL CA: CPT

## 2025-06-19 PROCEDURE — 97129 THER IVNTJ 1ST 15 MIN: CPT

## 2025-06-19 PROCEDURE — 97110 THERAPEUTIC EXERCISES: CPT

## 2025-06-19 PROCEDURE — 1180000000 HC REHAB R&B

## 2025-06-19 PROCEDURE — 6370000000 HC RX 637 (ALT 250 FOR IP): Performed by: PHYSICAL MEDICINE & REHABILITATION

## 2025-06-19 PROCEDURE — 97116 GAIT TRAINING THERAPY: CPT

## 2025-06-19 PROCEDURE — 97535 SELF CARE MNGMENT TRAINING: CPT

## 2025-06-19 PROCEDURE — 6370000000 HC RX 637 (ALT 250 FOR IP): Performed by: INTERNAL MEDICINE

## 2025-06-19 PROCEDURE — 92526 ORAL FUNCTION THERAPY: CPT

## 2025-06-19 PROCEDURE — 36415 COLL VENOUS BLD VENIPUNCTURE: CPT

## 2025-06-19 PROCEDURE — 6370000000 HC RX 637 (ALT 250 FOR IP): Performed by: PSYCHIATRY & NEUROLOGY

## 2025-06-19 RX ADMIN — ATORVASTATIN CALCIUM 80 MG: 80 TABLET, FILM COATED ORAL at 20:38

## 2025-06-19 RX ADMIN — CLONIDINE HYDROCHLORIDE 0.1 MG: 0.1 TABLET ORAL at 09:23

## 2025-06-19 RX ADMIN — HYDRALAZINE HYDROCHLORIDE 25 MG: 25 TABLET, FILM COATED ORAL at 20:38

## 2025-06-19 RX ADMIN — CLONIDINE HYDROCHLORIDE 0.1 MG: 0.1 TABLET ORAL at 20:38

## 2025-06-19 RX ADMIN — Medication 100 MG: at 09:22

## 2025-06-19 RX ADMIN — ENOXAPARIN SODIUM 40 MG: 100 INJECTION SUBCUTANEOUS at 09:22

## 2025-06-19 RX ADMIN — SERTRALINE 25 MG: 25 TABLET, FILM COATED ORAL at 09:22

## 2025-06-19 RX ADMIN — Medication 2000 UNITS: at 17:01

## 2025-06-19 RX ADMIN — CLOPIDOGREL BISULFATE 75 MG: 75 TABLET, FILM COATED ORAL at 09:22

## 2025-06-19 RX ADMIN — CLONIDINE HYDROCHLORIDE 0.1 MG: 0.1 TABLET ORAL at 15:01

## 2025-06-19 RX ADMIN — Medication 5 MG: at 20:38

## 2025-06-19 RX ADMIN — HYDRALAZINE HYDROCHLORIDE 25 MG: 25 TABLET, FILM COATED ORAL at 09:22

## 2025-06-19 ASSESSMENT — PAIN SCALES - GENERAL: PAINLEVEL_OUTOF10: 0

## 2025-06-19 NOTE — PLAN OF CARE
Problem: Discharge Planning  Goal: Discharge to home or other facility with appropriate resources  6/19/2025 1334 by Tatyana Cotto, RN  Outcome: Progressing  6/18/2025 2347 by Daisy Barahona, RN  Outcome: Progressing     Problem: Pain  Goal: Verbalizes/displays adequate comfort level or baseline comfort level  6/19/2025 1334 by Tatyana Cotto, RN  Outcome: Progressing  6/18/2025 2347 by Daisy Barahona, RN  Outcome: Progressing

## 2025-06-19 NOTE — PLAN OF CARE
Problem: Chronic Conditions and Co-morbidities  Goal: Patient's chronic conditions and co-morbidity symptoms are monitored and maintained or improved  6/18/2025 2347 by Daisy Barahona, RN  Outcome: Progressing     Problem: Discharge Planning  Goal: Discharge to home or other facility with appropriate resources  6/18/2025 2347 by Daisy Barahona, RN  Outcome: Progressing     Problem: Pain  Goal: Verbalizes/displays adequate comfort level or baseline comfort level  6/18/2025 2347 by Daisy Barahona, RN  Outcome: Progressing

## 2025-06-20 PROCEDURE — 99232 SBSQ HOSP IP/OBS MODERATE 35: CPT | Performed by: PSYCHIATRY & NEUROLOGY

## 2025-06-20 PROCEDURE — 1180000000 HC REHAB R&B

## 2025-06-20 PROCEDURE — 92526 ORAL FUNCTION THERAPY: CPT

## 2025-06-20 PROCEDURE — 97542 WHEELCHAIR MNGMENT TRAINING: CPT

## 2025-06-20 PROCEDURE — 6370000000 HC RX 637 (ALT 250 FOR IP): Performed by: INTERNAL MEDICINE

## 2025-06-20 PROCEDURE — 97116 GAIT TRAINING THERAPY: CPT

## 2025-06-20 PROCEDURE — 6370000000 HC RX 637 (ALT 250 FOR IP): Performed by: PSYCHIATRY & NEUROLOGY

## 2025-06-20 PROCEDURE — 6370000000 HC RX 637 (ALT 250 FOR IP): Performed by: PHYSICAL MEDICINE & REHABILITATION

## 2025-06-20 PROCEDURE — 97032 APPL MODALITY 1+ESTIM EA 15: CPT

## 2025-06-20 PROCEDURE — 97129 THER IVNTJ 1ST 15 MIN: CPT

## 2025-06-20 PROCEDURE — APPSS15 APP SPLIT SHARED TIME 0-15 MINUTES: Performed by: NURSE PRACTITIONER

## 2025-06-20 PROCEDURE — 99232 SBSQ HOSP IP/OBS MODERATE 35: CPT | Performed by: PHYSICAL MEDICINE & REHABILITATION

## 2025-06-20 PROCEDURE — 97535 SELF CARE MNGMENT TRAINING: CPT

## 2025-06-20 PROCEDURE — 92507 TX SP LANG VOICE COMM INDIV: CPT

## 2025-06-20 PROCEDURE — 97112 NEUROMUSCULAR REEDUCATION: CPT

## 2025-06-20 PROCEDURE — 97530 THERAPEUTIC ACTIVITIES: CPT

## 2025-06-20 PROCEDURE — 97533 SENSORY INTEGRATION: CPT

## 2025-06-20 PROCEDURE — 97110 THERAPEUTIC EXERCISES: CPT

## 2025-06-20 PROCEDURE — 6360000002 HC RX W HCPCS: Performed by: INTERNAL MEDICINE

## 2025-06-20 RX ADMIN — ATORVASTATIN CALCIUM 80 MG: 80 TABLET, FILM COATED ORAL at 20:48

## 2025-06-20 RX ADMIN — CLONIDINE HYDROCHLORIDE 0.1 MG: 0.1 TABLET ORAL at 14:52

## 2025-06-20 RX ADMIN — Medication 5 MG: at 20:48

## 2025-06-20 RX ADMIN — ENOXAPARIN SODIUM 40 MG: 100 INJECTION SUBCUTANEOUS at 12:03

## 2025-06-20 RX ADMIN — Medication 100 MG: at 12:04

## 2025-06-20 RX ADMIN — CLOPIDOGREL BISULFATE 75 MG: 75 TABLET, FILM COATED ORAL at 12:05

## 2025-06-20 RX ADMIN — HYDRALAZINE HYDROCHLORIDE 25 MG: 25 TABLET, FILM COATED ORAL at 20:48

## 2025-06-20 RX ADMIN — HYDRALAZINE HYDROCHLORIDE 25 MG: 25 TABLET, FILM COATED ORAL at 12:04

## 2025-06-20 RX ADMIN — CLONIDINE HYDROCHLORIDE 0.1 MG: 0.1 TABLET ORAL at 20:48

## 2025-06-20 RX ADMIN — Medication 2000 UNITS: at 16:38

## 2025-06-20 RX ADMIN — SERTRALINE 25 MG: 25 TABLET, FILM COATED ORAL at 12:04

## 2025-06-20 RX ADMIN — CLONIDINE HYDROCHLORIDE 0.1 MG: 0.1 TABLET ORAL at 12:04

## 2025-06-20 NOTE — PLAN OF CARE
Problem: Chronic Conditions and Co-morbidities  Goal: Patient's chronic conditions and co-morbidity symptoms are monitored and maintained or improved  6/20/2025 0054 by Daisy Barahona RN  Outcome: Progressing     Problem: Discharge Planning  Goal: Discharge to home or other facility with appropriate resources  6/20/2025 0054 by Daisy Barahona RN  Outcome: Progressing     Problem: Pain  Goal: Verbalizes/displays adequate comfort level or baseline comfort level  6/20/2025 0054 by Daisy Barahona RN  Outcome: Progressing     Problem: Safety - Adult  Goal: Free from fall injury  6/20/2025 0054 by Daisy Barahona RN  Outcome: Progressing

## 2025-06-20 NOTE — PLAN OF CARE
Problem: Chronic Conditions and Co-morbidities  Goal: Patient's chronic conditions and co-morbidity symptoms are monitored and maintained or improved  6/20/2025 0915 by Marika Rose RN  Outcome: Progressing     Problem: Discharge Planning  Goal: Discharge to home or other facility with appropriate resources  6/20/2025 0915 by Marika Rose RN  Outcome: Progressing     Problem: Pain  Goal: Verbalizes/displays adequate comfort level or baseline comfort level  6/20/2025 0915 by Marika Rose RN  Outcome: Progressing     Problem: Safety - Adult  Goal: Free from fall injury  6/20/2025 0915 by Marika Rose RN  Outcome: Progressing     Problem: Skin/Tissue Integrity  Goal: Skin integrity remains intact  Description: 1.  Monitor for areas of redness and/or skin breakdown  2.  Assess vascular access sites hourly  3.  Every 4-6 hours minimum:  Change oxygen saturation probe site  4.  Every 4-6 hours:  If on nasal continuous positive airway pressure, respiratory therapy assess nares and determine need for appliance change or resting period  Outcome: Progressing

## 2025-06-21 PROCEDURE — 6360000002 HC RX W HCPCS: Performed by: INTERNAL MEDICINE

## 2025-06-21 PROCEDURE — 6370000000 HC RX 637 (ALT 250 FOR IP): Performed by: PSYCHIATRY & NEUROLOGY

## 2025-06-21 PROCEDURE — 97110 THERAPEUTIC EXERCISES: CPT

## 2025-06-21 PROCEDURE — 92507 TX SP LANG VOICE COMM INDIV: CPT

## 2025-06-21 PROCEDURE — 6370000000 HC RX 637 (ALT 250 FOR IP): Performed by: PHYSICAL MEDICINE & REHABILITATION

## 2025-06-21 PROCEDURE — 1180000000 HC REHAB R&B

## 2025-06-21 PROCEDURE — 6370000000 HC RX 637 (ALT 250 FOR IP): Performed by: INTERNAL MEDICINE

## 2025-06-21 RX ADMIN — ENOXAPARIN SODIUM 40 MG: 100 INJECTION SUBCUTANEOUS at 07:57

## 2025-06-21 RX ADMIN — HYDRALAZINE HYDROCHLORIDE 25 MG: 25 TABLET, FILM COATED ORAL at 19:40

## 2025-06-21 RX ADMIN — Medication 100 MG: at 07:57

## 2025-06-21 RX ADMIN — CLONIDINE HYDROCHLORIDE 0.1 MG: 0.1 TABLET ORAL at 19:40

## 2025-06-21 RX ADMIN — Medication 5 MG: at 19:40

## 2025-06-21 RX ADMIN — CLONIDINE HYDROCHLORIDE 0.1 MG: 0.1 TABLET ORAL at 07:57

## 2025-06-21 RX ADMIN — ACETAMINOPHEN 650 MG: 325 TABLET ORAL at 13:14

## 2025-06-21 RX ADMIN — Medication 2000 UNITS: at 16:05

## 2025-06-21 RX ADMIN — SERTRALINE 25 MG: 25 TABLET, FILM COATED ORAL at 07:57

## 2025-06-21 RX ADMIN — CLONIDINE HYDROCHLORIDE 0.1 MG: 0.1 TABLET ORAL at 13:02

## 2025-06-21 RX ADMIN — ATORVASTATIN CALCIUM 80 MG: 80 TABLET, FILM COATED ORAL at 19:40

## 2025-06-21 RX ADMIN — CLOPIDOGREL BISULFATE 75 MG: 75 TABLET, FILM COATED ORAL at 07:57

## 2025-06-21 RX ADMIN — HYDRALAZINE HYDROCHLORIDE 25 MG: 25 TABLET, FILM COATED ORAL at 07:57

## 2025-06-21 ASSESSMENT — PAIN SCALES - GENERAL
PAINLEVEL_OUTOF10: 3
PAINLEVEL_OUTOF10: 6

## 2025-06-21 NOTE — FLOWSHEET NOTE
0849: Patient assessments completed, patient alert and oriented times 4, continues to have left sided weakness, with minimal facial droop.  Complies with all therapies. Call light in reach, will continue to monitor throughout this shift. .Electronically signed by Kamryn Sharp RN on 6/21/2025 at 8:52 AM

## 2025-06-21 NOTE — PLAN OF CARE
Problem: Chronic Conditions and Co-morbidities  Goal: Patient's chronic conditions and co-morbidity symptoms are monitored and maintained or improved  Outcome: Progressing     Problem: Discharge Planning  Goal: Discharge to home or other facility with appropriate resources  6/21/2025 0848 by Kamryn Sharp RN  Outcome: Progressing  6/21/2025 0013 by Kimberly Couch RN  Outcome: Progressing     Problem: Pain  Goal: Verbalizes/displays adequate comfort level or baseline comfort level  6/21/2025 0848 by Kamryn Sharp RN  Outcome: Progressing  6/21/2025 0013 by Kimberly Couch RN  Outcome: Progressing     Problem: Safety - Adult  Goal: Free from fall injury  6/21/2025 0848 by Kamryn Sharp RN  Outcome: Progressing  6/21/2025 0013 by Kimberly Couch RN  Outcome: Progressing     Problem: Skin/Tissue Integrity  Goal: Skin integrity remains intact  Description: 1.  Monitor for areas of redness and/or skin breakdown  2.  Assess vascular access sites hourly  3.  Every 4-6 hours minimum:  Change oxygen saturation probe site  4.  Every 4-6 hours:  If on nasal continuous positive airway pressure, respiratory therapy assess nares and determine need for appliance change or resting period  Outcome: Progressing

## 2025-06-22 PROCEDURE — 6370000000 HC RX 637 (ALT 250 FOR IP): Performed by: PHYSICAL MEDICINE & REHABILITATION

## 2025-06-22 PROCEDURE — 6360000002 HC RX W HCPCS: Performed by: INTERNAL MEDICINE

## 2025-06-22 PROCEDURE — 6370000000 HC RX 637 (ALT 250 FOR IP): Performed by: PSYCHIATRY & NEUROLOGY

## 2025-06-22 PROCEDURE — 92526 ORAL FUNCTION THERAPY: CPT

## 2025-06-22 PROCEDURE — 6370000000 HC RX 637 (ALT 250 FOR IP): Performed by: INTERNAL MEDICINE

## 2025-06-22 PROCEDURE — 97032 APPL MODALITY 1+ESTIM EA 15: CPT

## 2025-06-22 PROCEDURE — 1180000000 HC REHAB R&B

## 2025-06-22 RX ORDER — CLONIDINE HYDROCHLORIDE 0.2 MG/1
0.2 TABLET ORAL 3 TIMES DAILY
Status: DISCONTINUED | OUTPATIENT
Start: 2025-06-22 | End: 2025-07-16 | Stop reason: HOSPADM

## 2025-06-22 RX ADMIN — ENOXAPARIN SODIUM 40 MG: 100 INJECTION SUBCUTANEOUS at 09:24

## 2025-06-22 RX ADMIN — ATORVASTATIN CALCIUM 80 MG: 80 TABLET, FILM COATED ORAL at 21:13

## 2025-06-22 RX ADMIN — CLONIDINE HYDROCHLORIDE 0.2 MG: 0.2 TABLET ORAL at 14:31

## 2025-06-22 RX ADMIN — Medication 5 MG: at 21:13

## 2025-06-22 RX ADMIN — CLONIDINE HYDROCHLORIDE 0.1 MG: 0.1 TABLET ORAL at 09:25

## 2025-06-22 RX ADMIN — Medication 2000 UNITS: at 17:09

## 2025-06-22 RX ADMIN — Medication 100 MG: at 09:25

## 2025-06-22 RX ADMIN — CLOPIDOGREL BISULFATE 75 MG: 75 TABLET, FILM COATED ORAL at 09:25

## 2025-06-22 RX ADMIN — HYDRALAZINE HYDROCHLORIDE 25 MG: 25 TABLET, FILM COATED ORAL at 09:25

## 2025-06-22 RX ADMIN — SERTRALINE 25 MG: 25 TABLET, FILM COATED ORAL at 09:25

## 2025-06-22 RX ADMIN — CLONIDINE HYDROCHLORIDE 0.2 MG: 0.2 TABLET ORAL at 21:13

## 2025-06-22 RX ADMIN — HYDRALAZINE HYDROCHLORIDE 25 MG: 25 TABLET, FILM COATED ORAL at 21:14

## 2025-06-22 ASSESSMENT — PAIN SCALES - GENERAL: PAINLEVEL_OUTOF10: 0

## 2025-06-22 NOTE — PLAN OF CARE
Problem: Chronic Conditions and Co-morbidities  Goal: Patient's chronic conditions and co-morbidity symptoms are monitored and maintained or improved  6/21/2025 2046 by Kamryn Sharp RN  Outcome: Progressing  6/21/2025 0848 by Kamryn Sharp RN  Outcome: Progressing     Problem: Discharge Planning  Goal: Discharge to home or other facility with appropriate resources  6/21/2025 2046 by Kamryn Sharp RN  Outcome: Progressing  6/21/2025 0848 by Kamryn Sharp RN  Outcome: Progressing     Problem: Pain  Goal: Verbalizes/displays adequate comfort level or baseline comfort level  6/21/2025 2046 by Kamryn Sharp RN  Outcome: Progressing  6/21/2025 0848 by Kamryn Sharp RN  Outcome: Progressing     Problem: Safety - Adult  Goal: Free from fall injury  6/21/2025 2046 by Kamryn Sharp RN  Outcome: Progressing  6/21/2025 0848 by Kamryn Sharp RN  Outcome: Progressing     Problem: Skin/Tissue Integrity  Goal: Skin integrity remains intact  Description: 1.  Monitor for areas of redness and/or skin breakdown  2.  Assess vascular access sites hourly  3.  Every 4-6 hours minimum:  Change oxygen saturation probe site  4.  Every 4-6 hours:  If on nasal continuous positive airway pressure, respiratory therapy assess nares and determine need for appliance change or resting period  6/21/2025 2046 by Kamryn Sharp RN  Outcome: Progressing  6/21/2025 0848 by Kamryn Sharp RN  Outcome: Progressing

## 2025-06-23 LAB
ANION GAP SERPL CALCULATED.3IONS-SCNC: 11 MEQ/L (ref 9–15)
BASOPHILS # BLD: 0 K/UL (ref 0–0.2)
BASOPHILS NFR BLD: 0.3 %
BUN SERPL-MCNC: 17 MG/DL (ref 6–20)
CALCIUM SERPL-MCNC: 8.9 MG/DL (ref 8.5–9.9)
CHLORIDE SERPL-SCNC: 103 MEQ/L (ref 95–107)
CO2 SERPL-SCNC: 23 MEQ/L (ref 20–31)
CREAT SERPL-MCNC: 0.52 MG/DL (ref 0.5–0.9)
EOSINOPHIL # BLD: 0.3 K/UL (ref 0–0.7)
EOSINOPHIL NFR BLD: 2.7 %
ERYTHROCYTE [DISTWIDTH] IN BLOOD BY AUTOMATED COUNT: 12.3 % (ref 11.5–14.5)
GLUCOSE BLD-MCNC: 105 MG/DL (ref 70–99)
GLUCOSE SERPL-MCNC: 115 MG/DL (ref 70–99)
HCT VFR BLD AUTO: 42.4 % (ref 37–47)
HGB BLD-MCNC: 14.4 G/DL (ref 12–16)
LYMPHOCYTES # BLD: 2.2 K/UL (ref 1–4.8)
LYMPHOCYTES NFR BLD: 24.2 %
MCH RBC QN AUTO: 30.5 PG (ref 27–31.3)
MCHC RBC AUTO-ENTMCNC: 34 % (ref 33–37)
MCV RBC AUTO: 89.8 FL (ref 79.4–94.8)
MONOCYTES # BLD: 1 K/UL (ref 0.2–0.8)
MONOCYTES NFR BLD: 10.6 %
NEUTROPHILS # BLD: 5.7 K/UL (ref 1.4–6.5)
NEUTS SEG NFR BLD: 61.7 %
PERFORMED ON: ABNORMAL
PLATELET # BLD AUTO: 393 K/UL (ref 130–400)
POTASSIUM SERPL-SCNC: 4.1 MEQ/L (ref 3.4–4.9)
RBC # BLD AUTO: 4.72 M/UL (ref 4.2–5.4)
SODIUM SERPL-SCNC: 137 MEQ/L (ref 135–144)
WBC # BLD AUTO: 9.3 K/UL (ref 4.8–10.8)

## 2025-06-23 PROCEDURE — 97535 SELF CARE MNGMENT TRAINING: CPT

## 2025-06-23 PROCEDURE — 6370000000 HC RX 637 (ALT 250 FOR IP): Performed by: INTERNAL MEDICINE

## 2025-06-23 PROCEDURE — 6360000002 HC RX W HCPCS: Performed by: INTERNAL MEDICINE

## 2025-06-23 PROCEDURE — 36415 COLL VENOUS BLD VENIPUNCTURE: CPT

## 2025-06-23 PROCEDURE — 97129 THER IVNTJ 1ST 15 MIN: CPT

## 2025-06-23 PROCEDURE — 99231 SBSQ HOSP IP/OBS SF/LOW 25: CPT | Performed by: NURSE PRACTITIONER

## 2025-06-23 PROCEDURE — 99233 SBSQ HOSP IP/OBS HIGH 50: CPT | Performed by: PHYSICAL MEDICINE & REHABILITATION

## 2025-06-23 PROCEDURE — 85025 COMPLETE CBC W/AUTO DIFF WBC: CPT

## 2025-06-23 PROCEDURE — 6370000000 HC RX 637 (ALT 250 FOR IP): Performed by: PHYSICAL MEDICINE & REHABILITATION

## 2025-06-23 PROCEDURE — 97116 GAIT TRAINING THERAPY: CPT

## 2025-06-23 PROCEDURE — 92526 ORAL FUNCTION THERAPY: CPT

## 2025-06-23 PROCEDURE — 1180000000 HC REHAB R&B

## 2025-06-23 PROCEDURE — 80048 BASIC METABOLIC PNL TOTAL CA: CPT

## 2025-06-23 PROCEDURE — 97032 APPL MODALITY 1+ESTIM EA 15: CPT

## 2025-06-23 PROCEDURE — 97530 THERAPEUTIC ACTIVITIES: CPT

## 2025-06-23 PROCEDURE — 6360000002 HC RX W HCPCS: Performed by: PHYSICAL MEDICINE & REHABILITATION

## 2025-06-23 PROCEDURE — 92507 TX SP LANG VOICE COMM INDIV: CPT

## 2025-06-23 PROCEDURE — 6370000000 HC RX 637 (ALT 250 FOR IP): Performed by: PSYCHIATRY & NEUROLOGY

## 2025-06-23 PROCEDURE — 97542 WHEELCHAIR MNGMENT TRAINING: CPT

## 2025-06-23 PROCEDURE — 97110 THERAPEUTIC EXERCISES: CPT

## 2025-06-23 RX ORDER — OXYCODONE HYDROCHLORIDE 5 MG/1
5 TABLET ORAL EVERY 4 HOURS PRN
Refills: 0 | Status: DISCONTINUED | OUTPATIENT
Start: 2025-06-23 | End: 2025-06-28

## 2025-06-23 RX ORDER — BUTALBITAL, ACETAMINOPHEN AND CAFFEINE 300; 40; 50 MG/1; MG/1; MG/1
1 CAPSULE ORAL EVERY 4 HOURS PRN
Status: DISCONTINUED | OUTPATIENT
Start: 2025-06-23 | End: 2025-07-16 | Stop reason: HOSPADM

## 2025-06-23 RX ORDER — OXYMETAZOLINE HYDROCHLORIDE 0.05 G/100ML
2 SPRAY NASAL 2 TIMES DAILY
Status: COMPLETED | OUTPATIENT
Start: 2025-06-23 | End: 2025-06-26

## 2025-06-23 RX ADMIN — Medication 2 SPRAY: at 20:29

## 2025-06-23 RX ADMIN — CLONIDINE HYDROCHLORIDE 0.2 MG: 0.2 TABLET ORAL at 15:00

## 2025-06-23 RX ADMIN — CLONIDINE HYDROCHLORIDE 0.2 MG: 0.2 TABLET ORAL at 20:23

## 2025-06-23 RX ADMIN — ATORVASTATIN CALCIUM 80 MG: 80 TABLET, FILM COATED ORAL at 20:23

## 2025-06-23 RX ADMIN — Medication 5 MG: at 20:23

## 2025-06-23 RX ADMIN — HYDRALAZINE HYDROCHLORIDE 25 MG: 25 TABLET, FILM COATED ORAL at 20:23

## 2025-06-23 RX ADMIN — ACETAMINOPHEN 650 MG: 325 TABLET ORAL at 07:55

## 2025-06-23 RX ADMIN — Medication 2000 UNITS: at 16:40

## 2025-06-23 RX ADMIN — OXYCODONE 5 MG: 5 TABLET ORAL at 15:00

## 2025-06-23 RX ADMIN — CLOPIDOGREL BISULFATE 75 MG: 75 TABLET, FILM COATED ORAL at 07:51

## 2025-06-23 RX ADMIN — CYANOCOBALAMIN 1000 MCG: 1000 INJECTION, SOLUTION INTRAMUSCULAR; SUBCUTANEOUS at 07:52

## 2025-06-23 RX ADMIN — ENOXAPARIN SODIUM 40 MG: 100 INJECTION SUBCUTANEOUS at 07:51

## 2025-06-23 RX ADMIN — SERTRALINE 25 MG: 25 TABLET, FILM COATED ORAL at 07:51

## 2025-06-23 RX ADMIN — CLONIDINE HYDROCHLORIDE 0.2 MG: 0.2 TABLET ORAL at 07:51

## 2025-06-23 RX ADMIN — Medication 100 MG: at 07:50

## 2025-06-23 RX ADMIN — HYDRALAZINE HYDROCHLORIDE 25 MG: 25 TABLET, FILM COATED ORAL at 07:51

## 2025-06-23 ASSESSMENT — PAIN SCALES - GENERAL
PAINLEVEL_OUTOF10: 8
PAINLEVEL_OUTOF10: 5
PAINLEVEL_OUTOF10: 0
PAINLEVEL_OUTOF10: 5
PAINLEVEL_OUTOF10: 4

## 2025-06-23 ASSESSMENT — PAIN DESCRIPTION - LOCATION
LOCATION: HEAD
LOCATION: BUTTOCKS

## 2025-06-23 ASSESSMENT — PAIN DESCRIPTION - DESCRIPTORS
DESCRIPTORS: ACHING;SORE
DESCRIPTORS: ACHING

## 2025-06-23 ASSESSMENT — PAIN DESCRIPTION - ORIENTATION: ORIENTATION: RIGHT;LEFT

## 2025-06-23 ASSESSMENT — PAIN - FUNCTIONAL ASSESSMENT: PAIN_FUNCTIONAL_ASSESSMENT: PREVENTS OR INTERFERES SOME ACTIVE ACTIVITIES AND ADLS

## 2025-06-23 NOTE — PLAN OF CARE
Problem: Chronic Conditions and Co-morbidities  Goal: Patient's chronic conditions and co-morbidity symptoms are monitored and maintained or improved  6/23/2025 0941 by Kamryn Sharp RN  Outcome: Progressing  6/22/2025 2355 by Daisy Barahona RN  Outcome: Progressing     Problem: Discharge Planning  Goal: Discharge to home or other facility with appropriate resources  6/23/2025 0941 by Kamryn Sharp RN  Outcome: Progressing  6/22/2025 2355 by Daisy Barahona RN  Outcome: Progressing     Problem: Pain  Goal: Verbalizes/displays adequate comfort level or baseline comfort level  6/23/2025 0941 by Kamryn Sharp RN  Outcome: Progressing  6/22/2025 2355 by Daisy Barahona RN  Outcome: Progressing     Problem: Safety - Adult  Goal: Free from fall injury  Outcome: Progressing     Problem: Skin/Tissue Integrity  Goal: Skin integrity remains intact  Description: 1.  Monitor for areas of redness and/or skin breakdown  2.  Assess vascular access sites hourly  3.  Every 4-6 hours minimum:  Change oxygen saturation probe site  4.  Every 4-6 hours:  If on nasal continuous positive airway pressure, respiratory therapy assess nares and determine need for appliance change or resting period  Outcome: Progressing

## 2025-06-23 NOTE — PLAN OF CARE
Problem: Chronic Conditions and Co-morbidities  Goal: Patient's chronic conditions and co-morbidity symptoms are monitored and maintained or improved  6/22/2025 2355 by Daisy Barahona RN  Outcome: Progressing     Problem: Discharge Planning  Goal: Discharge to home or other facility with appropriate resources  6/22/2025 2355 by Daisy Barahona, RN  Outcome: Progressing     Problem: Pain  Goal: Verbalizes/displays adequate comfort level or baseline comfort level  6/22/2025 2355 by Daisy Barahona, RN  Outcome: Progressing

## 2025-06-23 NOTE — FLOWSHEET NOTE
0942: Patient assessments completed, patient alert and oriented times 4, able to make all needs known, complies with all therapies. Patient c/o headache, pain controlled with as needed tylenol, er Dr Ramirez, additional prn medications added if needed. Call light is in reach. Will continue to monitor throughout this shift.Electronically signed by Kamryn Sharp RN on 6/23/2025 at 9:44 AM

## 2025-06-24 PROBLEM — B37.0 ORAL THRUSH: Status: ACTIVE | Noted: 2025-06-24

## 2025-06-24 LAB — STREP GRP A PCR: NEGATIVE

## 2025-06-24 PROCEDURE — 97130 THER IVNTJ EA ADDL 15 MIN: CPT

## 2025-06-24 PROCEDURE — 6370000000 HC RX 637 (ALT 250 FOR IP): Performed by: PSYCHIATRY & NEUROLOGY

## 2025-06-24 PROCEDURE — 6370000000 HC RX 637 (ALT 250 FOR IP): Performed by: INTERNAL MEDICINE

## 2025-06-24 PROCEDURE — 6370000000 HC RX 637 (ALT 250 FOR IP): Performed by: PHYSICAL MEDICINE & REHABILITATION

## 2025-06-24 PROCEDURE — 97535 SELF CARE MNGMENT TRAINING: CPT

## 2025-06-24 PROCEDURE — 1180000000 HC REHAB R&B

## 2025-06-24 PROCEDURE — 6360000002 HC RX W HCPCS: Performed by: INTERNAL MEDICINE

## 2025-06-24 PROCEDURE — 92526 ORAL FUNCTION THERAPY: CPT

## 2025-06-24 PROCEDURE — 97032 APPL MODALITY 1+ESTIM EA 15: CPT

## 2025-06-24 PROCEDURE — 99232 SBSQ HOSP IP/OBS MODERATE 35: CPT | Performed by: PHYSICAL MEDICINE & REHABILITATION

## 2025-06-24 PROCEDURE — 97112 NEUROMUSCULAR REEDUCATION: CPT

## 2025-06-24 PROCEDURE — 97530 THERAPEUTIC ACTIVITIES: CPT

## 2025-06-24 PROCEDURE — 97542 WHEELCHAIR MNGMENT TRAINING: CPT

## 2025-06-24 PROCEDURE — 97110 THERAPEUTIC EXERCISES: CPT

## 2025-06-24 PROCEDURE — 87651 STREP A DNA AMP PROBE: CPT

## 2025-06-24 PROCEDURE — 97129 THER IVNTJ 1ST 15 MIN: CPT

## 2025-06-24 RX ORDER — NYSTATIN 100000 [USP'U]/ML
5 SUSPENSION ORAL 4 TIMES DAILY
Status: DISCONTINUED | OUTPATIENT
Start: 2025-06-24 | End: 2025-07-16 | Stop reason: HOSPADM

## 2025-06-24 RX ORDER — FLUCONAZOLE 100 MG/1
100 TABLET ORAL DAILY
Status: COMPLETED | OUTPATIENT
Start: 2025-06-24 | End: 2025-06-26

## 2025-06-24 RX ADMIN — SERTRALINE 25 MG: 25 TABLET, FILM COATED ORAL at 09:20

## 2025-06-24 RX ADMIN — CLONIDINE HYDROCHLORIDE 0.2 MG: 0.2 TABLET ORAL at 21:11

## 2025-06-24 RX ADMIN — OXYCODONE 5 MG: 5 TABLET ORAL at 21:13

## 2025-06-24 RX ADMIN — NYSTATIN 500000 UNITS: 100000 SUSPENSION ORAL at 21:11

## 2025-06-24 RX ADMIN — Medication 100 MG: at 09:19

## 2025-06-24 RX ADMIN — ENOXAPARIN SODIUM 40 MG: 100 INJECTION SUBCUTANEOUS at 09:19

## 2025-06-24 RX ADMIN — HYDRALAZINE HYDROCHLORIDE 25 MG: 25 TABLET, FILM COATED ORAL at 09:20

## 2025-06-24 RX ADMIN — HYDRALAZINE HYDROCHLORIDE 25 MG: 25 TABLET, FILM COATED ORAL at 21:11

## 2025-06-24 RX ADMIN — Medication 2 SPRAY: at 09:21

## 2025-06-24 RX ADMIN — ATORVASTATIN CALCIUM 80 MG: 80 TABLET, FILM COATED ORAL at 21:11

## 2025-06-24 RX ADMIN — FLUCONAZOLE 100 MG: 100 TABLET ORAL at 16:15

## 2025-06-24 RX ADMIN — CLOPIDOGREL BISULFATE 75 MG: 75 TABLET, FILM COATED ORAL at 09:19

## 2025-06-24 RX ADMIN — NYSTATIN 500000 UNITS: 100000 SUSPENSION ORAL at 16:15

## 2025-06-24 RX ADMIN — Medication 5 MG: at 21:11

## 2025-06-24 RX ADMIN — Medication 2 SPRAY: at 21:14

## 2025-06-24 RX ADMIN — CLONIDINE HYDROCHLORIDE 0.2 MG: 0.2 TABLET ORAL at 09:19

## 2025-06-24 RX ADMIN — CLONIDINE HYDROCHLORIDE 0.2 MG: 0.2 TABLET ORAL at 12:24

## 2025-06-24 RX ADMIN — NYSTATIN 500000 UNITS: 100000 SUSPENSION ORAL at 12:24

## 2025-06-24 RX ADMIN — Medication 2000 UNITS: at 16:15

## 2025-06-24 ASSESSMENT — PAIN SCALES - GENERAL
PAINLEVEL_OUTOF10: 0
PAINLEVEL_OUTOF10: 0
PAINLEVEL_OUTOF10: 8
PAINLEVEL_OUTOF10: 0

## 2025-06-24 ASSESSMENT — PAIN DESCRIPTION - LOCATION: LOCATION: BUTTOCKS

## 2025-06-24 ASSESSMENT — PAIN DESCRIPTION - ORIENTATION: ORIENTATION: RIGHT;LEFT

## 2025-06-25 LAB
BACTERIA URNS QL MICRO: ABNORMAL /HPF
BILIRUB UR QL STRIP: NEGATIVE
CLARITY UR: CLEAR
COLOR UR: YELLOW
EPI CELLS #/AREA URNS AUTO: ABNORMAL /HPF (ref 0–5)
GLUCOSE UR STRIP-MCNC: NEGATIVE MG/DL
HGB UR QL STRIP: NEGATIVE
HYALINE CASTS #/AREA URNS AUTO: ABNORMAL /HPF (ref 0–5)
KETONES UR STRIP-MCNC: NEGATIVE MG/DL
LEUKOCYTE ESTERASE UR QL STRIP: ABNORMAL
NITRITE UR QL STRIP: NEGATIVE
PH UR STRIP: 6.5 [PH] (ref 5–9)
PROT UR STRIP-MCNC: NEGATIVE MG/DL
RBC #/AREA URNS AUTO: ABNORMAL /HPF (ref 0–5)
SP GR UR STRIP: 1.01 (ref 1–1.03)
URINE REFLEX TO CULTURE: ABNORMAL
UROBILINOGEN UR STRIP-ACNC: 1 E.U./DL
WBC #/AREA URNS AUTO: ABNORMAL /HPF (ref 0–5)

## 2025-06-25 PROCEDURE — 1180000000 HC REHAB R&B

## 2025-06-25 PROCEDURE — 92507 TX SP LANG VOICE COMM INDIV: CPT

## 2025-06-25 PROCEDURE — 97032 APPL MODALITY 1+ESTIM EA 15: CPT

## 2025-06-25 PROCEDURE — 97116 GAIT TRAINING THERAPY: CPT

## 2025-06-25 PROCEDURE — 97535 SELF CARE MNGMENT TRAINING: CPT

## 2025-06-25 PROCEDURE — 6370000000 HC RX 637 (ALT 250 FOR IP): Performed by: PHYSICAL MEDICINE & REHABILITATION

## 2025-06-25 PROCEDURE — 97112 NEUROMUSCULAR REEDUCATION: CPT

## 2025-06-25 PROCEDURE — 81001 URINALYSIS AUTO W/SCOPE: CPT

## 2025-06-25 PROCEDURE — 97110 THERAPEUTIC EXERCISES: CPT

## 2025-06-25 PROCEDURE — 6370000000 HC RX 637 (ALT 250 FOR IP): Performed by: INTERNAL MEDICINE

## 2025-06-25 PROCEDURE — 99231 SBSQ HOSP IP/OBS SF/LOW 25: CPT | Performed by: NURSE PRACTITIONER

## 2025-06-25 PROCEDURE — 97129 THER IVNTJ 1ST 15 MIN: CPT

## 2025-06-25 PROCEDURE — 92526 ORAL FUNCTION THERAPY: CPT

## 2025-06-25 PROCEDURE — 97542 WHEELCHAIR MNGMENT TRAINING: CPT

## 2025-06-25 PROCEDURE — 99232 SBSQ HOSP IP/OBS MODERATE 35: CPT | Performed by: PHYSICAL MEDICINE & REHABILITATION

## 2025-06-25 PROCEDURE — 6370000000 HC RX 637 (ALT 250 FOR IP): Performed by: PSYCHIATRY & NEUROLOGY

## 2025-06-25 PROCEDURE — 97533 SENSORY INTEGRATION: CPT

## 2025-06-25 PROCEDURE — 6360000002 HC RX W HCPCS: Performed by: INTERNAL MEDICINE

## 2025-06-25 RX ADMIN — Medication 100 MG: at 09:12

## 2025-06-25 RX ADMIN — Medication 5 MG: at 21:39

## 2025-06-25 RX ADMIN — ATORVASTATIN CALCIUM 80 MG: 80 TABLET, FILM COATED ORAL at 21:39

## 2025-06-25 RX ADMIN — CLONIDINE HYDROCHLORIDE 0.2 MG: 0.2 TABLET ORAL at 21:39

## 2025-06-25 RX ADMIN — NYSTATIN 500000 UNITS: 100000 SUSPENSION ORAL at 09:12

## 2025-06-25 RX ADMIN — HYDRALAZINE HYDROCHLORIDE 25 MG: 25 TABLET, FILM COATED ORAL at 21:39

## 2025-06-25 RX ADMIN — NYSTATIN 500000 UNITS: 100000 SUSPENSION ORAL at 17:10

## 2025-06-25 RX ADMIN — FLUCONAZOLE 100 MG: 100 TABLET ORAL at 09:12

## 2025-06-25 RX ADMIN — ENOXAPARIN SODIUM 40 MG: 100 INJECTION SUBCUTANEOUS at 09:12

## 2025-06-25 RX ADMIN — HYDRALAZINE HYDROCHLORIDE 25 MG: 25 TABLET, FILM COATED ORAL at 09:12

## 2025-06-25 RX ADMIN — Medication 2000 UNITS: at 17:10

## 2025-06-25 RX ADMIN — CLOPIDOGREL BISULFATE 75 MG: 75 TABLET, FILM COATED ORAL at 09:12

## 2025-06-25 RX ADMIN — NYSTATIN 500000 UNITS: 100000 SUSPENSION ORAL at 21:39

## 2025-06-25 RX ADMIN — Medication 2 SPRAY: at 09:20

## 2025-06-25 RX ADMIN — CLONIDINE HYDROCHLORIDE 0.2 MG: 0.2 TABLET ORAL at 09:11

## 2025-06-25 RX ADMIN — Medication 2 SPRAY: at 21:39

## 2025-06-25 RX ADMIN — CLONIDINE HYDROCHLORIDE 0.2 MG: 0.2 TABLET ORAL at 17:10

## 2025-06-25 RX ADMIN — SERTRALINE 25 MG: 25 TABLET, FILM COATED ORAL at 09:12

## 2025-06-25 ASSESSMENT — PAIN SCALES - GENERAL: PAINLEVEL_OUTOF10: 0

## 2025-06-25 NOTE — PLAN OF CARE
Problem: Chronic Conditions and Co-morbidities  Goal: Patient's chronic conditions and co-morbidity symptoms are monitored and maintained or improved  6/25/2025 0016 by Daisy Barahona RN  Outcome: Progressing     Problem: Discharge Planning  Goal: Discharge to home or other facility with appropriate resources  6/25/2025 0016 by Daisy Barahona, RN  Outcome: Progressing

## 2025-06-26 LAB
ANION GAP SERPL CALCULATED.3IONS-SCNC: 11 MEQ/L (ref 9–15)
BASOPHILS # BLD: 0.1 K/UL (ref 0–0.2)
BASOPHILS NFR BLD: 0.5 %
BUN SERPL-MCNC: 12 MG/DL (ref 6–20)
CALCIUM SERPL-MCNC: 9.2 MG/DL (ref 8.5–9.9)
CHLORIDE SERPL-SCNC: 100 MEQ/L (ref 95–107)
CO2 SERPL-SCNC: 26 MEQ/L (ref 20–31)
CREAT SERPL-MCNC: 0.59 MG/DL (ref 0.5–0.9)
EOSINOPHIL # BLD: 0.3 K/UL (ref 0–0.7)
EOSINOPHIL NFR BLD: 2.8 %
ERYTHROCYTE [DISTWIDTH] IN BLOOD BY AUTOMATED COUNT: 12.4 % (ref 11.5–14.5)
GLUCOSE SERPL-MCNC: 110 MG/DL (ref 70–99)
HCT VFR BLD AUTO: 39 % (ref 37–47)
HGB BLD-MCNC: 13.4 G/DL (ref 12–16)
LYMPHOCYTES # BLD: 2.1 K/UL (ref 1–4.8)
LYMPHOCYTES NFR BLD: 21.6 %
MCH RBC QN AUTO: 31.2 PG (ref 27–31.3)
MCHC RBC AUTO-ENTMCNC: 34.4 % (ref 33–37)
MCV RBC AUTO: 90.7 FL (ref 79.4–94.8)
MONOCYTES # BLD: 1 K/UL (ref 0.2–0.8)
MONOCYTES NFR BLD: 10.4 %
NEUTROPHILS # BLD: 6.1 K/UL (ref 1.4–6.5)
NEUTS SEG NFR BLD: 64.2 %
PLATELET # BLD AUTO: 386 K/UL (ref 130–400)
POTASSIUM SERPL-SCNC: 3.9 MEQ/L (ref 3.4–4.9)
RBC # BLD AUTO: 4.3 M/UL (ref 4.2–5.4)
SODIUM SERPL-SCNC: 137 MEQ/L (ref 135–144)
WBC # BLD AUTO: 9.5 K/UL (ref 4.8–10.8)

## 2025-06-26 PROCEDURE — 6360000002 HC RX W HCPCS: Performed by: INTERNAL MEDICINE

## 2025-06-26 PROCEDURE — 6370000000 HC RX 637 (ALT 250 FOR IP): Performed by: PSYCHIATRY & NEUROLOGY

## 2025-06-26 PROCEDURE — 97530 THERAPEUTIC ACTIVITIES: CPT

## 2025-06-26 PROCEDURE — 6370000000 HC RX 637 (ALT 250 FOR IP): Performed by: INTERNAL MEDICINE

## 2025-06-26 PROCEDURE — 97129 THER IVNTJ 1ST 15 MIN: CPT

## 2025-06-26 PROCEDURE — 97130 THER IVNTJ EA ADDL 15 MIN: CPT

## 2025-06-26 PROCEDURE — 97535 SELF CARE MNGMENT TRAINING: CPT

## 2025-06-26 PROCEDURE — 36415 COLL VENOUS BLD VENIPUNCTURE: CPT

## 2025-06-26 PROCEDURE — 97542 WHEELCHAIR MNGMENT TRAINING: CPT

## 2025-06-26 PROCEDURE — 92507 TX SP LANG VOICE COMM INDIV: CPT

## 2025-06-26 PROCEDURE — 80048 BASIC METABOLIC PNL TOTAL CA: CPT

## 2025-06-26 PROCEDURE — 6370000000 HC RX 637 (ALT 250 FOR IP): Performed by: PHYSICAL MEDICINE & REHABILITATION

## 2025-06-26 PROCEDURE — 99232 SBSQ HOSP IP/OBS MODERATE 35: CPT | Performed by: PHYSICAL MEDICINE & REHABILITATION

## 2025-06-26 PROCEDURE — 97110 THERAPEUTIC EXERCISES: CPT

## 2025-06-26 PROCEDURE — 92526 ORAL FUNCTION THERAPY: CPT

## 2025-06-26 PROCEDURE — 97032 APPL MODALITY 1+ESTIM EA 15: CPT

## 2025-06-26 PROCEDURE — 1180000000 HC REHAB R&B

## 2025-06-26 PROCEDURE — 85025 COMPLETE CBC W/AUTO DIFF WBC: CPT

## 2025-06-26 RX ADMIN — Medication 5 MG: at 20:37

## 2025-06-26 RX ADMIN — CLONIDINE HYDROCHLORIDE 0.2 MG: 0.2 TABLET ORAL at 08:21

## 2025-06-26 RX ADMIN — HYDRALAZINE HYDROCHLORIDE 25 MG: 25 TABLET, FILM COATED ORAL at 08:21

## 2025-06-26 RX ADMIN — NYSTATIN 500000 UNITS: 100000 SUSPENSION ORAL at 13:21

## 2025-06-26 RX ADMIN — Medication 100 MG: at 08:21

## 2025-06-26 RX ADMIN — ATORVASTATIN CALCIUM 80 MG: 80 TABLET, FILM COATED ORAL at 20:36

## 2025-06-26 RX ADMIN — OXYCODONE 5 MG: 5 TABLET ORAL at 20:36

## 2025-06-26 RX ADMIN — ENOXAPARIN SODIUM 40 MG: 100 INJECTION SUBCUTANEOUS at 08:21

## 2025-06-26 RX ADMIN — SERTRALINE 25 MG: 25 TABLET, FILM COATED ORAL at 08:21

## 2025-06-26 RX ADMIN — CLONIDINE HYDROCHLORIDE 0.2 MG: 0.2 TABLET ORAL at 13:21

## 2025-06-26 RX ADMIN — CLOPIDOGREL BISULFATE 75 MG: 75 TABLET, FILM COATED ORAL at 08:21

## 2025-06-26 RX ADMIN — HYDRALAZINE HYDROCHLORIDE 25 MG: 25 TABLET, FILM COATED ORAL at 20:37

## 2025-06-26 RX ADMIN — NYSTATIN 500000 UNITS: 100000 SUSPENSION ORAL at 20:37

## 2025-06-26 RX ADMIN — FLUCONAZOLE 100 MG: 100 TABLET ORAL at 08:21

## 2025-06-26 RX ADMIN — Medication 2 SPRAY: at 08:22

## 2025-06-26 RX ADMIN — Medication 2000 UNITS: at 17:41

## 2025-06-26 RX ADMIN — NYSTATIN 500000 UNITS: 100000 SUSPENSION ORAL at 17:40

## 2025-06-26 RX ADMIN — CLONIDINE HYDROCHLORIDE 0.2 MG: 0.2 TABLET ORAL at 20:37

## 2025-06-26 RX ADMIN — NYSTATIN 500000 UNITS: 100000 SUSPENSION ORAL at 08:21

## 2025-06-26 ASSESSMENT — PAIN SCALES - GENERAL: PAINLEVEL_OUTOF10: 9

## 2025-06-26 NOTE — PLAN OF CARE
Problem: Chronic Conditions and Co-morbidities  Goal: Patient's chronic conditions and co-morbidity symptoms are monitored and maintained or improved  6/26/2025 1512 by Anika Walter RN  Outcome: Progressing  Flowsheets (Taken 6/26/2025 1512)  Care Plan - Patient's Chronic Conditions and Co-Morbidity Symptoms are Monitored and Maintained or Improved:   Collaborate with multidisciplinary team to address chronic and comorbid conditions and prevent exacerbation or deterioration   Monitor and assess patient's chronic conditions and comorbid symptoms for stability, deterioration, or improvement   Update acute care plan with appropriate goals if chronic or comorbid symptoms are exacerbated and prevent overall improvement and discharge    Problem: Discharge Planning  Goal: Discharge to home or other facility with appropriate resources  6/26/2025 1512 by Anika Walter RN  Outcome: Progressing  Flowsheets (Taken 6/26/2025 1512)  Discharge to home or other facility with appropriate resources:   Identify barriers to discharge with patient and caregiver   Arrange for needed discharge resources and transportation as appropriate   Identify discharge learning needs (meds, wound care, etc)   Arrange for interpreters to assist at discharge as needed   Refer to discharge planning if patient needs post-hospital services based on physician order or complex needs related to functional status, cognitive ability or social support system    Problem: Pain  Goal: Verbalizes/displays adequate comfort level or baseline comfort level  6/26/2025 1512 by Anika Walter RN  Outcome: Progressing  Flowsheets (Taken 6/26/2025 1512)  Verbalizes/displays adequate comfort level or baseline comfort level:   Encourage patient to monitor pain and request assistance   Assess pain using appropriate pain scale   Administer analgesics based on type and severity of pain and evaluate response   Implement non-pharmacological measures as appropriate and

## 2025-06-27 PROCEDURE — 97535 SELF CARE MNGMENT TRAINING: CPT

## 2025-06-27 PROCEDURE — 6370000000 HC RX 637 (ALT 250 FOR IP): Performed by: PHYSICAL MEDICINE & REHABILITATION

## 2025-06-27 PROCEDURE — 92507 TX SP LANG VOICE COMM INDIV: CPT

## 2025-06-27 PROCEDURE — 6370000000 HC RX 637 (ALT 250 FOR IP): Performed by: PSYCHIATRY & NEUROLOGY

## 2025-06-27 PROCEDURE — 97112 NEUROMUSCULAR REEDUCATION: CPT

## 2025-06-27 PROCEDURE — 1180000000 HC REHAB R&B

## 2025-06-27 PROCEDURE — 6370000000 HC RX 637 (ALT 250 FOR IP): Performed by: INTERNAL MEDICINE

## 2025-06-27 PROCEDURE — 97116 GAIT TRAINING THERAPY: CPT

## 2025-06-27 PROCEDURE — 97130 THER IVNTJ EA ADDL 15 MIN: CPT

## 2025-06-27 PROCEDURE — 97533 SENSORY INTEGRATION: CPT

## 2025-06-27 PROCEDURE — 6360000002 HC RX W HCPCS: Performed by: INTERNAL MEDICINE

## 2025-06-27 PROCEDURE — 97032 APPL MODALITY 1+ESTIM EA 15: CPT

## 2025-06-27 PROCEDURE — 97110 THERAPEUTIC EXERCISES: CPT

## 2025-06-27 PROCEDURE — 97542 WHEELCHAIR MNGMENT TRAINING: CPT

## 2025-06-27 PROCEDURE — 97129 THER IVNTJ 1ST 15 MIN: CPT

## 2025-06-27 PROCEDURE — 99232 SBSQ HOSP IP/OBS MODERATE 35: CPT | Performed by: PHYSICAL MEDICINE & REHABILITATION

## 2025-06-27 RX ADMIN — CLONIDINE HYDROCHLORIDE 0.2 MG: 0.2 TABLET ORAL at 16:48

## 2025-06-27 RX ADMIN — CLONIDINE HYDROCHLORIDE 0.2 MG: 0.2 TABLET ORAL at 20:56

## 2025-06-27 RX ADMIN — CLOPIDOGREL BISULFATE 75 MG: 75 TABLET, FILM COATED ORAL at 08:18

## 2025-06-27 RX ADMIN — ENOXAPARIN SODIUM 40 MG: 100 INJECTION SUBCUTANEOUS at 08:19

## 2025-06-27 RX ADMIN — Medication 5 MG: at 20:56

## 2025-06-27 RX ADMIN — NYSTATIN 500000 UNITS: 100000 SUSPENSION ORAL at 20:55

## 2025-06-27 RX ADMIN — HYDRALAZINE HYDROCHLORIDE 25 MG: 25 TABLET, FILM COATED ORAL at 08:18

## 2025-06-27 RX ADMIN — ATORVASTATIN CALCIUM 80 MG: 80 TABLET, FILM COATED ORAL at 20:56

## 2025-06-27 RX ADMIN — NYSTATIN 500000 UNITS: 100000 SUSPENSION ORAL at 08:19

## 2025-06-27 RX ADMIN — ACETAMINOPHEN 650 MG: 325 TABLET ORAL at 08:19

## 2025-06-27 RX ADMIN — SERTRALINE 25 MG: 25 TABLET, FILM COATED ORAL at 08:18

## 2025-06-27 RX ADMIN — Medication 100 MG: at 08:18

## 2025-06-27 RX ADMIN — CLONIDINE HYDROCHLORIDE 0.2 MG: 0.2 TABLET ORAL at 08:18

## 2025-06-27 RX ADMIN — Medication 2000 UNITS: at 16:48

## 2025-06-27 RX ADMIN — NYSTATIN 500000 UNITS: 100000 SUSPENSION ORAL at 16:48

## 2025-06-27 RX ADMIN — HYDRALAZINE HYDROCHLORIDE 25 MG: 25 TABLET, FILM COATED ORAL at 20:56

## 2025-06-27 ASSESSMENT — PAIN SCALES - GENERAL
PAINLEVEL_OUTOF10: 8
PAINLEVEL_OUTOF10: 0

## 2025-06-27 ASSESSMENT — PAIN DESCRIPTION - DESCRIPTORS: DESCRIPTORS: ACHING

## 2025-06-27 ASSESSMENT — PAIN DESCRIPTION - LOCATION: LOCATION: BACK

## 2025-06-27 ASSESSMENT — PAIN DESCRIPTION - ORIENTATION: ORIENTATION: MID;LOWER

## 2025-06-27 NOTE — FLOWSHEET NOTE
This nurse perfect serve message sent to [facility]; \" Hi, Dr. Just FYI. Patient BP is 185/75, HR 74. Her BP is usually elevated. I will give her catapres. I couldnt give earlier as scheduled she was in PT. Any other recommendations?    1715: No oither recommendations per

## 2025-06-27 NOTE — PLAN OF CARE
Problem: Chronic Conditions and Co-morbidities  Goal: Patient's chronic conditions and co-morbidity symptoms are monitored and maintained or improved  6/26/2025 2231 by Yuni Carrasco RN  Outcome: Progressing  6/26/2025 1512 by Anika Walter RN  Outcome: Progressing  Flowsheets (Taken 6/26/2025 1512)  Care Plan - Patient's Chronic Conditions and Co-Morbidity Symptoms are Monitored and Maintained or Improved:   Collaborate with multidisciplinary team to address chronic and comorbid conditions and prevent exacerbation or deterioration   Monitor and assess patient's chronic conditions and comorbid symptoms for stability, deterioration, or improvement   Update acute care plan with appropriate goals if chronic or comorbid symptoms are exacerbated and prevent overall improvement and discharge     Problem: Discharge Planning  Goal: Discharge to home or other facility with appropriate resources  6/26/2025 2231 by Yuni Carrasco RN  Outcome: Progressing  6/26/2025 1512 by Anika Walter RN  Outcome: Progressing  Flowsheets (Taken 6/26/2025 1512)  Discharge to home or other facility with appropriate resources:   Identify barriers to discharge with patient and caregiver   Arrange for needed discharge resources and transportation as appropriate   Identify discharge learning needs (meds, wound care, etc)   Arrange for interpreters to assist at discharge as needed   Refer to discharge planning if patient needs post-hospital services based on physician order or complex needs related to functional status, cognitive ability or social support system     Problem: Pain  Goal: Verbalizes/displays adequate comfort level or baseline comfort level  6/26/2025 2231 by Yuni Carrasco RN  Outcome: Progressing  6/26/2025 1512 by Anika Walter RN  Outcome: Progressing  Flowsheets (Taken 6/26/2025 1512)  Verbalizes/displays adequate comfort level or baseline comfort level:   Encourage patient to monitor pain and request

## 2025-06-28 PROCEDURE — 99232 SBSQ HOSP IP/OBS MODERATE 35: CPT | Performed by: PHYSICAL MEDICINE & REHABILITATION

## 2025-06-28 PROCEDURE — 1180000000 HC REHAB R&B

## 2025-06-28 PROCEDURE — 6370000000 HC RX 637 (ALT 250 FOR IP): Performed by: PHYSICAL MEDICINE & REHABILITATION

## 2025-06-28 PROCEDURE — 6370000000 HC RX 637 (ALT 250 FOR IP): Performed by: INTERNAL MEDICINE

## 2025-06-28 PROCEDURE — 6370000000 HC RX 637 (ALT 250 FOR IP): Performed by: PSYCHIATRY & NEUROLOGY

## 2025-06-28 PROCEDURE — 97535 SELF CARE MNGMENT TRAINING: CPT

## 2025-06-28 PROCEDURE — 6360000002 HC RX W HCPCS: Performed by: INTERNAL MEDICINE

## 2025-06-28 PROCEDURE — 97112 NEUROMUSCULAR REEDUCATION: CPT

## 2025-06-28 RX ORDER — OXYCODONE AND ACETAMINOPHEN 7.5; 325 MG/1; MG/1
1 TABLET ORAL EVERY 4 HOURS PRN
Refills: 0 | Status: DISCONTINUED | OUTPATIENT
Start: 2025-06-28 | End: 2025-07-04

## 2025-06-28 RX ADMIN — NYSTATIN 500000 UNITS: 100000 SUSPENSION ORAL at 20:46

## 2025-06-28 RX ADMIN — Medication 2000 UNITS: at 17:25

## 2025-06-28 RX ADMIN — NYSTATIN 500000 UNITS: 100000 SUSPENSION ORAL at 13:47

## 2025-06-28 RX ADMIN — NYSTATIN 500000 UNITS: 100000 SUSPENSION ORAL at 08:13

## 2025-06-28 RX ADMIN — CLONIDINE HYDROCHLORIDE 0.2 MG: 0.2 TABLET ORAL at 13:47

## 2025-06-28 RX ADMIN — OXYCODONE AND ACETAMINOPHEN 1 TABLET: 7.5; 325 TABLET ORAL at 17:25

## 2025-06-28 RX ADMIN — ATORVASTATIN CALCIUM 80 MG: 80 TABLET, FILM COATED ORAL at 20:46

## 2025-06-28 RX ADMIN — CLONIDINE HYDROCHLORIDE 0.2 MG: 0.2 TABLET ORAL at 20:46

## 2025-06-28 RX ADMIN — HYDRALAZINE HYDROCHLORIDE 25 MG: 25 TABLET, FILM COATED ORAL at 08:13

## 2025-06-28 RX ADMIN — SERTRALINE 25 MG: 25 TABLET, FILM COATED ORAL at 08:13

## 2025-06-28 RX ADMIN — CLOPIDOGREL BISULFATE 75 MG: 75 TABLET, FILM COATED ORAL at 08:13

## 2025-06-28 RX ADMIN — Medication 100 MG: at 08:13

## 2025-06-28 RX ADMIN — CLONIDINE HYDROCHLORIDE 0.2 MG: 0.2 TABLET ORAL at 08:13

## 2025-06-28 RX ADMIN — OXYCODONE 5 MG: 5 TABLET ORAL at 03:05

## 2025-06-28 RX ADMIN — NYSTATIN 500000 UNITS: 100000 SUSPENSION ORAL at 17:24

## 2025-06-28 RX ADMIN — Medication 5 MG: at 20:46

## 2025-06-28 RX ADMIN — HYDRALAZINE HYDROCHLORIDE 25 MG: 25 TABLET, FILM COATED ORAL at 20:46

## 2025-06-28 RX ADMIN — ENOXAPARIN SODIUM 40 MG: 100 INJECTION SUBCUTANEOUS at 08:12

## 2025-06-28 ASSESSMENT — PAIN DESCRIPTION - ORIENTATION: ORIENTATION: MID

## 2025-06-28 ASSESSMENT — PAIN DESCRIPTION - LOCATION
LOCATION: BACK
LOCATION: BACK

## 2025-06-28 ASSESSMENT — PAIN DESCRIPTION - DESCRIPTORS
DESCRIPTORS: ACHING;DISCOMFORT
DESCRIPTORS: ACHING

## 2025-06-28 ASSESSMENT — PAIN SCALES - GENERAL
PAINLEVEL_OUTOF10: 7
PAINLEVEL_OUTOF10: 7

## 2025-06-28 NOTE — PLAN OF CARE
Problem: Chronic Conditions and Co-morbidities  Goal: Patient's chronic conditions and co-morbidity symptoms are monitored and maintained or improved  Outcome: Progressing     Problem: Discharge Planning  Goal: Discharge to home or other facility with appropriate resources  Outcome: Progressing     Problem: Pain  Goal: Verbalizes/displays adequate comfort level or baseline comfort level  6/28/2025 1326 by Marika Rose RN  Outcome: Progressing     Problem: Safety - Adult  Goal: Free from fall injury  6/28/2025 1326 by Marika Rose RN  Outcome: Progressing     Problem: Skin/Tissue Integrity  Goal: Skin integrity remains intact  Description: 1.  Monitor for areas of redness and/or skin breakdown  2.  Assess vascular access sites hourly  3.  Every 4-6 hours minimum:  Change oxygen saturation probe site  4.  Every 4-6 hours:  If on nasal continuous positive airway pressure, respiratory therapy assess nares and determine need for appliance change or resting period  Outcome: Progressing

## 2025-06-28 NOTE — FLOWSHEET NOTE
Patient complains of back pain rated 7/10. Repositioned patient in bed. Patient medicated with Oxycodone 5 MG PO PRN per request for back pain. Patient verbalized no further needs at this time. Bed alarm is engaged and call light is within reach.

## 2025-06-28 NOTE — FLOWSHEET NOTE
Patient is resting in bed at this time with no complaints of pain or discomfort. Patient was incontinent of urine and needed a complete bed and linen change. Applied hospital gown. Patient was assisted with chaparro-care. A depends with external purewick was applied. Patient is flaccid to her left arm/hand/leg/foot. Patient states she does have full sensation and denies any numbness and tingling. Patient has a slight left side facial droop. Patient is alert and oriented to all questions asked. Patient took her evening medications whole at once with sips of water without difficulty. Patients SPO2 is 96% on RA. Patient complains of a smokers cough. Patient states she is now comfortable and verbalized no further needs at this time. Patients bed alarm is engaged and call light is within reach.

## 2025-06-29 PROCEDURE — 6370000000 HC RX 637 (ALT 250 FOR IP): Performed by: PHYSICAL MEDICINE & REHABILITATION

## 2025-06-29 PROCEDURE — 6370000000 HC RX 637 (ALT 250 FOR IP): Performed by: PSYCHIATRY & NEUROLOGY

## 2025-06-29 PROCEDURE — 99232 SBSQ HOSP IP/OBS MODERATE 35: CPT | Performed by: PHYSICAL MEDICINE & REHABILITATION

## 2025-06-29 PROCEDURE — 6360000002 HC RX W HCPCS: Performed by: INTERNAL MEDICINE

## 2025-06-29 PROCEDURE — 6370000000 HC RX 637 (ALT 250 FOR IP): Performed by: INTERNAL MEDICINE

## 2025-06-29 PROCEDURE — 1180000000 HC REHAB R&B

## 2025-06-29 RX ORDER — AMLODIPINE BESYLATE 5 MG/1
5 TABLET ORAL DAILY
Status: DISCONTINUED | OUTPATIENT
Start: 2025-06-29 | End: 2025-06-30

## 2025-06-29 RX ORDER — LACTULOSE 10 G/15ML
20 SOLUTION ORAL DAILY
Status: DISCONTINUED | OUTPATIENT
Start: 2025-06-29 | End: 2025-07-04

## 2025-06-29 RX ADMIN — CLOPIDOGREL BISULFATE 75 MG: 75 TABLET, FILM COATED ORAL at 11:34

## 2025-06-29 RX ADMIN — LACTULOSE 20 G: 10 SOLUTION ORAL at 11:39

## 2025-06-29 RX ADMIN — NYSTATIN 500000 UNITS: 100000 SUSPENSION ORAL at 11:45

## 2025-06-29 RX ADMIN — ENOXAPARIN SODIUM 40 MG: 100 INJECTION SUBCUTANEOUS at 11:38

## 2025-06-29 RX ADMIN — HYDRALAZINE HYDROCHLORIDE 25 MG: 25 TABLET, FILM COATED ORAL at 11:34

## 2025-06-29 RX ADMIN — CLONIDINE HYDROCHLORIDE 0.2 MG: 0.2 TABLET ORAL at 11:33

## 2025-06-29 RX ADMIN — Medication 5 MG: at 21:01

## 2025-06-29 RX ADMIN — CLONIDINE HYDROCHLORIDE 0.2 MG: 0.2 TABLET ORAL at 14:56

## 2025-06-29 RX ADMIN — AMLODIPINE BESYLATE 5 MG: 5 TABLET ORAL at 17:52

## 2025-06-29 RX ADMIN — Medication 100 MG: at 11:35

## 2025-06-29 RX ADMIN — HYDRALAZINE HYDROCHLORIDE 25 MG: 25 TABLET, FILM COATED ORAL at 19:01

## 2025-06-29 RX ADMIN — NYSTATIN 500000 UNITS: 100000 SUSPENSION ORAL at 17:53

## 2025-06-29 RX ADMIN — ATORVASTATIN CALCIUM 80 MG: 80 TABLET, FILM COATED ORAL at 21:02

## 2025-06-29 RX ADMIN — OXYCODONE AND ACETAMINOPHEN 1 TABLET: 7.5; 325 TABLET ORAL at 11:37

## 2025-06-29 RX ADMIN — NYSTATIN 500000 UNITS: 100000 SUSPENSION ORAL at 14:51

## 2025-06-29 RX ADMIN — Medication 2000 UNITS: at 17:09

## 2025-06-29 RX ADMIN — CLONIDINE HYDROCHLORIDE 0.2 MG: 0.2 TABLET ORAL at 21:02

## 2025-06-29 RX ADMIN — SERTRALINE 25 MG: 25 TABLET, FILM COATED ORAL at 11:34

## 2025-06-29 ASSESSMENT — PAIN DESCRIPTION - LOCATION: LOCATION: ARM

## 2025-06-29 ASSESSMENT — PAIN SCALES - GENERAL: PAINLEVEL_OUTOF10: 8

## 2025-06-29 ASSESSMENT — PAIN DESCRIPTION - DESCRIPTORS: DESCRIPTORS: ACHING

## 2025-06-29 ASSESSMENT — PAIN DESCRIPTION - ORIENTATION: ORIENTATION: LEFT

## 2025-06-29 NOTE — PLAN OF CARE
Problem: Chronic Conditions and Co-morbidities  Goal: Patient's chronic conditions and co-morbidity symptoms are monitored and maintained or improved  6/29/2025 0253 by Doreen Wesley RN  Outcome: Progressing  Flowsheets (Taken 6/28/2025 2130)  Care Plan - Patient's Chronic Conditions and Co-Morbidity Symptoms are Monitored and Maintained or Improved: Collaborate with multidisciplinary team to address chronic and comorbid conditions and prevent exacerbation or deterioration  6/28/2025 1326 by Marika Rose RN  Outcome: Progressing     Problem: Discharge Planning  Goal: Discharge to home or other facility with appropriate resources  6/29/2025 0253 by Doreen Wesley RN  Outcome: Progressing  Flowsheets (Taken 6/28/2025 2130)  Discharge to home or other facility with appropriate resources: Identify barriers to discharge with patient and caregiver  6/28/2025 1326 by Marika Rose RN  Outcome: Progressing     Problem: Pain  Goal: Verbalizes/displays adequate comfort level or baseline comfort level  6/29/2025 0253 by Doreen Wesley RN  Outcome: Progressing  6/28/2025 1326 by Marika Rose RN  Outcome: Progressing     Problem: Safety - Adult  Goal: Free from fall injury  6/29/2025 0253 by Doreen Wesley RN  Outcome: Progressing  6/28/2025 1326 by Marika Rose RN  Outcome: Progressing     Problem: Skin/Tissue Integrity  Goal: Skin integrity remains intact  Description: 1.  Monitor for areas of redness and/or skin breakdown  2.  Assess vascular access sites hourly  3.  Every 4-6 hours minimum:  Change oxygen saturation probe site  4.  Every 4-6 hours:  If on nasal continuous positive airway pressure, respiratory therapy assess nares and determine need for appliance change or resting period  6/29/2025 0253 by Doreen Wesley RN  Outcome: Progressing  Flowsheets  Taken 6/29/2025 0253  Skin Integrity Remains Intact: Monitor for areas of redness and/or skin breakdown  Taken 6/28/2025 2130  Skin Integrity Remains

## 2025-06-30 LAB
ANION GAP SERPL CALCULATED.3IONS-SCNC: 10 MEQ/L (ref 9–15)
BASOPHILS # BLD: 0 K/UL (ref 0–0.2)
BASOPHILS NFR BLD: 0.4 %
BUN SERPL-MCNC: 15 MG/DL (ref 6–20)
CALCIUM SERPL-MCNC: 9.2 MG/DL (ref 8.5–9.9)
CHLORIDE SERPL-SCNC: 98 MEQ/L (ref 95–107)
CO2 SERPL-SCNC: 27 MEQ/L (ref 20–31)
CREAT SERPL-MCNC: 0.64 MG/DL (ref 0.5–0.9)
EOSINOPHIL # BLD: 0.2 K/UL (ref 0–0.7)
EOSINOPHIL NFR BLD: 2.8 %
ERYTHROCYTE [DISTWIDTH] IN BLOOD BY AUTOMATED COUNT: 12.5 % (ref 11.5–14.5)
GLUCOSE SERPL-MCNC: 112 MG/DL (ref 70–99)
HCT VFR BLD AUTO: 40.8 % (ref 37–47)
HGB BLD-MCNC: 13.9 G/DL (ref 12–16)
LYMPHOCYTES # BLD: 2.2 K/UL (ref 1–4.8)
LYMPHOCYTES NFR BLD: 27.6 %
MCH RBC QN AUTO: 30.3 PG (ref 27–31.3)
MCHC RBC AUTO-ENTMCNC: 34.1 % (ref 33–37)
MCV RBC AUTO: 88.9 FL (ref 79.4–94.8)
MONOCYTES # BLD: 0.7 K/UL (ref 0.2–0.8)
MONOCYTES NFR BLD: 8.8 %
NEUTROPHILS # BLD: 4.7 K/UL (ref 1.4–6.5)
NEUTS SEG NFR BLD: 59.6 %
PLATELET # BLD AUTO: 418 K/UL (ref 130–400)
POTASSIUM SERPL-SCNC: 4.6 MEQ/L (ref 3.4–4.9)
RBC # BLD AUTO: 4.59 M/UL (ref 4.2–5.4)
SODIUM SERPL-SCNC: 135 MEQ/L (ref 135–144)
WBC # BLD AUTO: 7.9 K/UL (ref 4.8–10.8)

## 2025-06-30 PROCEDURE — 97032 APPL MODALITY 1+ESTIM EA 15: CPT

## 2025-06-30 PROCEDURE — 97535 SELF CARE MNGMENT TRAINING: CPT

## 2025-06-30 PROCEDURE — 92526 ORAL FUNCTION THERAPY: CPT

## 2025-06-30 PROCEDURE — 6370000000 HC RX 637 (ALT 250 FOR IP): Performed by: PSYCHIATRY & NEUROLOGY

## 2025-06-30 PROCEDURE — 97533 SENSORY INTEGRATION: CPT

## 2025-06-30 PROCEDURE — 6370000000 HC RX 637 (ALT 250 FOR IP): Performed by: PHYSICAL MEDICINE & REHABILITATION

## 2025-06-30 PROCEDURE — 97140 MANUAL THERAPY 1/> REGIONS: CPT

## 2025-06-30 PROCEDURE — 99233 SBSQ HOSP IP/OBS HIGH 50: CPT | Performed by: PHYSICAL MEDICINE & REHABILITATION

## 2025-06-30 PROCEDURE — 36415 COLL VENOUS BLD VENIPUNCTURE: CPT

## 2025-06-30 PROCEDURE — 80048 BASIC METABOLIC PNL TOTAL CA: CPT

## 2025-06-30 PROCEDURE — 99231 SBSQ HOSP IP/OBS SF/LOW 25: CPT | Performed by: NURSE PRACTITIONER

## 2025-06-30 PROCEDURE — 6370000000 HC RX 637 (ALT 250 FOR IP): Performed by: INTERNAL MEDICINE

## 2025-06-30 PROCEDURE — 92507 TX SP LANG VOICE COMM INDIV: CPT

## 2025-06-30 PROCEDURE — 6360000002 HC RX W HCPCS: Performed by: INTERNAL MEDICINE

## 2025-06-30 PROCEDURE — 97116 GAIT TRAINING THERAPY: CPT

## 2025-06-30 PROCEDURE — 97110 THERAPEUTIC EXERCISES: CPT

## 2025-06-30 PROCEDURE — 6360000002 HC RX W HCPCS: Performed by: PHYSICAL MEDICINE & REHABILITATION

## 2025-06-30 PROCEDURE — 97542 WHEELCHAIR MNGMENT TRAINING: CPT

## 2025-06-30 PROCEDURE — 1180000000 HC REHAB R&B

## 2025-06-30 PROCEDURE — 85025 COMPLETE CBC W/AUTO DIFF WBC: CPT

## 2025-06-30 RX ORDER — AMLODIPINE BESYLATE 10 MG/1
10 TABLET ORAL DAILY
Status: DISCONTINUED | OUTPATIENT
Start: 2025-07-01 | End: 2025-07-16 | Stop reason: HOSPADM

## 2025-06-30 RX ADMIN — ATORVASTATIN CALCIUM 80 MG: 80 TABLET, FILM COATED ORAL at 22:01

## 2025-06-30 RX ADMIN — HYDRALAZINE HYDROCHLORIDE 25 MG: 25 TABLET, FILM COATED ORAL at 08:26

## 2025-06-30 RX ADMIN — Medication 2000 UNITS: at 16:58

## 2025-06-30 RX ADMIN — CLONIDINE HYDROCHLORIDE 0.2 MG: 0.2 TABLET ORAL at 16:58

## 2025-06-30 RX ADMIN — CLOPIDOGREL BISULFATE 75 MG: 75 TABLET, FILM COATED ORAL at 08:26

## 2025-06-30 RX ADMIN — CLONIDINE HYDROCHLORIDE 0.2 MG: 0.2 TABLET ORAL at 22:01

## 2025-06-30 RX ADMIN — NYSTATIN 500000 UNITS: 100000 SUSPENSION ORAL at 16:58

## 2025-06-30 RX ADMIN — NYSTATIN 500000 UNITS: 100000 SUSPENSION ORAL at 22:02

## 2025-06-30 RX ADMIN — ENOXAPARIN SODIUM 40 MG: 100 INJECTION SUBCUTANEOUS at 08:27

## 2025-06-30 RX ADMIN — LACTULOSE 20 G: 10 SOLUTION ORAL at 08:27

## 2025-06-30 RX ADMIN — AMLODIPINE BESYLATE 5 MG: 5 TABLET ORAL at 08:26

## 2025-06-30 RX ADMIN — OXYCODONE AND ACETAMINOPHEN 1 TABLET: 7.5; 325 TABLET ORAL at 22:01

## 2025-06-30 RX ADMIN — SERTRALINE 25 MG: 25 TABLET, FILM COATED ORAL at 08:26

## 2025-06-30 RX ADMIN — CLONIDINE HYDROCHLORIDE 0.2 MG: 0.2 TABLET ORAL at 08:26

## 2025-06-30 RX ADMIN — Medication 5 MG: at 22:01

## 2025-06-30 RX ADMIN — OXYCODONE AND ACETAMINOPHEN 1 TABLET: 7.5; 325 TABLET ORAL at 16:58

## 2025-06-30 RX ADMIN — NYSTATIN 500000 UNITS: 100000 SUSPENSION ORAL at 08:27

## 2025-06-30 RX ADMIN — CYANOCOBALAMIN 1000 MCG: 1000 INJECTION, SOLUTION INTRAMUSCULAR; SUBCUTANEOUS at 08:27

## 2025-06-30 RX ADMIN — Medication 100 MG: at 08:26

## 2025-06-30 RX ADMIN — OXYCODONE AND ACETAMINOPHEN 1 TABLET: 7.5; 325 TABLET ORAL at 08:26

## 2025-06-30 RX ADMIN — HYDRALAZINE HYDROCHLORIDE 25 MG: 25 TABLET, FILM COATED ORAL at 22:01

## 2025-06-30 ASSESSMENT — PAIN SCALES - GENERAL
PAINLEVEL_OUTOF10: 7
PAINLEVEL_OUTOF10: 4
PAINLEVEL_OUTOF10: 6
PAINLEVEL_OUTOF10: 8
PAINLEVEL_OUTOF10: 5

## 2025-06-30 ASSESSMENT — PAIN DESCRIPTION - DESCRIPTORS
DESCRIPTORS: ACHING
DESCRIPTORS: ACHING
DESCRIPTORS: ACHING;DISCOMFORT

## 2025-06-30 ASSESSMENT — PAIN DESCRIPTION - LOCATION
LOCATION: ARM

## 2025-06-30 ASSESSMENT — PAIN DESCRIPTION - ORIENTATION
ORIENTATION: LEFT

## 2025-06-30 NOTE — PLAN OF CARE
Problem: Chronic Conditions and Co-morbidities  Goal: Patient's chronic conditions and co-morbidity symptoms are monitored and maintained or improved  Outcome: Progressing  Flowsheets (Taken 6/29/2025 2050)  Care Plan - Patient's Chronic Conditions and Co-Morbidity Symptoms are Monitored and Maintained or Improved: Monitor and assess patient's chronic conditions and comorbid symptoms for stability, deterioration, or improvement

## 2025-07-01 PROCEDURE — 6370000000 HC RX 637 (ALT 250 FOR IP): Performed by: INTERNAL MEDICINE

## 2025-07-01 PROCEDURE — 92507 TX SP LANG VOICE COMM INDIV: CPT

## 2025-07-01 PROCEDURE — 97110 THERAPEUTIC EXERCISES: CPT

## 2025-07-01 PROCEDURE — 99232 SBSQ HOSP IP/OBS MODERATE 35: CPT | Performed by: PHYSICAL MEDICINE & REHABILITATION

## 2025-07-01 PROCEDURE — 97533 SENSORY INTEGRATION: CPT

## 2025-07-01 PROCEDURE — 97530 THERAPEUTIC ACTIVITIES: CPT

## 2025-07-01 PROCEDURE — 97535 SELF CARE MNGMENT TRAINING: CPT

## 2025-07-01 PROCEDURE — 97542 WHEELCHAIR MNGMENT TRAINING: CPT

## 2025-07-01 PROCEDURE — 97116 GAIT TRAINING THERAPY: CPT

## 2025-07-01 PROCEDURE — 6360000002 HC RX W HCPCS: Performed by: INTERNAL MEDICINE

## 2025-07-01 PROCEDURE — 92526 ORAL FUNCTION THERAPY: CPT

## 2025-07-01 PROCEDURE — 6370000000 HC RX 637 (ALT 250 FOR IP): Performed by: PSYCHIATRY & NEUROLOGY

## 2025-07-01 PROCEDURE — 97760 ORTHOTIC MGMT&TRAING 1ST ENC: CPT

## 2025-07-01 PROCEDURE — 6370000000 HC RX 637 (ALT 250 FOR IP): Performed by: PHYSICAL MEDICINE & REHABILITATION

## 2025-07-01 PROCEDURE — 97112 NEUROMUSCULAR REEDUCATION: CPT

## 2025-07-01 PROCEDURE — 1180000000 HC REHAB R&B

## 2025-07-01 RX ORDER — SUCRALFATE 1 G/1
1 TABLET ORAL
Status: COMPLETED | OUTPATIENT
Start: 2025-07-01 | End: 2025-07-02

## 2025-07-01 RX ORDER — CALCIUM CARBONATE 500 MG/1
500 TABLET, CHEWABLE ORAL 2 TIMES DAILY
Status: DISCONTINUED | OUTPATIENT
Start: 2025-07-01 | End: 2025-07-16 | Stop reason: HOSPADM

## 2025-07-01 RX ORDER — PANTOPRAZOLE SODIUM 40 MG/1
40 TABLET, DELAYED RELEASE ORAL
Status: DISCONTINUED | OUTPATIENT
Start: 2025-07-01 | End: 2025-07-16 | Stop reason: HOSPADM

## 2025-07-01 RX ADMIN — SUCRALFATE 1 G: 1 TABLET ORAL at 18:04

## 2025-07-01 RX ADMIN — NYSTATIN 500000 UNITS: 100000 SUSPENSION ORAL at 18:03

## 2025-07-01 RX ADMIN — OXYCODONE AND ACETAMINOPHEN 1 TABLET: 7.5; 325 TABLET ORAL at 20:25

## 2025-07-01 RX ADMIN — Medication 2000 UNITS: at 18:03

## 2025-07-01 RX ADMIN — NYSTATIN 500000 UNITS: 100000 SUSPENSION ORAL at 14:40

## 2025-07-01 RX ADMIN — NYSTATIN 500000 UNITS: 100000 SUSPENSION ORAL at 09:05

## 2025-07-01 RX ADMIN — Medication 100 MG: at 09:04

## 2025-07-01 RX ADMIN — ANTACID TABLETS 500 MG: 500 TABLET, CHEWABLE ORAL at 20:28

## 2025-07-01 RX ADMIN — SUCRALFATE 1 G: 1 TABLET ORAL at 20:27

## 2025-07-01 RX ADMIN — ANTACID TABLETS 500 MG: 500 TABLET, CHEWABLE ORAL at 09:04

## 2025-07-01 RX ADMIN — NYSTATIN 500000 UNITS: 100000 SUSPENSION ORAL at 20:25

## 2025-07-01 RX ADMIN — ATORVASTATIN CALCIUM 80 MG: 80 TABLET, FILM COATED ORAL at 20:28

## 2025-07-01 RX ADMIN — CLONIDINE HYDROCHLORIDE 0.2 MG: 0.2 TABLET ORAL at 09:04

## 2025-07-01 RX ADMIN — CLOPIDOGREL BISULFATE 75 MG: 75 TABLET, FILM COATED ORAL at 09:04

## 2025-07-01 RX ADMIN — AMLODIPINE BESYLATE 10 MG: 10 TABLET ORAL at 09:05

## 2025-07-01 RX ADMIN — CLONIDINE HYDROCHLORIDE 0.2 MG: 0.2 TABLET ORAL at 20:40

## 2025-07-01 RX ADMIN — SERTRALINE 25 MG: 25 TABLET, FILM COATED ORAL at 09:04

## 2025-07-01 RX ADMIN — SUCRALFATE 1 G: 1 TABLET ORAL at 09:04

## 2025-07-01 RX ADMIN — Medication 5 MG: at 20:28

## 2025-07-01 RX ADMIN — ENOXAPARIN SODIUM 40 MG: 100 INJECTION SUBCUTANEOUS at 09:05

## 2025-07-01 RX ADMIN — CLONIDINE HYDROCHLORIDE 0.2 MG: 0.2 TABLET ORAL at 14:40

## 2025-07-01 ASSESSMENT — PAIN DESCRIPTION - ORIENTATION
ORIENTATION: LEFT

## 2025-07-01 ASSESSMENT — PAIN SCALES - GENERAL
PAINLEVEL_OUTOF10: 7
PAINLEVEL_OUTOF10: 7
PAINLEVEL_OUTOF10: 5

## 2025-07-01 ASSESSMENT — PAIN - FUNCTIONAL ASSESSMENT: PAIN_FUNCTIONAL_ASSESSMENT: ACTIVITIES ARE NOT PREVENTED

## 2025-07-01 ASSESSMENT — PAIN DESCRIPTION - LOCATION
LOCATION: ARM

## 2025-07-01 ASSESSMENT — PAIN DESCRIPTION - DESCRIPTORS
DESCRIPTORS: THROBBING

## 2025-07-01 NOTE — PLAN OF CARE
Problem: Chronic Conditions and Co-morbidities  Goal: Patient's chronic conditions and co-morbidity symptoms are monitored and maintained or improved  Outcome: Progressing     Problem: Discharge Planning  Goal: Discharge to home or other facility with appropriate resources  Outcome: Progressing     Problem: Pain  Goal: Verbalizes/displays adequate comfort level or baseline comfort level  Outcome: Progressing  Flowsheets (Taken 7/1/2025 1430)  Verbalizes/displays adequate comfort level or baseline comfort level:   Encourage patient to monitor pain and request assistance   Assess pain using appropriate pain scale   Administer analgesics based on type and severity of pain and evaluate response   Implement non-pharmacological measures as appropriate and evaluate response   Consider cultural and social influences on pain and pain management   Notify Licensed Independent Practitioner if interventions unsuccessful or patient reports new pain     Problem: Safety - Adult  Goal: Free from fall injury  7/1/2025 1051 by Deanna Weinstein, RN  Outcome: Progressing  7/1/2025 0506 by Viji Carlisle, RN  Outcome: Progressing     Problem: Skin/Tissue Integrity  Goal: Skin integrity remains intact  Description: 1.  Monitor for areas of redness and/or skin breakdown  2.  Assess vascular access sites hourly  3.  Every 4-6 hours minimum:  Change oxygen saturation probe site  4.  Every 4-6 hours:  If on nasal continuous positive airway pressure, respiratory therapy assess nares and determine need for appliance change or resting period  Outcome: Progressing

## 2025-07-01 NOTE — FLOWSHEET NOTE
Patient was sitting up in the wheelchair and requesting to get ready for bed. Patient complains of left arm pain rated 7/10. Patient medicated with Percocet 7.5-325 MG PO PRN for pain with the rest of her evening medications. Patient took her medications whole at once with sips of water without difficulty. Patient declined an  snack. Patient was transferred from her wheelchair to the bed with the tremaine-steady and two assist. Patient requested to wear the purewick at HS with a depends in case of incontinent episodes. Patient repositioned in bed with several pillows in place for comfort. Patients bed alarm is engaged and call light is within reach.

## 2025-07-02 PROCEDURE — 99231 SBSQ HOSP IP/OBS SF/LOW 25: CPT | Performed by: NURSE PRACTITIONER

## 2025-07-02 PROCEDURE — 6370000000 HC RX 637 (ALT 250 FOR IP): Performed by: INTERNAL MEDICINE

## 2025-07-02 PROCEDURE — 6360000002 HC RX W HCPCS: Performed by: INTERNAL MEDICINE

## 2025-07-02 PROCEDURE — 97533 SENSORY INTEGRATION: CPT

## 2025-07-02 PROCEDURE — 99232 SBSQ HOSP IP/OBS MODERATE 35: CPT | Performed by: PHYSICAL MEDICINE & REHABILITATION

## 2025-07-02 PROCEDURE — 6370000000 HC RX 637 (ALT 250 FOR IP): Performed by: PSYCHIATRY & NEUROLOGY

## 2025-07-02 PROCEDURE — 97110 THERAPEUTIC EXERCISES: CPT

## 2025-07-02 PROCEDURE — 97130 THER IVNTJ EA ADDL 15 MIN: CPT

## 2025-07-02 PROCEDURE — 92507 TX SP LANG VOICE COMM INDIV: CPT

## 2025-07-02 PROCEDURE — 6370000000 HC RX 637 (ALT 250 FOR IP): Performed by: PHYSICAL MEDICINE & REHABILITATION

## 2025-07-02 PROCEDURE — 97116 GAIT TRAINING THERAPY: CPT

## 2025-07-02 PROCEDURE — 97140 MANUAL THERAPY 1/> REGIONS: CPT

## 2025-07-02 PROCEDURE — 97129 THER IVNTJ 1ST 15 MIN: CPT

## 2025-07-02 PROCEDURE — 97112 NEUROMUSCULAR REEDUCATION: CPT

## 2025-07-02 PROCEDURE — 97032 APPL MODALITY 1+ESTIM EA 15: CPT

## 2025-07-02 PROCEDURE — 1180000000 HC REHAB R&B

## 2025-07-02 PROCEDURE — 97542 WHEELCHAIR MNGMENT TRAINING: CPT

## 2025-07-02 RX ADMIN — CLOPIDOGREL BISULFATE 75 MG: 75 TABLET, FILM COATED ORAL at 08:30

## 2025-07-02 RX ADMIN — ACETAMINOPHEN 650 MG: 325 TABLET ORAL at 06:30

## 2025-07-02 RX ADMIN — SERTRALINE 25 MG: 25 TABLET, FILM COATED ORAL at 08:30

## 2025-07-02 RX ADMIN — ENOXAPARIN SODIUM 40 MG: 100 INJECTION SUBCUTANEOUS at 08:36

## 2025-07-02 RX ADMIN — CLONIDINE HYDROCHLORIDE 0.2 MG: 0.2 TABLET ORAL at 08:38

## 2025-07-02 RX ADMIN — OXYCODONE AND ACETAMINOPHEN 1 TABLET: 7.5; 325 TABLET ORAL at 15:06

## 2025-07-02 RX ADMIN — NYSTATIN 500000 UNITS: 100000 SUSPENSION ORAL at 14:59

## 2025-07-02 RX ADMIN — NYSTATIN 500000 UNITS: 100000 SUSPENSION ORAL at 20:34

## 2025-07-02 RX ADMIN — ATORVASTATIN CALCIUM 80 MG: 80 TABLET, FILM COATED ORAL at 20:33

## 2025-07-02 RX ADMIN — NYSTATIN 500000 UNITS: 100000 SUSPENSION ORAL at 16:56

## 2025-07-02 RX ADMIN — PANTOPRAZOLE SODIUM 40 MG: 40 TABLET, DELAYED RELEASE ORAL at 06:30

## 2025-07-02 RX ADMIN — NYSTATIN 500000 UNITS: 100000 SUSPENSION ORAL at 08:32

## 2025-07-02 RX ADMIN — Medication 100 MG: at 08:29

## 2025-07-02 RX ADMIN — AMLODIPINE BESYLATE 10 MG: 10 TABLET ORAL at 08:32

## 2025-07-02 RX ADMIN — SUCRALFATE 1 G: 1 TABLET ORAL at 06:30

## 2025-07-02 RX ADMIN — Medication 2000 UNITS: at 16:56

## 2025-07-02 RX ADMIN — CLONIDINE HYDROCHLORIDE 0.2 MG: 0.2 TABLET ORAL at 20:33

## 2025-07-02 RX ADMIN — ANTACID TABLETS 500 MG: 500 TABLET, CHEWABLE ORAL at 08:30

## 2025-07-02 RX ADMIN — CLONIDINE HYDROCHLORIDE 0.2 MG: 0.2 TABLET ORAL at 14:59

## 2025-07-02 RX ADMIN — OXYCODONE AND ACETAMINOPHEN 1 TABLET: 7.5; 325 TABLET ORAL at 08:42

## 2025-07-02 RX ADMIN — OXYCODONE AND ACETAMINOPHEN 1 TABLET: 7.5; 325 TABLET ORAL at 20:34

## 2025-07-02 RX ADMIN — Medication 5 MG: at 20:34

## 2025-07-02 ASSESSMENT — PAIN DESCRIPTION - LOCATION
LOCATION: ARM

## 2025-07-02 ASSESSMENT — PAIN DESCRIPTION - ORIENTATION
ORIENTATION: LEFT
ORIENTATION: RIGHT
ORIENTATION: LEFT

## 2025-07-02 ASSESSMENT — PAIN SCALES - GENERAL
PAINLEVEL_OUTOF10: 5
PAINLEVEL_OUTOF10: 1
PAINLEVEL_OUTOF10: 2
PAINLEVEL_OUTOF10: 1
PAINLEVEL_OUTOF10: 1
PAINLEVEL_OUTOF10: 2

## 2025-07-02 ASSESSMENT — PAIN DESCRIPTION - DESCRIPTORS
DESCRIPTORS: THROBBING
DESCRIPTORS: ACHING

## 2025-07-02 ASSESSMENT — PAIN - FUNCTIONAL ASSESSMENT: PAIN_FUNCTIONAL_ASSESSMENT: ACTIVITIES ARE NOT PREVENTED

## 2025-07-03 LAB
ANION GAP SERPL CALCULATED.3IONS-SCNC: 8 MEQ/L (ref 9–15)
BASOPHILS # BLD: 0.1 K/UL (ref 0–0.2)
BASOPHILS NFR BLD: 0.6 %
BUN SERPL-MCNC: 11 MG/DL (ref 6–20)
CALCIUM SERPL-MCNC: 9.2 MG/DL (ref 8.5–9.9)
CHLORIDE SERPL-SCNC: 99 MEQ/L (ref 95–107)
CO2 SERPL-SCNC: 28 MEQ/L (ref 20–31)
CREAT SERPL-MCNC: 0.54 MG/DL (ref 0.5–0.9)
EOSINOPHIL # BLD: 0.2 K/UL (ref 0–0.7)
EOSINOPHIL NFR BLD: 2.8 %
ERYTHROCYTE [DISTWIDTH] IN BLOOD BY AUTOMATED COUNT: 12.3 % (ref 11.5–14.5)
GLUCOSE SERPL-MCNC: 107 MG/DL (ref 70–99)
HCT VFR BLD AUTO: 39.6 % (ref 37–47)
HGB BLD-MCNC: 13.5 G/DL (ref 12–16)
LYMPHOCYTES # BLD: 2.5 K/UL (ref 1–4.8)
LYMPHOCYTES NFR BLD: 31.2 %
MCH RBC QN AUTO: 31 PG (ref 27–31.3)
MCHC RBC AUTO-ENTMCNC: 34.1 % (ref 33–37)
MCV RBC AUTO: 91 FL (ref 79.4–94.8)
MONOCYTES # BLD: 0.9 K/UL (ref 0.2–0.8)
MONOCYTES NFR BLD: 10.9 %
NEUTROPHILS # BLD: 4.4 K/UL (ref 1.4–6.5)
NEUTS SEG NFR BLD: 53.8 %
PLATELET # BLD AUTO: 392 K/UL (ref 130–400)
POTASSIUM SERPL-SCNC: 4.2 MEQ/L (ref 3.4–4.9)
RBC # BLD AUTO: 4.35 M/UL (ref 4.2–5.4)
SODIUM SERPL-SCNC: 135 MEQ/L (ref 135–144)
WBC # BLD AUTO: 8.1 K/UL (ref 4.8–10.8)

## 2025-07-03 PROCEDURE — 1180000000 HC REHAB R&B

## 2025-07-03 PROCEDURE — 6370000000 HC RX 637 (ALT 250 FOR IP): Performed by: INTERNAL MEDICINE

## 2025-07-03 PROCEDURE — 85025 COMPLETE CBC W/AUTO DIFF WBC: CPT

## 2025-07-03 PROCEDURE — 99233 SBSQ HOSP IP/OBS HIGH 50: CPT | Performed by: PHYSICAL MEDICINE & REHABILITATION

## 2025-07-03 PROCEDURE — 97110 THERAPEUTIC EXERCISES: CPT

## 2025-07-03 PROCEDURE — 97112 NEUROMUSCULAR REEDUCATION: CPT

## 2025-07-03 PROCEDURE — 97129 THER IVNTJ 1ST 15 MIN: CPT

## 2025-07-03 PROCEDURE — 6360000002 HC RX W HCPCS: Performed by: INTERNAL MEDICINE

## 2025-07-03 PROCEDURE — 6370000000 HC RX 637 (ALT 250 FOR IP): Performed by: PHYSICAL MEDICINE & REHABILITATION

## 2025-07-03 PROCEDURE — 97130 THER IVNTJ EA ADDL 15 MIN: CPT

## 2025-07-03 PROCEDURE — 36415 COLL VENOUS BLD VENIPUNCTURE: CPT

## 2025-07-03 PROCEDURE — 6370000000 HC RX 637 (ALT 250 FOR IP): Performed by: PSYCHIATRY & NEUROLOGY

## 2025-07-03 PROCEDURE — 97535 SELF CARE MNGMENT TRAINING: CPT

## 2025-07-03 PROCEDURE — 80048 BASIC METABOLIC PNL TOTAL CA: CPT

## 2025-07-03 PROCEDURE — 97116 GAIT TRAINING THERAPY: CPT

## 2025-07-03 RX ORDER — HYDROCHLOROTHIAZIDE 25 MG/1
25 TABLET ORAL DAILY
Status: DISCONTINUED | OUTPATIENT
Start: 2025-07-03 | End: 2025-07-16 | Stop reason: HOSPADM

## 2025-07-03 RX ORDER — OXYCODONE HCL 10 MG/1
10 TABLET, FILM COATED, EXTENDED RELEASE ORAL EVERY 12 HOURS SCHEDULED
Refills: 0 | Status: DISCONTINUED | OUTPATIENT
Start: 2025-07-03 | End: 2025-07-04

## 2025-07-03 RX ADMIN — OXYCODONE AND ACETAMINOPHEN 1 TABLET: 7.5; 325 TABLET ORAL at 17:04

## 2025-07-03 RX ADMIN — ENOXAPARIN SODIUM 40 MG: 100 INJECTION SUBCUTANEOUS at 08:24

## 2025-07-03 RX ADMIN — Medication 100 MG: at 08:25

## 2025-07-03 RX ADMIN — OXYCODONE HYDROCHLORIDE 10 MG: 10 TABLET, FILM COATED, EXTENDED RELEASE ORAL at 21:30

## 2025-07-03 RX ADMIN — OXYCODONE AND ACETAMINOPHEN 1 TABLET: 7.5; 325 TABLET ORAL at 08:26

## 2025-07-03 RX ADMIN — HYDROCHLOROTHIAZIDE 25 MG: 25 TABLET ORAL at 17:01

## 2025-07-03 RX ADMIN — Medication 5 MG: at 21:30

## 2025-07-03 RX ADMIN — ANTACID TABLETS 500 MG: 500 TABLET, CHEWABLE ORAL at 08:26

## 2025-07-03 RX ADMIN — CLONIDINE HYDROCHLORIDE 0.2 MG: 0.2 TABLET ORAL at 17:01

## 2025-07-03 RX ADMIN — Medication 2000 UNITS: at 17:01

## 2025-07-03 RX ADMIN — NYSTATIN 500000 UNITS: 100000 SUSPENSION ORAL at 21:30

## 2025-07-03 RX ADMIN — CLOPIDOGREL BISULFATE 75 MG: 75 TABLET, FILM COATED ORAL at 08:24

## 2025-07-03 RX ADMIN — NYSTATIN 500000 UNITS: 100000 SUSPENSION ORAL at 17:05

## 2025-07-03 RX ADMIN — PANTOPRAZOLE SODIUM 40 MG: 40 TABLET, DELAYED RELEASE ORAL at 06:03

## 2025-07-03 RX ADMIN — SERTRALINE 25 MG: 25 TABLET, FILM COATED ORAL at 08:27

## 2025-07-03 RX ADMIN — AMLODIPINE BESYLATE 10 MG: 10 TABLET ORAL at 08:25

## 2025-07-03 RX ADMIN — ATORVASTATIN CALCIUM 80 MG: 80 TABLET, FILM COATED ORAL at 21:30

## 2025-07-03 RX ADMIN — NYSTATIN 500000 UNITS: 100000 SUSPENSION ORAL at 08:26

## 2025-07-03 RX ADMIN — CLONIDINE HYDROCHLORIDE 0.2 MG: 0.2 TABLET ORAL at 21:30

## 2025-07-03 RX ADMIN — CLONIDINE HYDROCHLORIDE 0.2 MG: 0.2 TABLET ORAL at 08:25

## 2025-07-03 ASSESSMENT — PAIN DESCRIPTION - ORIENTATION
ORIENTATION: LEFT

## 2025-07-03 ASSESSMENT — PAIN SCALES - GENERAL
PAINLEVEL_OUTOF10: 6

## 2025-07-03 ASSESSMENT — PAIN DESCRIPTION - LOCATION
LOCATION: ARM

## 2025-07-03 ASSESSMENT — PAIN DESCRIPTION - DESCRIPTORS
DESCRIPTORS: ACHING
DESCRIPTORS: ACHING;DISCOMFORT
DESCRIPTORS: ACHING

## 2025-07-04 ENCOUNTER — APPOINTMENT (OUTPATIENT)
Dept: GENERAL RADIOLOGY | Age: 50
DRG: 057 | End: 2025-07-04
Attending: PHYSICAL MEDICINE & REHABILITATION
Payer: MEDICAID

## 2025-07-04 ENCOUNTER — APPOINTMENT (OUTPATIENT)
Dept: CT IMAGING | Age: 50
DRG: 057 | End: 2025-07-04
Attending: PHYSICAL MEDICINE & REHABILITATION
Payer: MEDICAID

## 2025-07-04 PROBLEM — R14.0 ABDOMINAL DISTENTION: Status: ACTIVE | Noted: 2025-07-04

## 2025-07-04 PROCEDURE — 6370000000 HC RX 637 (ALT 250 FOR IP): Performed by: INTERNAL MEDICINE

## 2025-07-04 PROCEDURE — 6360000002 HC RX W HCPCS: Performed by: INTERNAL MEDICINE

## 2025-07-04 PROCEDURE — 97110 THERAPEUTIC EXERCISES: CPT

## 2025-07-04 PROCEDURE — 1180000000 HC REHAB R&B

## 2025-07-04 PROCEDURE — 6360000002 HC RX W HCPCS: Performed by: PHYSICAL MEDICINE & REHABILITATION

## 2025-07-04 PROCEDURE — 2580000003 HC RX 258: Performed by: PHYSICAL MEDICINE & REHABILITATION

## 2025-07-04 PROCEDURE — 6360000004 HC RX CONTRAST MEDICATION: Performed by: COLON & RECTAL SURGERY

## 2025-07-04 PROCEDURE — 99221 1ST HOSP IP/OBS SF/LOW 40: CPT | Performed by: COLON & RECTAL SURGERY

## 2025-07-04 PROCEDURE — 6370000000 HC RX 637 (ALT 250 FOR IP): Performed by: PSYCHIATRY & NEUROLOGY

## 2025-07-04 PROCEDURE — 99233 SBSQ HOSP IP/OBS HIGH 50: CPT | Performed by: PHYSICAL MEDICINE & REHABILITATION

## 2025-07-04 PROCEDURE — 6370000000 HC RX 637 (ALT 250 FOR IP): Performed by: PHYSICAL MEDICINE & REHABILITATION

## 2025-07-04 PROCEDURE — 97535 SELF CARE MNGMENT TRAINING: CPT

## 2025-07-04 PROCEDURE — 97112 NEUROMUSCULAR REEDUCATION: CPT

## 2025-07-04 PROCEDURE — 74177 CT ABD & PELVIS W/CONTRAST: CPT

## 2025-07-04 PROCEDURE — 74018 RADEX ABDOMEN 1 VIEW: CPT

## 2025-07-04 PROCEDURE — 97530 THERAPEUTIC ACTIVITIES: CPT

## 2025-07-04 RX ORDER — LACTULOSE 10 G/15ML
20 SOLUTION ORAL 2 TIMES DAILY
Status: DISCONTINUED | OUTPATIENT
Start: 2025-07-04 | End: 2025-07-05

## 2025-07-04 RX ORDER — ONDANSETRON 4 MG/1
4 TABLET, FILM COATED ORAL ONCE
Status: COMPLETED | OUTPATIENT
Start: 2025-07-04 | End: 2025-07-04

## 2025-07-04 RX ORDER — IOPAMIDOL 755 MG/ML
75 INJECTION, SOLUTION INTRAVASCULAR
Status: COMPLETED | OUTPATIENT
Start: 2025-07-04 | End: 2025-07-04

## 2025-07-04 RX ORDER — SODIUM CHLORIDE 9 MG/ML
INJECTION, SOLUTION INTRAVENOUS CONTINUOUS
Status: DISPENSED | OUTPATIENT
Start: 2025-07-04 | End: 2025-07-04

## 2025-07-04 RX ADMIN — CLONIDINE HYDROCHLORIDE 0.2 MG: 0.2 TABLET ORAL at 18:00

## 2025-07-04 RX ADMIN — ACETAMINOPHEN 650 MG: 325 TABLET ORAL at 18:32

## 2025-07-04 RX ADMIN — CLONIDINE HYDROCHLORIDE 0.2 MG: 0.2 TABLET ORAL at 20:59

## 2025-07-04 RX ADMIN — DEXAMETHASONE SODIUM PHOSPHATE 8 MG: 4 INJECTION, SOLUTION INTRAMUSCULAR; INTRAVENOUS at 12:20

## 2025-07-04 RX ADMIN — NYSTATIN 500000 UNITS: 100000 SUSPENSION ORAL at 18:01

## 2025-07-04 RX ADMIN — HYDROCHLOROTHIAZIDE 25 MG: 25 TABLET ORAL at 13:22

## 2025-07-04 RX ADMIN — ANTACID TABLETS 500 MG: 500 TABLET, CHEWABLE ORAL at 08:47

## 2025-07-04 RX ADMIN — LACTULOSE 20 G: 10 SOLUTION ORAL at 21:00

## 2025-07-04 RX ADMIN — Medication 100 MG: at 13:23

## 2025-07-04 RX ADMIN — ONDANSETRON HYDROCHLORIDE 4 MG: 4 TABLET, FILM COATED ORAL at 09:56

## 2025-07-04 RX ADMIN — CLOPIDOGREL BISULFATE 75 MG: 75 TABLET, FILM COATED ORAL at 13:22

## 2025-07-04 RX ADMIN — SERTRALINE 25 MG: 25 TABLET, FILM COATED ORAL at 13:22

## 2025-07-04 RX ADMIN — NYSTATIN 500000 UNITS: 100000 SUSPENSION ORAL at 13:31

## 2025-07-04 RX ADMIN — ATORVASTATIN CALCIUM 80 MG: 80 TABLET, FILM COATED ORAL at 20:59

## 2025-07-04 RX ADMIN — CLONIDINE HYDROCHLORIDE 0.2 MG: 0.2 TABLET ORAL at 13:22

## 2025-07-04 RX ADMIN — SODIUM CHLORIDE: 0.9 INJECTION, SOLUTION INTRAVENOUS at 12:18

## 2025-07-04 RX ADMIN — PANTOPRAZOLE SODIUM 40 MG: 40 TABLET, DELAYED RELEASE ORAL at 06:32

## 2025-07-04 RX ADMIN — NYSTATIN 500000 UNITS: 100000 SUSPENSION ORAL at 20:59

## 2025-07-04 RX ADMIN — ENOXAPARIN SODIUM 40 MG: 100 INJECTION SUBCUTANEOUS at 13:25

## 2025-07-04 RX ADMIN — IOPAMIDOL 75 ML: 755 INJECTION, SOLUTION INTRAVENOUS at 19:38

## 2025-07-04 RX ADMIN — Medication 2000 UNITS: at 18:00

## 2025-07-04 RX ADMIN — AMLODIPINE BESYLATE 10 MG: 10 TABLET ORAL at 13:23

## 2025-07-04 RX ADMIN — ANTACID TABLETS 500 MG: 500 TABLET, CHEWABLE ORAL at 20:58

## 2025-07-04 RX ADMIN — Medication 5 MG: at 20:58

## 2025-07-04 ASSESSMENT — PAIN SCALES - GENERAL
PAINLEVEL_OUTOF10: 3
PAINLEVEL_OUTOF10: 6

## 2025-07-04 ASSESSMENT — PAIN DESCRIPTION - ORIENTATION: ORIENTATION: LEFT

## 2025-07-04 ASSESSMENT — PAIN DESCRIPTION - LOCATION: LOCATION: ARM

## 2025-07-04 ASSESSMENT — PAIN DESCRIPTION - DESCRIPTORS: DESCRIPTORS: ACHING

## 2025-07-04 NOTE — FLOWSHEET NOTE
Patient had a small emesis this AM that consisted of yellow bile. Patients gown and sheet changed per Janette JOSEPH.

## 2025-07-05 PROCEDURE — 6370000000 HC RX 637 (ALT 250 FOR IP): Performed by: INTERNAL MEDICINE

## 2025-07-05 PROCEDURE — 97112 NEUROMUSCULAR REEDUCATION: CPT

## 2025-07-05 PROCEDURE — 99233 SBSQ HOSP IP/OBS HIGH 50: CPT | Performed by: PHYSICAL MEDICINE & REHABILITATION

## 2025-07-05 PROCEDURE — 6370000000 HC RX 637 (ALT 250 FOR IP): Performed by: PSYCHIATRY & NEUROLOGY

## 2025-07-05 PROCEDURE — 6370000000 HC RX 637 (ALT 250 FOR IP): Performed by: PHYSICAL MEDICINE & REHABILITATION

## 2025-07-05 PROCEDURE — 6360000002 HC RX W HCPCS: Performed by: INTERNAL MEDICINE

## 2025-07-05 PROCEDURE — 97535 SELF CARE MNGMENT TRAINING: CPT

## 2025-07-05 PROCEDURE — 1180000000 HC REHAB R&B

## 2025-07-05 RX ORDER — LANOLIN ALCOHOL/MO/W.PET/CERES
400 CREAM (GRAM) TOPICAL DAILY
Status: DISCONTINUED | OUTPATIENT
Start: 2025-07-05 | End: 2025-07-16 | Stop reason: HOSPADM

## 2025-07-05 RX ORDER — LACTULOSE 10 G/15ML
30 SOLUTION ORAL 2 TIMES DAILY
Status: DISCONTINUED | OUTPATIENT
Start: 2025-07-05 | End: 2025-07-06

## 2025-07-05 RX ADMIN — NYSTATIN 500000 UNITS: 100000 SUSPENSION ORAL at 12:02

## 2025-07-05 RX ADMIN — AMLODIPINE BESYLATE 10 MG: 10 TABLET ORAL at 08:23

## 2025-07-05 RX ADMIN — NYSTATIN 500000 UNITS: 100000 SUSPENSION ORAL at 22:05

## 2025-07-05 RX ADMIN — ANTACID TABLETS 500 MG: 500 TABLET, CHEWABLE ORAL at 08:23

## 2025-07-05 RX ADMIN — Medication 100 MG: at 08:23

## 2025-07-05 RX ADMIN — CLONIDINE HYDROCHLORIDE 0.2 MG: 0.2 TABLET ORAL at 08:23

## 2025-07-05 RX ADMIN — ACETAMINOPHEN 650 MG: 325 TABLET ORAL at 17:54

## 2025-07-05 RX ADMIN — ATORVASTATIN CALCIUM 80 MG: 80 TABLET, FILM COATED ORAL at 22:05

## 2025-07-05 RX ADMIN — SERTRALINE 25 MG: 25 TABLET, FILM COATED ORAL at 08:23

## 2025-07-05 RX ADMIN — LACTULOSE 20 G: 10 SOLUTION ORAL at 08:23

## 2025-07-05 RX ADMIN — HYDROCHLOROTHIAZIDE 25 MG: 25 TABLET ORAL at 08:23

## 2025-07-05 RX ADMIN — ENOXAPARIN SODIUM 40 MG: 100 INJECTION SUBCUTANEOUS at 08:22

## 2025-07-05 RX ADMIN — CLONIDINE HYDROCHLORIDE 0.2 MG: 0.2 TABLET ORAL at 12:02

## 2025-07-05 RX ADMIN — NYSTATIN 500000 UNITS: 100000 SUSPENSION ORAL at 08:23

## 2025-07-05 RX ADMIN — CLOPIDOGREL BISULFATE 75 MG: 75 TABLET, FILM COATED ORAL at 08:23

## 2025-07-05 RX ADMIN — ACETAMINOPHEN 650 MG: 325 TABLET ORAL at 22:05

## 2025-07-05 RX ADMIN — ACETAMINOPHEN 650 MG: 325 TABLET ORAL at 12:07

## 2025-07-05 RX ADMIN — PANTOPRAZOLE SODIUM 40 MG: 40 TABLET, DELAYED RELEASE ORAL at 05:40

## 2025-07-05 RX ADMIN — Medication 5 MG: at 22:06

## 2025-07-05 RX ADMIN — MAJOR MAGNESIUM CITRATE ORAL SOLUTION - LEMON 150 ML: 1.75 LIQUID ORAL at 12:01

## 2025-07-05 RX ADMIN — Medication 2000 UNITS: at 17:54

## 2025-07-05 RX ADMIN — Medication 400 MG: at 12:02

## 2025-07-05 RX ADMIN — NYSTATIN 500000 UNITS: 100000 SUSPENSION ORAL at 17:54

## 2025-07-05 RX ADMIN — CLONIDINE HYDROCHLORIDE 0.2 MG: 0.2 TABLET ORAL at 22:06

## 2025-07-05 ASSESSMENT — PAIN SCALES - GENERAL: PAINLEVEL_OUTOF10: 2

## 2025-07-05 ASSESSMENT — PAIN DESCRIPTION - DESCRIPTORS: DESCRIPTORS: ACHING;DISCOMFORT

## 2025-07-05 ASSESSMENT — PAIN DESCRIPTION - ORIENTATION: ORIENTATION: LEFT

## 2025-07-05 ASSESSMENT — PAIN DESCRIPTION - LOCATION: LOCATION: ARM

## 2025-07-05 NOTE — FLOWSHEET NOTE
Assumed care of patient from Anuradha BETTS. Patient is resting in bed with her eyes closed. Patients bed alarm is engaged and call light is within reach.

## 2025-07-06 PROCEDURE — 6370000000 HC RX 637 (ALT 250 FOR IP): Performed by: PHYSICAL MEDICINE & REHABILITATION

## 2025-07-06 PROCEDURE — 6370000000 HC RX 637 (ALT 250 FOR IP): Performed by: INTERNAL MEDICINE

## 2025-07-06 PROCEDURE — 99232 SBSQ HOSP IP/OBS MODERATE 35: CPT | Performed by: PHYSICAL MEDICINE & REHABILITATION

## 2025-07-06 PROCEDURE — 6360000002 HC RX W HCPCS: Performed by: INTERNAL MEDICINE

## 2025-07-06 PROCEDURE — 6370000000 HC RX 637 (ALT 250 FOR IP): Performed by: PSYCHIATRY & NEUROLOGY

## 2025-07-06 PROCEDURE — 1180000000 HC REHAB R&B

## 2025-07-06 RX ORDER — LACTULOSE 10 G/15ML
30 SOLUTION ORAL DAILY
Status: DISCONTINUED | OUTPATIENT
Start: 2025-07-07 | End: 2025-07-07

## 2025-07-06 RX ORDER — HYDROCORTISONE 25 MG/G
CREAM TOPICAL 2 TIMES DAILY
Status: DISCONTINUED | OUTPATIENT
Start: 2025-07-06 | End: 2025-07-09

## 2025-07-06 RX ADMIN — PANTOPRAZOLE SODIUM 40 MG: 40 TABLET, DELAYED RELEASE ORAL at 05:29

## 2025-07-06 RX ADMIN — NYSTATIN 500000 UNITS: 100000 SUSPENSION ORAL at 16:56

## 2025-07-06 RX ADMIN — Medication 2000 UNITS: at 16:56

## 2025-07-06 RX ADMIN — ANTACID TABLETS 500 MG: 500 TABLET, CHEWABLE ORAL at 20:53

## 2025-07-06 RX ADMIN — ATORVASTATIN CALCIUM 80 MG: 80 TABLET, FILM COATED ORAL at 20:53

## 2025-07-06 RX ADMIN — CLONIDINE HYDROCHLORIDE 0.2 MG: 0.2 TABLET ORAL at 20:53

## 2025-07-06 RX ADMIN — NYSTATIN 500000 UNITS: 100000 SUSPENSION ORAL at 20:53

## 2025-07-06 RX ADMIN — NYSTATIN 500000 UNITS: 100000 SUSPENSION ORAL at 09:15

## 2025-07-06 RX ADMIN — CLONIDINE HYDROCHLORIDE 0.2 MG: 0.2 TABLET ORAL at 14:19

## 2025-07-06 RX ADMIN — ACETAMINOPHEN 650 MG: 325 TABLET ORAL at 12:09

## 2025-07-06 RX ADMIN — Medication 100 MG: at 09:16

## 2025-07-06 RX ADMIN — NYSTATIN 500000 UNITS: 100000 SUSPENSION ORAL at 14:19

## 2025-07-06 RX ADMIN — CLOPIDOGREL BISULFATE 75 MG: 75 TABLET, FILM COATED ORAL at 09:17

## 2025-07-06 RX ADMIN — HYDROCHLOROTHIAZIDE 25 MG: 25 TABLET ORAL at 09:17

## 2025-07-06 RX ADMIN — Medication 400 MG: at 09:17

## 2025-07-06 RX ADMIN — ANTACID TABLETS 500 MG: 500 TABLET, CHEWABLE ORAL at 09:16

## 2025-07-06 RX ADMIN — CLONIDINE HYDROCHLORIDE 0.2 MG: 0.2 TABLET ORAL at 09:17

## 2025-07-06 RX ADMIN — Medication 5 MG: at 20:53

## 2025-07-06 RX ADMIN — HYDROCORTISONE: 25 CREAM TOPICAL at 11:04

## 2025-07-06 RX ADMIN — ENOXAPARIN SODIUM 40 MG: 100 INJECTION SUBCUTANEOUS at 09:16

## 2025-07-06 RX ADMIN — SERTRALINE 25 MG: 25 TABLET, FILM COATED ORAL at 09:17

## 2025-07-06 RX ADMIN — AMLODIPINE BESYLATE 10 MG: 10 TABLET ORAL at 09:17

## 2025-07-06 ASSESSMENT — PAIN DESCRIPTION - LOCATION: LOCATION: ARM;SHOULDER

## 2025-07-06 ASSESSMENT — PAIN SCALES - GENERAL
PAINLEVEL_OUTOF10: 7
PAINLEVEL_OUTOF10: 0

## 2025-07-06 ASSESSMENT — PAIN DESCRIPTION - ORIENTATION: ORIENTATION: LEFT

## 2025-07-06 NOTE — FLOWSHEET NOTE
Patient resting in bed with some complaints of left arm pain rated 2/10. Patient medicated with Tylenol 650 MG PO PRN for left arm pain with the rest of her evening medications. Patient took her medications whole at once with sips of water without difficulty. Patient had a large BMx2 on the toilet prior to shift change. Day shift RN Jimmy states patient was assisted on and off the toilet with a tremaine steady and two assist. Patient requested a purewick in place with a depends for incontinent episodes. Patient declined an HS snack. Patient requested the IV be removed from her right hand. IV removed and catheter is intact. Placed a band aid to patients right hand. Patient repositioned in bed with several pillows in place. Patient states she is comfortable and verbalized no further needs at this time. Patients bed alarm is engaged and call light is within reach.

## 2025-07-06 NOTE — CONSULTS
Consult      Patient:  Smooth Easley  YOB: 1975    MRN: [id number]     Acct: [id number]    Primary Care Physician: Diana Gutierrez PA    HISTORY OF PRESENT ILLNESS:   History obtained from chart review and the patient.    The patient is a 49 y.o. female whom I have been requested to see by Dr. Ramirez for evaluation of medical management. Patient was admitted to [facility] with Acute right MCA stroke with left neglect, right-sided gaze, left-sided facial droop, left-sided weakness, was evaluated by PT/neurology and ASA/plavix/statin were initiated. Also was found to have L adrenal/liver mass- was evaluated by oncology service.  After completing her acute stay patient was transferred to [facility] acute rehab service       Past Medical History:  History reviewed. No pertinent past medical history.    Past Surgical History:        Procedure Laterality Date    CHOLECYSTECTOMY         Medications:    No current facility-administered medications on file prior to encounter.     Current Outpatient Medications on File Prior to Encounter   Medication Sig Dispense Refill    cloNIDine (CATAPRES) 0.1 MG tablet Take 1 tablet by mouth 2 times daily As needed for b/p > 150/ 90 60 tablet 0       Allergies:  Aspirin    Social History:    reports that she has been smoking cigarettes. She has never used smokeless tobacco. She reports that she does not drink alcohol.    Occupation:     Family History:   History reviewed. No pertinent family history.    Review of systems:  Constitutional: no fever, no night sweats, no fatigue  Head: no headache, no head injury, no migranes.  Eye: no blurring of vision, no double vision.  Ears: no hearing difficulty, no tinnitus  Mouth/throat: no ulceration, dental caries, dysphagia  Lungs: no cough, no shortness of breath, no wheeze  CVS: no palpitation, no chest pain, no shortness of breath  GI: no abdominal pain, no nausea , no vomiting, no constipation  MARY JO: no dysuria, 
    Vascular Consult      Name: Smooth Easley  MRN: [id number]       Physician Requesting Consult:  Dr. Lal    Reason for Consult:   Right common iliac artery stenosis    Chief Complaint:     CVA    History of Present Illness:      Smooth Easley is a 49 y.o.  female who presents to the ER on Ashli 10, 2025 for chest pain radiating to the right hand.  She was also found to have left upper and left lower extremity weakness with left-sided neglect and left facial droop.  She was admitted to the hospital due to acute CVA and was then discharged to acute rehab.    While in acute rehab vascular surgery was consulted due to finding of right common iliac artery stenosis on CT of her abdomen pelvis.  Patient denies history consistent with vascular claudication.  No history of rest pain.  No tissue loss noted    Past Medical History:     Past Medical History:   Diagnosis Date    Lumbosacral disc disease     Osteoarthritis         Past Surgical History:     Past Surgical History:   Procedure Laterality Date    CHOLECYSTECTOMY          Medications Prior to Admission:       Prior to Admission medications    Medication Sig Start Date End Date Taking? Authorizing Provider   cloNIDine (CATAPRES) 0.1 MG tablet Take 1 tablet by mouth 2 times daily As needed for b/p > 150/ 90 9/23/22  Yes Diana Gutierrez PA        Allergies:       Aspirin    Social History:     Tobacco:    reports that she has been smoking cigarettes. She has never used smokeless tobacco.  Alcohol:      reports no history of alcohol use.  Drug Use:  reports no history of drug use.    Family History:     History reviewed. No pertinent family history.    Review of Systems:     Positive and Negative as described in HPI    Constitutional:  negative for  fevers, chills, sweats, fatigue, and weight loss  HEENT:  negative for vision or hearing changes,   Respiratory:  negative for shortness of breath, cough, or congestion  Cardiovascular:  negative for  chest pain, 
  [facility] Consult Note  Name: Smooth Easley  Age: 49 y.o.  Gender: female  CodeStatus: Full Code  Allergies: Aspirin    Chief Complaint:No chief complaint on file.    Primary Care Provider: Diana Gutierrez PA  InpatientTreatment Team: Treatment Team:   Sameera Ramirez DO Patel, MD Otoniel Fernandes, Natalia Rogers, APRN - CNP  Deshaun Lawson, Sai Agustin MD Medina, Julianna, MSW, LSW  Aziza Middleton, Simi Betts, OTR/L  Masha Prado, HANDY  Jose Angel, Mauro, OT  Mary Jacobo, Regulo Corado  Admission Date: 6/14/2025      HPI   Consulting Provider: Dr. Sai Cardona for CVA  Pt seen and examined on rehab for neurology consult.  Patient is a 49-year-old female with past medical history of tobacco use, hypertension who presented to [facility] emergency room on 6/10/2025 for chest pain with tingling in the right hand that began at 7:30 in the morning.  H&P notes that on exam patient had left pronator drift, left upper and lower extremity weakness and lack of coordination, left-sided neglect and left facial droop.  Vital signs in the emergency room 217/87, 87, 18, 98.3 °F, 97%.  EKG showed normal sinus rhythm.  CT of the head negative for acute intracranial findings.    CTA of the head and neck shows occlusion of the M1 segments of both MCAs and of the A1 segment of the right SAEED.    Lab testing largely unremarkable.     Patient had worsening of NIH to 11 while in the ED.  Stat repeat CT of the head was obtained and negative.     MRI of the brain was completed and showed large nonhemorrhagic acute anterior right MCA infarct without mass effect or midline shift.    Hemoglobin A1c 5.5    Patient was started on Plavix.  Aspirin allergy noted.  Hypercoagulable workup negative thus far  Vitals:    06/16/25 0806   BP: (!) 165/73   Pulse: 74   Resp: 18   Temp: 97.9 °F (36.6 °C)   SpO2: 98%        Review of Systems   Constitutional:  Negative for appetite change, 
[facility] Department of Psychiatry  Behavioral Health Consult    REASON FOR CONSULT: Depression    CONSULTING PHYSICIAN: DR Ramirez    History obtained from: Patient    HISTORY OF PRESENT ILLNESS:      The patient is a 49 y.o. 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        Substance Abuse History:  ETOH: no  Marijuana: no  Opiates: no  Other Drugs: no      Past Psychiatric History:  History of depression    Past Medical History:        Diagnosis Date    Osteoarthritis        Past Surgical History:        Procedure Laterality Date    CHOLECYSTECTOMY         Medications Prior to Admission:   Medications Prior to Admission: cloNIDine (CATAPRES) 0.1 MG tablet, Take 1 tablet by mouth 2 times daily As needed for b/p > 150/ 90    Allergies:  Aspirin    FAMILY/SOCIAL HISTORY:  History reviewed. No pertinent family history.  Social History     Socioeconomic History    Marital status: Single     Spouse name: Not on file    Number of children: Not on file    Years of education: Not on file    Highest education level: Not on file   Occupational History    Not on file   Tobacco Use    Smoking status: Every Day     Current packs/day: 0.50     Types: Cigarettes    Smokeless tobacco: Never   Substance and Sexual Activity    
Spiritual Health History and Assessment/Progress Note  [facility]    Spiritual/Emotional Needs,  , Adjustment to illness, Life Adjustments,      Name: Smooth Easley MRN: [id number]    Age: 49 y.o.     Sex: female   Language: English   Latter day: Non-[employer]ist   Impaired mobility and activities of daily living     Date: 6/20/2025            Total Time Calculated: 15 min              Spiritual Assessment began in [facility]        Referral/Consult From: Nurse, Rounding   Encounter Overview/Reason: Spiritual/Emotional Needs  Service Provided For: Patient    Pt, resting and relaxing and with daughter present at bed-side. Pt, reports coping, continued progress and improvements of health and wellness.     Pt feels accomplished of reaching her goal in taking one step towards walking again. Pt requested if  could say a prayer for family and for strength in the loss of her son earlier this year.     Pt, affect, determined, confident, hopeful and inspired. Alert, aware, receptive, responsive to chaplains presence, guided words of affirmation, continued healing, wellness, and progress.      reports, spiritual care encounter with patient .     Kallie, Belief, Meaning:   Patient has beliefs or practices that help with coping during difficult times  Family/Friends have beliefs or practices that help with coping during difficult times      Importance and Influence:  Patient has spiritual/personal beliefs that influence decisions regarding their health  Family/Friends have spiritual/personal beliefs that influence decisions regarding the patient's health    Community:  Patient feels well-supported. Support system includes: Children  Family/Friends feel well-supported. Support system includes: Parent/s    Assessment and Plan of Care:     Patient Interventions include: Facilitated expression of thoughts and feelings, Explored spiritual coping/struggle/distress, Engaged in theological reflection, and 
gums normal and good dentition.  NECK:  Supple, symmetrical, trachea midline, no adenopathy, thyroid symmetric, not enlarged and no tenderness, skin normal  HEMATOLOGIC/LYMPHATICS:  no cervical lymphadenopathy  BACK:  Symmetric, no curvature, spinous processes are non-tender on palpation, paraspinous muscles are non-tender on palpation, no costal vertebral tenderness  LUNGS:  No increased work of breathing, good air exchange, clear to auscultation bilaterally, no crackles or wheezing  CARDIOVASCULAR: Normal rate  ABDOMEN: Soft, distended, no tenderness on palpation.  No guard  MUSCULOSKELETAL:  There is no redness, warmth, or swelling of the joints.  Full range of motion noted.  Motor strength is 5 out of 5 all extremities bilaterally.  Tone is normal.  NEUROLOGIC: Awake alert  SKIN:  no bruising or bleeding  DATA:    CBC:   Lab Results   Component Value Date/Time    WBC 8.1 07/03/2025 05:01 AM    RBC 4.35 07/03/2025 05:01 AM    HGB 13.5 07/03/2025 05:01 AM    HCT 39.6 07/03/2025 05:01 AM    MCV 91.0 07/03/2025 05:01 AM    MCH 31.0 07/03/2025 05:01 AM    MCHC 34.1 07/03/2025 05:01 AM    RDW 12.3 07/03/2025 05:01 AM     07/03/2025 05:01 AM     CMP:    Lab Results   Component Value Date/Time     07/03/2025 05:01 AM    K 4.2 07/03/2025 05:01 AM    CL 99 07/03/2025 05:01 AM    CO2 28 07/03/2025 05:01 AM    BUN 11 07/03/2025 05:01 AM    CREATININE 0.54 07/03/2025 05:01 AM    GFRAA >60.0 09/23/2022 11:17 AM    LABGLOM >90.0 07/03/2025 05:01 AM    LABGLOM >60.0 03/05/2023 11:30 AM    GLUCOSE 107 07/03/2025 05:01 AM    CALCIUM 9.2 07/03/2025 05:01 AM    BILITOT 0.6 06/15/2025 05:39 AM    ALKPHOS 77 06/15/2025 05:39 AM    AST 23 06/15/2025 05:39 AM    ALT 35 06/15/2025 05:39 AM     Radiology Review: Abdominal x-ray reviewed    IMPRESSION/RECOMMENDATIONS:      Abdominal distention.  Nausea improved    CAT scan of the abdomen and pelvis ordered.  Suspect an issue such as pseudoobstruction or Schoenchen's given recent

## 2025-07-06 NOTE — PLAN OF CARE
Problem: Safety - Adult  Goal: Free from fall injury  Outcome: Progressing      Skin normal color for race, warm, dry and intact. No evidence of rash.

## 2025-07-07 LAB
ANION GAP SERPL CALCULATED.3IONS-SCNC: 10 MEQ/L (ref 9–15)
BASOPHILS # BLD: 0 K/UL (ref 0–0.2)
BASOPHILS NFR BLD: 0.4 %
BUN SERPL-MCNC: 17 MG/DL (ref 6–20)
CALCIUM SERPL-MCNC: 9.3 MG/DL (ref 8.5–9.9)
CHLORIDE SERPL-SCNC: 99 MEQ/L (ref 95–107)
CO2 SERPL-SCNC: 31 MEQ/L (ref 20–31)
CREAT SERPL-MCNC: 0.68 MG/DL (ref 0.5–0.9)
EOSINOPHIL # BLD: 0.3 K/UL (ref 0–0.7)
EOSINOPHIL NFR BLD: 3.2 %
ERYTHROCYTE [DISTWIDTH] IN BLOOD BY AUTOMATED COUNT: 12.3 % (ref 11.5–14.5)
GLUCOSE SERPL-MCNC: 118 MG/DL (ref 70–99)
HCT VFR BLD AUTO: 41.7 % (ref 37–47)
HGB BLD-MCNC: 14.1 G/DL (ref 12–16)
LYMPHOCYTES # BLD: 2.3 K/UL (ref 1–4.8)
LYMPHOCYTES NFR BLD: 25.8 %
MCH RBC QN AUTO: 30.3 PG (ref 27–31.3)
MCHC RBC AUTO-ENTMCNC: 33.8 % (ref 33–37)
MCV RBC AUTO: 89.5 FL (ref 79.4–94.8)
MONOCYTES # BLD: 0.9 K/UL (ref 0.2–0.8)
MONOCYTES NFR BLD: 10.5 %
NEUTROPHILS # BLD: 5.3 K/UL (ref 1.4–6.5)
NEUTS SEG NFR BLD: 59 %
PLATELET # BLD AUTO: 414 K/UL (ref 130–400)
POTASSIUM SERPL-SCNC: 3.5 MEQ/L (ref 3.4–4.9)
RBC # BLD AUTO: 4.66 M/UL (ref 4.2–5.4)
SODIUM SERPL-SCNC: 140 MEQ/L (ref 135–144)
WBC # BLD AUTO: 9 K/UL (ref 4.8–10.8)

## 2025-07-07 PROCEDURE — 97129 THER IVNTJ 1ST 15 MIN: CPT

## 2025-07-07 PROCEDURE — 6370000000 HC RX 637 (ALT 250 FOR IP): Performed by: PHYSICAL MEDICINE & REHABILITATION

## 2025-07-07 PROCEDURE — 97110 THERAPEUTIC EXERCISES: CPT

## 2025-07-07 PROCEDURE — 80048 BASIC METABOLIC PNL TOTAL CA: CPT

## 2025-07-07 PROCEDURE — 6360000002 HC RX W HCPCS: Performed by: PHYSICAL MEDICINE & REHABILITATION

## 2025-07-07 PROCEDURE — 97130 THER IVNTJ EA ADDL 15 MIN: CPT

## 2025-07-07 PROCEDURE — 6360000002 HC RX W HCPCS: Performed by: INTERNAL MEDICINE

## 2025-07-07 PROCEDURE — 6370000000 HC RX 637 (ALT 250 FOR IP): Performed by: PSYCHIATRY & NEUROLOGY

## 2025-07-07 PROCEDURE — 1180000000 HC REHAB R&B

## 2025-07-07 PROCEDURE — 6370000000 HC RX 637 (ALT 250 FOR IP): Performed by: INTERNAL MEDICINE

## 2025-07-07 PROCEDURE — 97535 SELF CARE MNGMENT TRAINING: CPT

## 2025-07-07 PROCEDURE — 92526 ORAL FUNCTION THERAPY: CPT

## 2025-07-07 PROCEDURE — 97116 GAIT TRAINING THERAPY: CPT

## 2025-07-07 PROCEDURE — 85025 COMPLETE CBC W/AUTO DIFF WBC: CPT

## 2025-07-07 PROCEDURE — 97032 APPL MODALITY 1+ESTIM EA 15: CPT

## 2025-07-07 PROCEDURE — 99233 SBSQ HOSP IP/OBS HIGH 50: CPT | Performed by: PHYSICAL MEDICINE & REHABILITATION

## 2025-07-07 PROCEDURE — 36415 COLL VENOUS BLD VENIPUNCTURE: CPT

## 2025-07-07 PROCEDURE — 97112 NEUROMUSCULAR REEDUCATION: CPT

## 2025-07-07 RX ORDER — LACTULOSE 10 G/15ML
30 SOLUTION ORAL DAILY PRN
Status: DISCONTINUED | OUTPATIENT
Start: 2025-07-07 | End: 2025-07-12

## 2025-07-07 RX ORDER — DOCUSATE SODIUM 100 MG/1
100 CAPSULE, LIQUID FILLED ORAL 2 TIMES DAILY
Status: DISCONTINUED | OUTPATIENT
Start: 2025-07-07 | End: 2025-07-16 | Stop reason: HOSPADM

## 2025-07-07 RX ADMIN — NYSTATIN 500000 UNITS: 100000 SUSPENSION ORAL at 17:01

## 2025-07-07 RX ADMIN — NYSTATIN 500000 UNITS: 100000 SUSPENSION ORAL at 08:34

## 2025-07-07 RX ADMIN — ACETAMINOPHEN 650 MG: 325 TABLET ORAL at 08:53

## 2025-07-07 RX ADMIN — CLOPIDOGREL BISULFATE 75 MG: 75 TABLET, FILM COATED ORAL at 08:33

## 2025-07-07 RX ADMIN — Medication 100 MG: at 08:34

## 2025-07-07 RX ADMIN — CLONIDINE HYDROCHLORIDE 0.2 MG: 0.2 TABLET ORAL at 15:19

## 2025-07-07 RX ADMIN — Medication 400 MG: at 08:34

## 2025-07-07 RX ADMIN — PANTOPRAZOLE SODIUM 40 MG: 40 TABLET, DELAYED RELEASE ORAL at 05:54

## 2025-07-07 RX ADMIN — Medication 5 MG: at 20:36

## 2025-07-07 RX ADMIN — CLONIDINE HYDROCHLORIDE 0.2 MG: 0.2 TABLET ORAL at 08:33

## 2025-07-07 RX ADMIN — HYDROCHLOROTHIAZIDE 25 MG: 25 TABLET ORAL at 08:34

## 2025-07-07 RX ADMIN — ANTACID TABLETS 500 MG: 500 TABLET, CHEWABLE ORAL at 08:33

## 2025-07-07 RX ADMIN — CLONIDINE HYDROCHLORIDE 0.2 MG: 0.2 TABLET ORAL at 20:37

## 2025-07-07 RX ADMIN — AMLODIPINE BESYLATE 10 MG: 10 TABLET ORAL at 08:34

## 2025-07-07 RX ADMIN — ACETAMINOPHEN 650 MG: 325 TABLET ORAL at 15:20

## 2025-07-07 RX ADMIN — SERTRALINE 25 MG: 25 TABLET, FILM COATED ORAL at 08:34

## 2025-07-07 RX ADMIN — CYANOCOBALAMIN 1000 MCG: 1000 INJECTION, SOLUTION INTRAMUSCULAR; SUBCUTANEOUS at 08:36

## 2025-07-07 RX ADMIN — ENOXAPARIN SODIUM 40 MG: 100 INJECTION SUBCUTANEOUS at 08:34

## 2025-07-07 RX ADMIN — ATORVASTATIN CALCIUM 80 MG: 80 TABLET, FILM COATED ORAL at 20:36

## 2025-07-07 RX ADMIN — NYSTATIN 500000 UNITS: 100000 SUSPENSION ORAL at 20:37

## 2025-07-07 RX ADMIN — HYDROCORTISONE: 25 CREAM TOPICAL at 10:40

## 2025-07-07 RX ADMIN — ACETAMINOPHEN 650 MG: 325 TABLET ORAL at 20:36

## 2025-07-07 RX ADMIN — Medication 2000 UNITS: at 17:01

## 2025-07-07 ASSESSMENT — PAIN SCALES - GENERAL
PAINLEVEL_OUTOF10: 6

## 2025-07-07 ASSESSMENT — PAIN DESCRIPTION - LOCATION
LOCATION: ARM
LOCATION: ARM
LOCATION: SHOULDER

## 2025-07-07 ASSESSMENT — PAIN DESCRIPTION - DESCRIPTORS
DESCRIPTORS: THROBBING
DESCRIPTORS: ACHING;DISCOMFORT
DESCRIPTORS: THROBBING

## 2025-07-07 ASSESSMENT — PAIN DESCRIPTION - ORIENTATION
ORIENTATION: LEFT

## 2025-07-07 NOTE — PLAN OF CARE
Problem: Chronic Conditions and Co-morbidities  Goal: Patient's chronic conditions and co-morbidity symptoms are monitored and maintained or improved  7/7/2025 1016 by Carmen Mixon RN  Outcome: Progressing  7/7/2025 0437 by Simi Madrigal RN  Outcome: Progressing     Problem: Discharge Planning  Goal: Discharge to home or other facility with appropriate resources  7/7/2025 1016 by Carmen Mixon RN  Outcome: Progressing  7/7/2025 0437 by Simi Madrigal RN  Outcome: Progressing     Problem: Pain  Goal: Verbalizes/displays adequate comfort level or baseline comfort level  7/7/2025 1016 by Carmen Mixon RN  Outcome: Progressing  7/7/2025 0437 by Simi Madrigal RN  Outcome: Progressing     Problem: Safety - Adult  Goal: Free from fall injury  7/7/2025 1016 by Carmen Mixon RN  Outcome: Progressing  7/7/2025 0437 by Simi Madrigal RN  Outcome: Progressing     Problem: Skin/Tissue Integrity  Goal: Skin integrity remains intact  Description: 1.  Monitor for areas of redness and/or skin breakdown  2.  Assess vascular access sites hourly  3.  Every 4-6 hours minimum:  Change oxygen saturation probe site  4.  Every 4-6 hours:  If on nasal continuous positive airway pressure, respiratory therapy assess nares and determine need for appliance change or resting period  7/7/2025 1016 by Carmen Mixon RN  Outcome: Progressing  7/7/2025 0437 by Simi Madrigal RN  Outcome: Progressing

## 2025-07-08 LAB
BACTERIA URNS QL MICRO: ABNORMAL /HPF
BILIRUB UR QL STRIP: NEGATIVE
CLARITY UR: ABNORMAL
COLOR UR: YELLOW
EPI CELLS #/AREA URNS AUTO: ABNORMAL /HPF (ref 0–5)
GLUCOSE UR STRIP-MCNC: NEGATIVE MG/DL
HGB UR QL STRIP: NEGATIVE
HYALINE CASTS #/AREA URNS AUTO: ABNORMAL /HPF (ref 0–5)
KETONES UR STRIP-MCNC: NEGATIVE MG/DL
LEUKOCYTE ESTERASE UR QL STRIP: ABNORMAL
NITRITE UR QL STRIP: NEGATIVE
PH UR STRIP: 7.5 [PH] (ref 5–9)
PROT UR STRIP-MCNC: NEGATIVE MG/DL
RBC #/AREA URNS HPF: ABNORMAL /HPF (ref 0–2)
SP GR UR STRIP: 1.02 (ref 1–1.03)
URINE REFLEX TO CULTURE: YES
UROBILINOGEN UR STRIP-ACNC: 1 E.U./DL
WBC #/AREA URNS AUTO: ABNORMAL /HPF (ref 0–5)

## 2025-07-08 PROCEDURE — 99232 SBSQ HOSP IP/OBS MODERATE 35: CPT | Performed by: PHYSICAL MEDICINE & REHABILITATION

## 2025-07-08 PROCEDURE — 97535 SELF CARE MNGMENT TRAINING: CPT

## 2025-07-08 PROCEDURE — 92526 ORAL FUNCTION THERAPY: CPT

## 2025-07-08 PROCEDURE — 6360000002 HC RX W HCPCS: Performed by: INTERNAL MEDICINE

## 2025-07-08 PROCEDURE — 6370000000 HC RX 637 (ALT 250 FOR IP): Performed by: INTERNAL MEDICINE

## 2025-07-08 PROCEDURE — 87086 URINE CULTURE/COLONY COUNT: CPT

## 2025-07-08 PROCEDURE — 92507 TX SP LANG VOICE COMM INDIV: CPT

## 2025-07-08 PROCEDURE — 97116 GAIT TRAINING THERAPY: CPT

## 2025-07-08 PROCEDURE — 97129 THER IVNTJ 1ST 15 MIN: CPT

## 2025-07-08 PROCEDURE — 2500000003 HC RX 250 WO HCPCS: Performed by: PHYSICAL MEDICINE & REHABILITATION

## 2025-07-08 PROCEDURE — 81001 URINALYSIS AUTO W/SCOPE: CPT

## 2025-07-08 PROCEDURE — 6370000000 HC RX 637 (ALT 250 FOR IP): Performed by: PSYCHIATRY & NEUROLOGY

## 2025-07-08 PROCEDURE — 6370000000 HC RX 637 (ALT 250 FOR IP): Performed by: PHYSICAL MEDICINE & REHABILITATION

## 2025-07-08 PROCEDURE — 1180000000 HC REHAB R&B

## 2025-07-08 RX ORDER — CEFUROXIME AXETIL 500 MG/1
500 TABLET ORAL EVERY 12 HOURS SCHEDULED
Status: COMPLETED | OUTPATIENT
Start: 2025-07-08 | End: 2025-07-13

## 2025-07-08 RX ORDER — HYDROCODONE BITARTRATE AND ACETAMINOPHEN 7.5; 325 MG/1; MG/1
1 TABLET ORAL EVERY 6 HOURS PRN
Refills: 0 | Status: DISCONTINUED | OUTPATIENT
Start: 2025-07-08 | End: 2025-07-09

## 2025-07-08 RX ADMIN — Medication 400 MG: at 08:13

## 2025-07-08 RX ADMIN — CEFUROXIME AXETIL 500 MG: 500 TABLET ORAL at 20:22

## 2025-07-08 RX ADMIN — ACETAMINOPHEN 650 MG: 325 TABLET ORAL at 06:35

## 2025-07-08 RX ADMIN — SERTRALINE 25 MG: 25 TABLET, FILM COATED ORAL at 08:13

## 2025-07-08 RX ADMIN — PANTOPRAZOLE SODIUM 40 MG: 40 TABLET, DELAYED RELEASE ORAL at 06:39

## 2025-07-08 RX ADMIN — NYSTATIN 500000 UNITS: 100000 SUSPENSION ORAL at 14:47

## 2025-07-08 RX ADMIN — CLONIDINE HYDROCHLORIDE 0.2 MG: 0.2 TABLET ORAL at 14:49

## 2025-07-08 RX ADMIN — CLOPIDOGREL BISULFATE 75 MG: 75 TABLET, FILM COATED ORAL at 08:14

## 2025-07-08 RX ADMIN — HYDROCODONE BITARTRATE AND ACETAMINOPHEN 1 TABLET: 7.5; 325 TABLET ORAL at 23:58

## 2025-07-08 RX ADMIN — CLONIDINE HYDROCHLORIDE 0.2 MG: 0.2 TABLET ORAL at 20:22

## 2025-07-08 RX ADMIN — ACETAMINOPHEN 650 MG: 325 TABLET ORAL at 20:21

## 2025-07-08 RX ADMIN — CLONIDINE HYDROCHLORIDE 0.2 MG: 0.2 TABLET ORAL at 08:15

## 2025-07-08 RX ADMIN — HYDROCODONE BITARTRATE AND ACETAMINOPHEN 1 TABLET: 7.5; 325 TABLET ORAL at 11:44

## 2025-07-08 RX ADMIN — DOCUSATE SODIUM 100 MG: 100 CAPSULE, LIQUID FILLED ORAL at 08:13

## 2025-07-08 RX ADMIN — ENOXAPARIN SODIUM 40 MG: 100 INJECTION SUBCUTANEOUS at 08:15

## 2025-07-08 RX ADMIN — MICONAZOLE NITRATE: 20 POWDER TOPICAL at 08:23

## 2025-07-08 RX ADMIN — NYSTATIN 500000 UNITS: 100000 SUSPENSION ORAL at 08:15

## 2025-07-08 RX ADMIN — ATORVASTATIN CALCIUM 80 MG: 80 TABLET, FILM COATED ORAL at 20:22

## 2025-07-08 RX ADMIN — HYDROCODONE BITARTRATE AND ACETAMINOPHEN 1 TABLET: 7.5; 325 TABLET ORAL at 17:50

## 2025-07-08 RX ADMIN — NYSTATIN 500000 UNITS: 100000 SUSPENSION ORAL at 20:22

## 2025-07-08 RX ADMIN — HYDROCHLOROTHIAZIDE 25 MG: 25 TABLET ORAL at 08:13

## 2025-07-08 RX ADMIN — ANTACID TABLETS 500 MG: 500 TABLET, CHEWABLE ORAL at 08:13

## 2025-07-08 RX ADMIN — Medication 100 MG: at 08:12

## 2025-07-08 RX ADMIN — ACETAMINOPHEN 650 MG: 325 TABLET ORAL at 14:47

## 2025-07-08 RX ADMIN — AMLODIPINE BESYLATE 10 MG: 10 TABLET ORAL at 08:14

## 2025-07-08 RX ADMIN — Medication 5 MG: at 20:22

## 2025-07-08 RX ADMIN — Medication 2000 UNITS: at 17:34

## 2025-07-08 RX ADMIN — NYSTATIN 500000 UNITS: 100000 SUSPENSION ORAL at 17:34

## 2025-07-08 ASSESSMENT — PAIN SCALES - GENERAL
PAINLEVEL_OUTOF10: 8
PAINLEVEL_OUTOF10: 0
PAINLEVEL_OUTOF10: 6
PAINLEVEL_OUTOF10: 8

## 2025-07-08 ASSESSMENT — PAIN DESCRIPTION - DESCRIPTORS
DESCRIPTORS: ACHING;DISCOMFORT
DESCRIPTORS: ACHING;DISCOMFORT
DESCRIPTORS: THROBBING
DESCRIPTORS: ACHING
DESCRIPTORS: THROBBING
DESCRIPTORS: ACHING;DISCOMFORT

## 2025-07-08 ASSESSMENT — PAIN DESCRIPTION - ORIENTATION
ORIENTATION: LEFT

## 2025-07-08 ASSESSMENT — PAIN DESCRIPTION - LOCATION
LOCATION: HIP
LOCATION: HIP
LOCATION: HEAD
LOCATION: ARM
LOCATION: ARM
LOCATION: HIP

## 2025-07-08 NOTE — FLOWSHEET NOTE
Patient assessment completed. A&Ox4. Patient denies any pain this morning. /64, HR 64, at 0804. Amlodipine, clonidine, hydrochlorothiazide given per MAR. /74, HR 64, at 0826. Rehab MD notified. All other morning medications given per MAR. LBM 7/7. Incontinent of bladder and bowel. Urine is yellow and cloudy. Rehab MD notified. Patient in bed call light within reach, and bed alarm on. Electronically signed by Soni Munoz RN on 7/8/25 at 8:14 AM EDT     1000: UA sent.

## 2025-07-08 NOTE — FLOWSHEET NOTE
Patient is resting in bed at this time with some complaints of left arm pain rated 6/10. Patient medicated with Tylenol 650 MG PO PRN for pain with the rest of her evening medications. Patient took medications whole at once with sips of water without difficulty. Patient declined scheduled Colace PO, scheduled Proctofoam-HC rectal foam, scheduled Anusol-HC rectal cream and scheduled Micotin Powder. Patient repositioned in bed for comfort with several pillows in place. Patient has a purewick in place with a depends due to urinary incontinence. Patient states she is comfortable and verbalized no further needs at this time. Patients bed alarm is engaged and call light is within reach.

## 2025-07-08 NOTE — PLAN OF CARE
Problem: Chronic Conditions and Co-morbidities  Goal: Patient's chronic conditions and co-morbidity symptoms are monitored and maintained or improved  Outcome: Progressing     Problem: Pain  Goal: Verbalizes/displays adequate comfort level or baseline comfort level  Outcome: Progressing     Problem: Safety - Adult  Goal: Free from fall injury  7/8/2025 0900 by Soni Munoz, RN  Outcome: Progressing  7/8/2025 0024 by Viji Carlisle, RN  Outcome: Progressing     Problem: Skin/Tissue Integrity  Goal: Skin integrity remains intact  Description: 1.  Monitor for areas of redness and/or skin breakdown  2.  Assess vascular access sites hourly  3.  Every 4-6 hours minimum:  Change oxygen saturation probe site  4.  Every 4-6 hours:  If on nasal continuous positive airway pressure, respiratory therapy assess nares and determine need for appliance change or resting period  Outcome: Progressing

## 2025-07-09 LAB — BACTERIA UR CULT: NORMAL

## 2025-07-09 PROCEDURE — 6370000000 HC RX 637 (ALT 250 FOR IP): Performed by: PHYSICAL MEDICINE & REHABILITATION

## 2025-07-09 PROCEDURE — 6360000002 HC RX W HCPCS: Performed by: INTERNAL MEDICINE

## 2025-07-09 PROCEDURE — 6370000000 HC RX 637 (ALT 250 FOR IP): Performed by: INTERNAL MEDICINE

## 2025-07-09 PROCEDURE — 97130 THER IVNTJ EA ADDL 15 MIN: CPT

## 2025-07-09 PROCEDURE — 97116 GAIT TRAINING THERAPY: CPT

## 2025-07-09 PROCEDURE — 97110 THERAPEUTIC EXERCISES: CPT

## 2025-07-09 PROCEDURE — 97533 SENSORY INTEGRATION: CPT

## 2025-07-09 PROCEDURE — 1180000000 HC REHAB R&B

## 2025-07-09 PROCEDURE — 97535 SELF CARE MNGMENT TRAINING: CPT

## 2025-07-09 PROCEDURE — 97129 THER IVNTJ 1ST 15 MIN: CPT

## 2025-07-09 PROCEDURE — 6370000000 HC RX 637 (ALT 250 FOR IP): Performed by: PSYCHIATRY & NEUROLOGY

## 2025-07-09 RX ORDER — HYDROCORTISONE 25 MG/G
CREAM TOPICAL 2 TIMES DAILY
Status: DISCONTINUED | OUTPATIENT
Start: 2025-07-09 | End: 2025-07-16 | Stop reason: HOSPADM

## 2025-07-09 RX ORDER — ZOLPIDEM TARTRATE 5 MG/1
5 TABLET ORAL NIGHTLY PRN
Status: DISCONTINUED | OUTPATIENT
Start: 2025-07-09 | End: 2025-07-16 | Stop reason: HOSPADM

## 2025-07-09 RX ORDER — HYDROCODONE BITARTRATE AND ACETAMINOPHEN 10; 325 MG/1; MG/1
1 TABLET ORAL EVERY 4 HOURS PRN
Refills: 0 | Status: DISCONTINUED | OUTPATIENT
Start: 2025-07-09 | End: 2025-07-16 | Stop reason: HOSPADM

## 2025-07-09 RX ADMIN — PANTOPRAZOLE SODIUM 40 MG: 40 TABLET, DELAYED RELEASE ORAL at 05:16

## 2025-07-09 RX ADMIN — NYSTATIN 500000 UNITS: 100000 SUSPENSION ORAL at 09:15

## 2025-07-09 RX ADMIN — NYSTATIN 500000 UNITS: 100000 SUSPENSION ORAL at 12:41

## 2025-07-09 RX ADMIN — ATORVASTATIN CALCIUM 80 MG: 80 TABLET, FILM COATED ORAL at 21:30

## 2025-07-09 RX ADMIN — CEFUROXIME AXETIL 500 MG: 500 TABLET ORAL at 21:29

## 2025-07-09 RX ADMIN — Medication 400 MG: at 09:17

## 2025-07-09 RX ADMIN — ANTACID TABLETS 500 MG: 500 TABLET, CHEWABLE ORAL at 09:16

## 2025-07-09 RX ADMIN — NYSTATIN 500000 UNITS: 100000 SUSPENSION ORAL at 21:29

## 2025-07-09 RX ADMIN — CEFUROXIME AXETIL 500 MG: 500 TABLET ORAL at 09:16

## 2025-07-09 RX ADMIN — CLONIDINE HYDROCHLORIDE 0.2 MG: 0.2 TABLET ORAL at 12:41

## 2025-07-09 RX ADMIN — MENTHOL: 0 POWDER TOPICAL at 12:41

## 2025-07-09 RX ADMIN — Medication 2000 UNITS: at 17:11

## 2025-07-09 RX ADMIN — SERTRALINE 25 MG: 25 TABLET, FILM COATED ORAL at 09:16

## 2025-07-09 RX ADMIN — ENOXAPARIN SODIUM 40 MG: 100 INJECTION SUBCUTANEOUS at 09:15

## 2025-07-09 RX ADMIN — CLONIDINE HYDROCHLORIDE 0.2 MG: 0.2 TABLET ORAL at 09:16

## 2025-07-09 RX ADMIN — AMLODIPINE BESYLATE 10 MG: 10 TABLET ORAL at 09:17

## 2025-07-09 RX ADMIN — CLOPIDOGREL BISULFATE 75 MG: 75 TABLET, FILM COATED ORAL at 09:16

## 2025-07-09 RX ADMIN — HYDROCODONE BITARTRATE AND ACETAMINOPHEN 1 TABLET: 10; 325 TABLET ORAL at 09:15

## 2025-07-09 RX ADMIN — Medication 100 MG: at 09:16

## 2025-07-09 RX ADMIN — NYSTATIN 500000 UNITS: 100000 SUSPENSION ORAL at 17:11

## 2025-07-09 RX ADMIN — HYDROCHLOROTHIAZIDE 25 MG: 25 TABLET ORAL at 09:16

## 2025-07-09 RX ADMIN — HYDROCODONE BITARTRATE AND ACETAMINOPHEN 1 TABLET: 10; 325 TABLET ORAL at 21:29

## 2025-07-09 RX ADMIN — CLONIDINE HYDROCHLORIDE 0.2 MG: 0.2 TABLET ORAL at 21:31

## 2025-07-09 RX ADMIN — HYDROCODONE BITARTRATE AND ACETAMINOPHEN 1 TABLET: 10; 325 TABLET ORAL at 17:11

## 2025-07-09 RX ADMIN — Medication 5 MG: at 21:29

## 2025-07-09 RX ADMIN — ZOLPIDEM TARTRATE 5 MG: 5 TABLET ORAL at 21:29

## 2025-07-09 ASSESSMENT — PAIN SCALES - GENERAL
PAINLEVEL_OUTOF10: 4
PAINLEVEL_OUTOF10: 7
PAINLEVEL_OUTOF10: 4

## 2025-07-09 ASSESSMENT — PAIN DESCRIPTION - ORIENTATION
ORIENTATION: LEFT
ORIENTATION: LEFT

## 2025-07-09 ASSESSMENT — PAIN DESCRIPTION - LOCATION
LOCATION: ARM
LOCATION: ARM

## 2025-07-09 ASSESSMENT — PAIN DESCRIPTION - DESCRIPTORS
DESCRIPTORS: ACHING
DESCRIPTORS: ACHING;DISCOMFORT

## 2025-07-09 NOTE — FLOWSHEET NOTE
Patients blood pressure has improved to 158/75. Patient complains of left arm pain rated 6/10. Patient repositioned in bed with several pillows in place for comfort. Patient medicated with Norco 7.5-325 MG PO PRN for pain per patient request. Patient verbalized no further needs at this time. Patients bed alarm is engaged and call light is within reach.

## 2025-07-09 NOTE — FLOWSHEET NOTE
Patients blood pressure is currently 178/63. Patient medicated with scheduled Catapres 0.2 MG PO TID. Patient complains of left arm discomfort rated 8/10. Patient had been medicated with Norco 7.5-325 MG PO PRN at 5:50 PM and patient states that she does not think that medication helped the pain in her left arm. Patient medicated with Tylenol 650 MG PO PRN for pain with the rest of her evening medications. Patient declined scheduled Tums, Colace, Micotin 2% powder,Anusol-HC rectal cream, and Proctofoam-HC. Patient took her medications whole at once with sips of water without difficulty. Patient declined an HS snack. Patient verbalized no further needs at this time. Patients bed alarm is engaged and call light is within reach.

## 2025-07-10 PROBLEM — F32.A DEPRESSION: Status: ACTIVE | Noted: 2025-07-10

## 2025-07-10 PROCEDURE — 6370000000 HC RX 637 (ALT 250 FOR IP): Performed by: INTERNAL MEDICINE

## 2025-07-10 PROCEDURE — 92507 TX SP LANG VOICE COMM INDIV: CPT

## 2025-07-10 PROCEDURE — 1180000000 HC REHAB R&B

## 2025-07-10 PROCEDURE — 6370000000 HC RX 637 (ALT 250 FOR IP): Performed by: PHYSICAL MEDICINE & REHABILITATION

## 2025-07-10 PROCEDURE — 6370000000 HC RX 637 (ALT 250 FOR IP): Performed by: PSYCHIATRY & NEUROLOGY

## 2025-07-10 PROCEDURE — 99232 SBSQ HOSP IP/OBS MODERATE 35: CPT | Performed by: PSYCHIATRY & NEUROLOGY

## 2025-07-10 PROCEDURE — 97112 NEUROMUSCULAR REEDUCATION: CPT

## 2025-07-10 PROCEDURE — 97535 SELF CARE MNGMENT TRAINING: CPT

## 2025-07-10 PROCEDURE — 92526 ORAL FUNCTION THERAPY: CPT

## 2025-07-10 PROCEDURE — 97032 APPL MODALITY 1+ESTIM EA 15: CPT

## 2025-07-10 PROCEDURE — 6360000002 HC RX W HCPCS: Performed by: INTERNAL MEDICINE

## 2025-07-10 PROCEDURE — 2500000003 HC RX 250 WO HCPCS: Performed by: PHYSICAL MEDICINE & REHABILITATION

## 2025-07-10 PROCEDURE — 97110 THERAPEUTIC EXERCISES: CPT

## 2025-07-10 PROCEDURE — 99232 SBSQ HOSP IP/OBS MODERATE 35: CPT | Performed by: PHYSICAL MEDICINE & REHABILITATION

## 2025-07-10 PROCEDURE — 97116 GAIT TRAINING THERAPY: CPT

## 2025-07-10 RX ADMIN — HYDROCODONE BITARTRATE AND ACETAMINOPHEN 1 TABLET: 10; 325 TABLET ORAL at 20:45

## 2025-07-10 RX ADMIN — DOCUSATE SODIUM 100 MG: 100 CAPSULE, LIQUID FILLED ORAL at 20:45

## 2025-07-10 RX ADMIN — AMLODIPINE BESYLATE 10 MG: 10 TABLET ORAL at 09:40

## 2025-07-10 RX ADMIN — ANTACID TABLETS 500 MG: 500 TABLET, CHEWABLE ORAL at 09:39

## 2025-07-10 RX ADMIN — MENTHOL: 0 POWDER TOPICAL at 09:46

## 2025-07-10 RX ADMIN — ATORVASTATIN CALCIUM 80 MG: 80 TABLET, FILM COATED ORAL at 20:45

## 2025-07-10 RX ADMIN — NYSTATIN 500000 UNITS: 100000 SUSPENSION ORAL at 17:24

## 2025-07-10 RX ADMIN — Medication 2000 UNITS: at 17:24

## 2025-07-10 RX ADMIN — SERTRALINE 25 MG: 25 TABLET, FILM COATED ORAL at 09:39

## 2025-07-10 RX ADMIN — Medication 5 MG: at 20:45

## 2025-07-10 RX ADMIN — NYSTATIN 500000 UNITS: 100000 SUSPENSION ORAL at 20:46

## 2025-07-10 RX ADMIN — CEFUROXIME AXETIL 500 MG: 500 TABLET ORAL at 20:45

## 2025-07-10 RX ADMIN — NYSTATIN 500000 UNITS: 100000 SUSPENSION ORAL at 15:15

## 2025-07-10 RX ADMIN — CLONIDINE HYDROCHLORIDE 0.2 MG: 0.2 TABLET ORAL at 09:40

## 2025-07-10 RX ADMIN — CLOPIDOGREL BISULFATE 75 MG: 75 TABLET, FILM COATED ORAL at 09:40

## 2025-07-10 RX ADMIN — CLONIDINE HYDROCHLORIDE 0.2 MG: 0.2 TABLET ORAL at 15:15

## 2025-07-10 RX ADMIN — CLONIDINE HYDROCHLORIDE 0.2 MG: 0.2 TABLET ORAL at 20:45

## 2025-07-10 RX ADMIN — CEFUROXIME AXETIL 500 MG: 500 TABLET ORAL at 09:39

## 2025-07-10 RX ADMIN — ENOXAPARIN SODIUM 40 MG: 100 INJECTION SUBCUTANEOUS at 09:40

## 2025-07-10 RX ADMIN — Medication 100 MG: at 09:39

## 2025-07-10 RX ADMIN — ZOLPIDEM TARTRATE 5 MG: 5 TABLET ORAL at 20:45

## 2025-07-10 RX ADMIN — HYDROCHLOROTHIAZIDE 25 MG: 25 TABLET ORAL at 09:38

## 2025-07-10 RX ADMIN — MICONAZOLE NITRATE: 20 POWDER TOPICAL at 09:47

## 2025-07-10 RX ADMIN — HYDROCODONE BITARTRATE AND ACETAMINOPHEN 1 TABLET: 10; 325 TABLET ORAL at 15:17

## 2025-07-10 RX ADMIN — PANTOPRAZOLE SODIUM 40 MG: 40 TABLET, DELAYED RELEASE ORAL at 06:25

## 2025-07-10 RX ADMIN — DOCUSATE SODIUM 100 MG: 100 CAPSULE, LIQUID FILLED ORAL at 09:39

## 2025-07-10 RX ADMIN — Medication 400 MG: at 09:39

## 2025-07-10 RX ADMIN — NYSTATIN 500000 UNITS: 100000 SUSPENSION ORAL at 09:39

## 2025-07-10 ASSESSMENT — PAIN DESCRIPTION - LOCATION
LOCATION: HAND;ARM
LOCATION: ARM

## 2025-07-10 ASSESSMENT — PAIN DESCRIPTION - ORIENTATION
ORIENTATION: LEFT
ORIENTATION: LEFT

## 2025-07-10 ASSESSMENT — PAIN DESCRIPTION - DESCRIPTORS
DESCRIPTORS: ACHING
DESCRIPTORS: ACHING;DISCOMFORT

## 2025-07-10 ASSESSMENT — PAIN SCALES - GENERAL
PAINLEVEL_OUTOF10: 5
PAINLEVEL_OUTOF10: 5
PAINLEVEL_OUTOF10: 0

## 2025-07-10 NOTE — PLAN OF CARE
Problem: Chronic Conditions and Co-morbidities  Goal: Patient's chronic conditions and co-morbidity symptoms are monitored and maintained or improved  7/10/2025 1159 by Malorie Galvez RN  Outcome: Progressing  7/10/2025 0103 by Yaneli Barahona RN  Outcome: Progressing     Problem: Discharge Planning  Goal: Discharge to home or other facility with appropriate resources  7/10/2025 1159 by Malorie Galvez RN  Outcome: Progressing  7/10/2025 0103 by Yaenli Barahona RN  Outcome: Progressing     Problem: Pain  Goal: Verbalizes/displays adequate comfort level or baseline comfort level  7/10/2025 1159 by Malorie Galvez RN  Outcome: Progressing  7/10/2025 0103 by Yaneli Barahona RN  Outcome: Progressing     Problem: Safety - Adult  Goal: Free from fall injury  7/10/2025 1159 by Malorie Galvez RN  Outcome: Progressing  7/10/2025 0103 by Yaneli Barahona RN  Outcome: Progressing     Problem: Skin/Tissue Integrity  Goal: Skin integrity remains intact  Description: 1.  Monitor for areas of redness and/or skin breakdown  2.  Assess vascular access sites hourly  3.  Every 4-6 hours minimum:  Change oxygen saturation probe site  4.  Every 4-6 hours:  If on nasal continuous positive airway pressure, respiratory therapy assess nares and determine need for appliance change or resting period  7/10/2025 1159 by Malorie Galvez RN  Outcome: Progressing  7/10/2025 0103 by Yaneli Barahona RN  Outcome: Progressing

## 2025-07-10 NOTE — FLOWSHEET NOTE
Patient is resting in bed at this time with some complaints of left arm pain rated 4/10. Patient is requesting medication for pain and to help her sleep. Patient medicated with scheduled Melatonin 5 MG PO and Ambien 5 MG PO PRN for sleep. Patient medicated with Norco  MG PO PRN for pain. Patient took her medications whole at once with sips of water without difficulty. Patient declined an HS snack. Patient has a purewick in place with a depends due to urinary incontinence. Patient repositioned in bed with several pillows in place. Patients bed alarm is engaged and call light is within reach.

## 2025-07-10 NOTE — PLAN OF CARE
Problem: Chronic Conditions and Co-morbidities  Goal: Patient's chronic conditions and co-morbidity symptoms are monitored and maintained or improved  7/10/2025 0103 by Yaneli Barahona RN  Outcome: Progressing  7/9/2025 1600 by Rain Starks RN  Outcome: Progressing     Problem: Discharge Planning  Goal: Discharge to home or other facility with appropriate resources  7/10/2025 0103 by Yaneli Barahona RN  Outcome: Progressing  7/9/2025 1600 by Rain Starks RN  Outcome: Progressing     Problem: Pain  Goal: Verbalizes/displays adequate comfort level or baseline comfort level  7/10/2025 0103 by Yaneli Barahona RN  Outcome: Progressing  7/9/2025 1600 by Rain Starks RN  Outcome: Progressing     Problem: Safety - Adult  Goal: Free from fall injury  7/10/2025 0103 by Yaneli Barahona RN  Outcome: Progressing  7/9/2025 1600 by Rain Starks RN  Outcome: Progressing     Problem: Skin/Tissue Integrity  Goal: Skin integrity remains intact  Description: 1.  Monitor for areas of redness and/or skin breakdown  2.  Assess vascular access sites hourly  3.  Every 4-6 hours minimum:  Change oxygen saturation probe site  4.  Every 4-6 hours:  If on nasal continuous positive airway pressure, respiratory therapy assess nares and determine need for appliance change or resting period  7/10/2025 0103 by Yaneli Barahona RN  Outcome: Progressing  7/9/2025 1600 by Rain Starks RN  Outcome: Progressing

## 2025-07-11 PROCEDURE — 6370000000 HC RX 637 (ALT 250 FOR IP): Performed by: INTERNAL MEDICINE

## 2025-07-11 PROCEDURE — 6370000000 HC RX 637 (ALT 250 FOR IP): Performed by: PSYCHIATRY & NEUROLOGY

## 2025-07-11 PROCEDURE — 99232 SBSQ HOSP IP/OBS MODERATE 35: CPT | Performed by: PHYSICAL MEDICINE & REHABILITATION

## 2025-07-11 PROCEDURE — 97110 THERAPEUTIC EXERCISES: CPT

## 2025-07-11 PROCEDURE — 2500000003 HC RX 250 WO HCPCS: Performed by: PHYSICAL MEDICINE & REHABILITATION

## 2025-07-11 PROCEDURE — 97533 SENSORY INTEGRATION: CPT

## 2025-07-11 PROCEDURE — 97112 NEUROMUSCULAR REEDUCATION: CPT

## 2025-07-11 PROCEDURE — 6360000002 HC RX W HCPCS: Performed by: INTERNAL MEDICINE

## 2025-07-11 PROCEDURE — 97535 SELF CARE MNGMENT TRAINING: CPT

## 2025-07-11 PROCEDURE — 97129 THER IVNTJ 1ST 15 MIN: CPT

## 2025-07-11 PROCEDURE — 97530 THERAPEUTIC ACTIVITIES: CPT

## 2025-07-11 PROCEDURE — 97130 THER IVNTJ EA ADDL 15 MIN: CPT

## 2025-07-11 PROCEDURE — 1180000000 HC REHAB R&B

## 2025-07-11 PROCEDURE — 6370000000 HC RX 637 (ALT 250 FOR IP): Performed by: PHYSICAL MEDICINE & REHABILITATION

## 2025-07-11 PROCEDURE — 97140 MANUAL THERAPY 1/> REGIONS: CPT

## 2025-07-11 RX ADMIN — DOCUSATE SODIUM 100 MG: 100 CAPSULE, LIQUID FILLED ORAL at 21:40

## 2025-07-11 RX ADMIN — HYDROCODONE BITARTRATE AND ACETAMINOPHEN 1 TABLET: 10; 325 TABLET ORAL at 06:20

## 2025-07-11 RX ADMIN — DOCUSATE SODIUM 100 MG: 100 CAPSULE, LIQUID FILLED ORAL at 09:40

## 2025-07-11 RX ADMIN — Medication 5 MG: at 21:40

## 2025-07-11 RX ADMIN — NYSTATIN 500000 UNITS: 100000 SUSPENSION ORAL at 13:08

## 2025-07-11 RX ADMIN — ACETAMINOPHEN 650 MG: 325 TABLET ORAL at 13:05

## 2025-07-11 RX ADMIN — Medication 400 MG: at 09:40

## 2025-07-11 RX ADMIN — ZOLPIDEM TARTRATE 5 MG: 5 TABLET ORAL at 21:40

## 2025-07-11 RX ADMIN — Medication 2000 UNITS: at 16:29

## 2025-07-11 RX ADMIN — CEFUROXIME AXETIL 500 MG: 500 TABLET ORAL at 09:40

## 2025-07-11 RX ADMIN — SERTRALINE 25 MG: 25 TABLET, FILM COATED ORAL at 09:40

## 2025-07-11 RX ADMIN — NYSTATIN 500000 UNITS: 100000 SUSPENSION ORAL at 09:40

## 2025-07-11 RX ADMIN — ANTACID TABLETS 500 MG: 500 TABLET, CHEWABLE ORAL at 09:39

## 2025-07-11 RX ADMIN — MICONAZOLE NITRATE: 20 POWDER TOPICAL at 12:58

## 2025-07-11 RX ADMIN — CLONIDINE HYDROCHLORIDE 0.2 MG: 0.2 TABLET ORAL at 13:07

## 2025-07-11 RX ADMIN — CLOPIDOGREL BISULFATE 75 MG: 75 TABLET, FILM COATED ORAL at 09:39

## 2025-07-11 RX ADMIN — NYSTATIN 500000 UNITS: 100000 SUSPENSION ORAL at 16:29

## 2025-07-11 RX ADMIN — ATORVASTATIN CALCIUM 80 MG: 80 TABLET, FILM COATED ORAL at 21:40

## 2025-07-11 RX ADMIN — ENOXAPARIN SODIUM 40 MG: 100 INJECTION SUBCUTANEOUS at 09:40

## 2025-07-11 RX ADMIN — CEFUROXIME AXETIL 500 MG: 500 TABLET ORAL at 21:40

## 2025-07-11 RX ADMIN — Medication 100 MG: at 09:40

## 2025-07-11 RX ADMIN — HYDROCHLOROTHIAZIDE 25 MG: 25 TABLET ORAL at 09:39

## 2025-07-11 RX ADMIN — NYSTATIN 500000 UNITS: 100000 SUSPENSION ORAL at 21:40

## 2025-07-11 RX ADMIN — MENTHOL: 0 POWDER TOPICAL at 12:58

## 2025-07-11 RX ADMIN — AMLODIPINE BESYLATE 10 MG: 10 TABLET ORAL at 09:39

## 2025-07-11 RX ADMIN — CLONIDINE HYDROCHLORIDE 0.2 MG: 0.2 TABLET ORAL at 09:40

## 2025-07-11 RX ADMIN — CLONIDINE HYDROCHLORIDE 0.2 MG: 0.2 TABLET ORAL at 21:40

## 2025-07-11 RX ADMIN — PANTOPRAZOLE SODIUM 40 MG: 40 TABLET, DELAYED RELEASE ORAL at 06:20

## 2025-07-11 RX ADMIN — HYDROCODONE BITARTRATE AND ACETAMINOPHEN 1 TABLET: 10; 325 TABLET ORAL at 21:40

## 2025-07-11 ASSESSMENT — PAIN DESCRIPTION - DESCRIPTORS
DESCRIPTORS: ACHING;DISCOMFORT
DESCRIPTORS: ACHING
DESCRIPTORS: ACHING;DISCOMFORT

## 2025-07-11 ASSESSMENT — PAIN DESCRIPTION - LOCATION
LOCATION: HEAD
LOCATION: ARM
LOCATION: ARM

## 2025-07-11 ASSESSMENT — PAIN SCALES - GENERAL
PAINLEVEL_OUTOF10: 5
PAINLEVEL_OUTOF10: 5
PAINLEVEL_OUTOF10: 0

## 2025-07-11 ASSESSMENT — PAIN DESCRIPTION - ORIENTATION
ORIENTATION: LEFT
ORIENTATION: LEFT

## 2025-07-11 NOTE — FLOWSHEET NOTE
Patient is resting in bed with complaints of left arm pain rated 5/10. Patient would like medication to help her sleep. Patient medicated with Norco  MG PO PRN for pain and Ambien 5 MG PO PRN for sleep. Patient took her medications whole at once with sips of water without difficulty. Patient has an external purewick in place with a depends due to urinary incontinence. Patient declined an HS snack. Patient verbalized no further needs at this time. Patients bed alarm is engaged and call light is within reach.

## 2025-07-11 NOTE — PLAN OF CARE
Problem: Safety - Adult  Goal: Free from fall injury  7/11/2025 0117 by Viji Carlisle, RN  Outcome: Progressing  7/10/2025 1159 by Malorie Galvez, RN  Outcome: Progressing

## 2025-07-11 NOTE — PLAN OF CARE
Problem: Chronic Conditions and Co-morbidities  Goal: Patient's chronic conditions and co-morbidity symptoms are monitored and maintained or improved  Outcome: Progressing     Problem: Discharge Planning  Goal: Discharge to home or other facility with appropriate resources  Outcome: Progressing     Problem: Pain  Goal: Verbalizes/displays adequate comfort level or baseline comfort level  Outcome: Progressing     Problem: Safety - Adult  Goal: Free from fall injury  7/11/2025 1414 by Carmen Mixon, RN  Outcome: Progressing  7/11/2025 0117 by Viji Carlisle, RN  Outcome: Progressing     Problem: Skin/Tissue Integrity  Goal: Skin integrity remains intact  Description: 1.  Monitor for areas of redness and/or skin breakdown  2.  Assess vascular access sites hourly  3.  Every 4-6 hours minimum:  Change oxygen saturation probe site  4.  Every 4-6 hours:  If on nasal continuous positive airway pressure, respiratory therapy assess nares and determine need for appliance change or resting period  Outcome: Progressing

## 2025-07-12 PROCEDURE — 1180000000 HC REHAB R&B

## 2025-07-12 PROCEDURE — 6370000000 HC RX 637 (ALT 250 FOR IP): Performed by: PHYSICAL MEDICINE & REHABILITATION

## 2025-07-12 PROCEDURE — 6370000000 HC RX 637 (ALT 250 FOR IP): Performed by: PSYCHIATRY & NEUROLOGY

## 2025-07-12 PROCEDURE — 99232 SBSQ HOSP IP/OBS MODERATE 35: CPT | Performed by: PHYSICAL MEDICINE & REHABILITATION

## 2025-07-12 PROCEDURE — 6360000002 HC RX W HCPCS: Performed by: INTERNAL MEDICINE

## 2025-07-12 PROCEDURE — 97116 GAIT TRAINING THERAPY: CPT

## 2025-07-12 PROCEDURE — 6370000000 HC RX 637 (ALT 250 FOR IP): Performed by: INTERNAL MEDICINE

## 2025-07-12 PROCEDURE — 2500000003 HC RX 250 WO HCPCS: Performed by: PHYSICAL MEDICINE & REHABILITATION

## 2025-07-12 PROCEDURE — 97535 SELF CARE MNGMENT TRAINING: CPT

## 2025-07-12 RX ORDER — LACTULOSE 10 G/15ML
30 SOLUTION ORAL ONCE
Status: COMPLETED | OUTPATIENT
Start: 2025-07-12 | End: 2025-07-12

## 2025-07-12 RX ORDER — LACTULOSE 10 G/15ML
30 SOLUTION ORAL DAILY
Status: DISCONTINUED | OUTPATIENT
Start: 2025-07-12 | End: 2025-07-12

## 2025-07-12 RX ORDER — BISACODYL 10 MG
10 SUPPOSITORY, RECTAL RECTAL DAILY PRN
Status: DISCONTINUED | OUTPATIENT
Start: 2025-07-12 | End: 2025-07-16 | Stop reason: HOSPADM

## 2025-07-12 RX ADMIN — NYSTATIN 500000 UNITS: 100000 SUSPENSION ORAL at 14:42

## 2025-07-12 RX ADMIN — PANTOPRAZOLE SODIUM 40 MG: 40 TABLET, DELAYED RELEASE ORAL at 05:11

## 2025-07-12 RX ADMIN — ZOLPIDEM TARTRATE 5 MG: 5 TABLET ORAL at 22:38

## 2025-07-12 RX ADMIN — MENTHOL: 0 POWDER TOPICAL at 11:00

## 2025-07-12 RX ADMIN — NYSTATIN 500000 UNITS: 100000 SUSPENSION ORAL at 17:37

## 2025-07-12 RX ADMIN — Medication 5 MG: at 22:37

## 2025-07-12 RX ADMIN — CLOPIDOGREL BISULFATE 75 MG: 75 TABLET, FILM COATED ORAL at 10:56

## 2025-07-12 RX ADMIN — Medication 2000 UNITS: at 17:35

## 2025-07-12 RX ADMIN — LACTULOSE 30 G: 10 SOLUTION ORAL at 14:25

## 2025-07-12 RX ADMIN — CEFUROXIME AXETIL 500 MG: 500 TABLET ORAL at 22:38

## 2025-07-12 RX ADMIN — HYDROCHLOROTHIAZIDE 25 MG: 25 TABLET ORAL at 10:56

## 2025-07-12 RX ADMIN — CLONIDINE HYDROCHLORIDE 0.2 MG: 0.2 TABLET ORAL at 10:57

## 2025-07-12 RX ADMIN — HYDROCODONE BITARTRATE AND ACETAMINOPHEN 1 TABLET: 10; 325 TABLET ORAL at 05:11

## 2025-07-12 RX ADMIN — NYSTATIN 500000 UNITS: 100000 SUSPENSION ORAL at 10:55

## 2025-07-12 RX ADMIN — NYSTATIN 500000 UNITS: 100000 SUSPENSION ORAL at 22:39

## 2025-07-12 RX ADMIN — Medication 100 MG: at 10:56

## 2025-07-12 RX ADMIN — CEFUROXIME AXETIL 500 MG: 500 TABLET ORAL at 10:55

## 2025-07-12 RX ADMIN — ANTACID TABLETS 500 MG: 500 TABLET, CHEWABLE ORAL at 10:55

## 2025-07-12 RX ADMIN — MICONAZOLE NITRATE: 20 POWDER TOPICAL at 11:00

## 2025-07-12 RX ADMIN — Medication 400 MG: at 10:58

## 2025-07-12 RX ADMIN — ENOXAPARIN SODIUM 40 MG: 100 INJECTION SUBCUTANEOUS at 10:54

## 2025-07-12 RX ADMIN — CLONIDINE HYDROCHLORIDE 0.2 MG: 0.2 TABLET ORAL at 17:35

## 2025-07-12 RX ADMIN — DOCUSATE SODIUM 100 MG: 100 CAPSULE, LIQUID FILLED ORAL at 22:38

## 2025-07-12 RX ADMIN — ATORVASTATIN CALCIUM 80 MG: 80 TABLET, FILM COATED ORAL at 22:37

## 2025-07-12 RX ADMIN — DOCUSATE SODIUM 100 MG: 100 CAPSULE, LIQUID FILLED ORAL at 10:57

## 2025-07-12 RX ADMIN — SERTRALINE 25 MG: 25 TABLET, FILM COATED ORAL at 10:55

## 2025-07-12 RX ADMIN — HYDROCORTISONE: 25 CREAM TOPICAL at 11:00

## 2025-07-12 RX ADMIN — AMLODIPINE BESYLATE 10 MG: 10 TABLET ORAL at 10:55

## 2025-07-12 RX ADMIN — HYDROCODONE BITARTRATE AND ACETAMINOPHEN 1 TABLET: 10; 325 TABLET ORAL at 22:38

## 2025-07-12 RX ADMIN — CLONIDINE HYDROCHLORIDE 0.2 MG: 0.2 TABLET ORAL at 22:38

## 2025-07-12 ASSESSMENT — PAIN DESCRIPTION - LOCATION
LOCATION: ARM;HAND
LOCATION: ARM;HAND

## 2025-07-12 ASSESSMENT — PAIN SCALES - GENERAL
PAINLEVEL_OUTOF10: 6
PAINLEVEL_OUTOF10: 8
PAINLEVEL_OUTOF10: 0

## 2025-07-12 ASSESSMENT — PAIN DESCRIPTION - ORIENTATION
ORIENTATION: LEFT
ORIENTATION: LEFT

## 2025-07-12 ASSESSMENT — PAIN DESCRIPTION - DESCRIPTORS
DESCRIPTORS: ACHING;DISCOMFORT
DESCRIPTORS: ACHING;DISCOMFORT

## 2025-07-12 NOTE — FLOWSHEET NOTE
Patient complains of left arm pain rated 5/10. Patient is requesting medication to help her sleep this evening. Patient medicated with Norco  MG PO PRN for pain and Ambien 5 MG PO PRN for sleep. Patient took her medications whole at once with sips of water without difficulty. Patients depends was changed and new purewick in place per request for episodes of urinary incontinence. Patient repositioned in bed with her left arm elevated on a pillow. Applied an ice bag to patients left arm. Patient states she is comfortable and verbalized no further needs at this time. Patients bed alarm is engaged and call light is within reach.

## 2025-07-12 NOTE — PLAN OF CARE
Problem: Chronic Conditions and Co-morbidities  Goal: Patient's chronic conditions and co-morbidity symptoms are monitored and maintained or improved  Outcome: Progressing  Flowsheets (Taken 7/12/2025 1000)  Care Plan - Patient's Chronic Conditions and Co-Morbidity Symptoms are Monitored and Maintained or Improved: Monitor and assess patient's chronic conditions and comorbid symptoms for stability, deterioration, or improvement     Problem: Discharge Planning  Goal: Discharge to home or other facility with appropriate resources  Outcome: Progressing  Flowsheets (Taken 7/12/2025 1000)  Discharge to home or other facility with appropriate resources: Identify barriers to discharge with patient and caregiver     Problem: Pain  Goal: Verbalizes/displays adequate comfort level or baseline comfort level  Outcome: Progressing     Problem: Safety - Adult  Goal: Free from fall injury  7/12/2025 1158 by Aziza Middleton, RN  Outcome: Progressing  7/12/2025 0637 by Viji Carlisle, RN  Outcome: Progressing     Problem: Skin/Tissue Integrity  Goal: Skin integrity remains intact  Description: 1.  Monitor for areas of redness and/or skin breakdown  2.  Assess vascular access sites hourly  3.  Every 4-6 hours minimum:  Change oxygen saturation probe site  4.  Every 4-6 hours:  If on nasal continuous positive airway pressure, respiratory therapy assess nares and determine need for appliance change or resting period  Outcome: Progressing  Flowsheets (Taken 7/12/2025 1158)  Skin Integrity Remains Intact: Monitor for areas of redness and/or skin breakdown

## 2025-07-13 PROCEDURE — 1180000000 HC REHAB R&B

## 2025-07-13 PROCEDURE — 6370000000 HC RX 637 (ALT 250 FOR IP): Performed by: INTERNAL MEDICINE

## 2025-07-13 PROCEDURE — 6370000000 HC RX 637 (ALT 250 FOR IP): Performed by: PSYCHIATRY & NEUROLOGY

## 2025-07-13 PROCEDURE — 2500000003 HC RX 250 WO HCPCS: Performed by: PHYSICAL MEDICINE & REHABILITATION

## 2025-07-13 PROCEDURE — 6360000002 HC RX W HCPCS: Performed by: INTERNAL MEDICINE

## 2025-07-13 PROCEDURE — 99232 SBSQ HOSP IP/OBS MODERATE 35: CPT | Performed by: PHYSICAL MEDICINE & REHABILITATION

## 2025-07-13 PROCEDURE — 6370000000 HC RX 637 (ALT 250 FOR IP): Performed by: PHYSICAL MEDICINE & REHABILITATION

## 2025-07-13 RX ADMIN — SERTRALINE 25 MG: 25 TABLET, FILM COATED ORAL at 09:12

## 2025-07-13 RX ADMIN — ATORVASTATIN CALCIUM 80 MG: 80 TABLET, FILM COATED ORAL at 19:59

## 2025-07-13 RX ADMIN — DOCUSATE SODIUM 100 MG: 100 CAPSULE, LIQUID FILLED ORAL at 09:12

## 2025-07-13 RX ADMIN — Medication 400 MG: at 09:17

## 2025-07-13 RX ADMIN — NYSTATIN 500000 UNITS: 100000 SUSPENSION ORAL at 09:12

## 2025-07-13 RX ADMIN — CLOPIDOGREL BISULFATE 75 MG: 75 TABLET, FILM COATED ORAL at 09:12

## 2025-07-13 RX ADMIN — AMLODIPINE BESYLATE 10 MG: 10 TABLET ORAL at 09:13

## 2025-07-13 RX ADMIN — HYDROCORTISONE: 25 CREAM TOPICAL at 19:59

## 2025-07-13 RX ADMIN — Medication 5 MG: at 19:59

## 2025-07-13 RX ADMIN — Medication 2000 UNITS: at 17:32

## 2025-07-13 RX ADMIN — NYSTATIN 500000 UNITS: 100000 SUSPENSION ORAL at 19:59

## 2025-07-13 RX ADMIN — HYDROCODONE BITARTRATE AND ACETAMINOPHEN 1 TABLET: 10; 325 TABLET ORAL at 19:59

## 2025-07-13 RX ADMIN — DOCUSATE SODIUM 100 MG: 100 CAPSULE, LIQUID FILLED ORAL at 19:59

## 2025-07-13 RX ADMIN — CLONIDINE HYDROCHLORIDE 0.2 MG: 0.2 TABLET ORAL at 13:02

## 2025-07-13 RX ADMIN — ZOLPIDEM TARTRATE 5 MG: 5 TABLET ORAL at 19:59

## 2025-07-13 RX ADMIN — NYSTATIN 500000 UNITS: 100000 SUSPENSION ORAL at 13:02

## 2025-07-13 RX ADMIN — PANTOPRAZOLE SODIUM 40 MG: 40 TABLET, DELAYED RELEASE ORAL at 05:39

## 2025-07-13 RX ADMIN — MENTHOL: 0 POWDER TOPICAL at 09:15

## 2025-07-13 RX ADMIN — MICONAZOLE NITRATE: 20 POWDER TOPICAL at 09:15

## 2025-07-13 RX ADMIN — HYDROCODONE BITARTRATE AND ACETAMINOPHEN 1 TABLET: 10; 325 TABLET ORAL at 05:40

## 2025-07-13 RX ADMIN — NYSTATIN 500000 UNITS: 100000 SUSPENSION ORAL at 17:33

## 2025-07-13 RX ADMIN — ENOXAPARIN SODIUM 40 MG: 100 INJECTION SUBCUTANEOUS at 09:12

## 2025-07-13 RX ADMIN — HYDROCHLOROTHIAZIDE 25 MG: 25 TABLET ORAL at 09:13

## 2025-07-13 RX ADMIN — ANTACID TABLETS 500 MG: 500 TABLET, CHEWABLE ORAL at 09:12

## 2025-07-13 RX ADMIN — CEFUROXIME AXETIL 500 MG: 500 TABLET ORAL at 09:12

## 2025-07-13 RX ADMIN — CLONIDINE HYDROCHLORIDE 0.2 MG: 0.2 TABLET ORAL at 09:12

## 2025-07-13 RX ADMIN — CLONIDINE HYDROCHLORIDE 0.2 MG: 0.2 TABLET ORAL at 20:00

## 2025-07-13 RX ADMIN — Medication 100 MG: at 09:13

## 2025-07-13 ASSESSMENT — PAIN SCALES - GENERAL
PAINLEVEL_OUTOF10: 7
PAINLEVEL_OUTOF10: 5
PAINLEVEL_OUTOF10: 4

## 2025-07-13 ASSESSMENT — PAIN DESCRIPTION - DESCRIPTORS
DESCRIPTORS: ACHING
DESCRIPTORS_2: ACHING;SORE
DESCRIPTORS: ACHING
DESCRIPTORS: ACHING;SORE

## 2025-07-13 ASSESSMENT — PAIN DESCRIPTION - LOCATION
LOCATION_2: HIP
LOCATION: ARM
LOCATION: ARM
LOCATION: ARM;HAND

## 2025-07-13 ASSESSMENT — PAIN DESCRIPTION - ORIENTATION
ORIENTATION: LEFT
ORIENTATION: LEFT
ORIENTATION_2: LEFT
ORIENTATION: LEFT

## 2025-07-13 ASSESSMENT — PAIN DESCRIPTION - INTENSITY: RATING_2: 7

## 2025-07-13 ASSESSMENT — PAIN - FUNCTIONAL ASSESSMENT: PAIN_FUNCTIONAL_ASSESSMENT: PREVENTS OR INTERFERES SOME ACTIVE ACTIVITIES AND ADLS

## 2025-07-13 NOTE — PLAN OF CARE
Problem: Chronic Conditions and Co-morbidities  Goal: Patient's chronic conditions and co-morbidity symptoms are monitored and maintained or improved  Outcome: Progressing     Problem: Discharge Planning  Goal: Discharge to home or other facility with appropriate resources  Outcome: Progressing     Problem: Pain  Goal: Verbalizes/displays adequate comfort level or baseline comfort level  Outcome: Progressing     Problem: Safety - Adult  Goal: Free from fall injury  7/13/2025 1120 by Shalini Cole, RN  Outcome: Progressing  7/13/2025 0624 by Viji Carlisle RN  Outcome: Progressing     Problem: Skin/Tissue Integrity  Goal: Skin integrity remains intact  Description: 1.  Monitor for areas of redness and/or skin breakdown  2.  Assess vascular access sites hourly  3.  Every 4-6 hours minimum:  Change oxygen saturation probe site  4.  Every 4-6 hours:  If on nasal continuous positive airway pressure, respiratory therapy assess nares and determine need for appliance change or resting period  Outcome: Progressing

## 2025-07-13 NOTE — FLOWSHEET NOTE
Patient complains of left arm pain rated 4/10. Patient medicated with Norco  MG PO PRN for pain per request.

## 2025-07-13 NOTE — FLOWSHEET NOTE
Patient is resting in bed at this time and requesting medication to help her sleep. Patient complains of left arm pain rated 8/10. Patient medicated with Norco  MG PO PRN for pain. Patient medicated with Ambien 5 MG PO PRN for sleep. Patient took her evening medications whole at once with sips of water without difficulty. Patient declined an  snack. Patient repositioned in bed with several pillows in place for comfort. Patient requested a purewick with a depends in place at HS due to urinary incontinence. Patient states that she is comfortable and verbalized no further needs at this time. Patients bed alarm is engaged and call light is within reach.

## 2025-07-14 PROBLEM — G89.0 THALAMIC PAIN SYNDROME: Status: ACTIVE | Noted: 2025-07-14

## 2025-07-14 LAB
ANION GAP SERPL CALCULATED.3IONS-SCNC: 12 MEQ/L (ref 9–15)
BASOPHILS # BLD: 0.1 K/UL (ref 0–0.2)
BASOPHILS NFR BLD: 0.6 %
BUN SERPL-MCNC: 11 MG/DL (ref 6–20)
CALCIUM SERPL-MCNC: 9.8 MG/DL (ref 8.5–9.9)
CHLORIDE SERPL-SCNC: 91 MEQ/L (ref 95–107)
CO2 SERPL-SCNC: 32 MEQ/L (ref 20–31)
CREAT SERPL-MCNC: 0.57 MG/DL (ref 0.5–0.9)
EOSINOPHIL # BLD: 0.5 K/UL (ref 0–0.7)
EOSINOPHIL NFR BLD: 5.3 %
ERYTHROCYTE [DISTWIDTH] IN BLOOD BY AUTOMATED COUNT: 12.5 % (ref 11.5–14.5)
GLUCOSE SERPL-MCNC: 116 MG/DL (ref 70–99)
HCT VFR BLD AUTO: 45.7 % (ref 37–47)
HGB BLD-MCNC: 15.5 G/DL (ref 12–16)
LYMPHOCYTES # BLD: 2.4 K/UL (ref 1–4.8)
LYMPHOCYTES NFR BLD: 24.8 %
MCH RBC QN AUTO: 29.9 PG (ref 27–31.3)
MCHC RBC AUTO-ENTMCNC: 33.9 % (ref 33–37)
MCV RBC AUTO: 88.1 FL (ref 79.4–94.8)
MONOCYTES # BLD: 0.9 K/UL (ref 0.2–0.8)
MONOCYTES NFR BLD: 9.7 %
NEUTROPHILS # BLD: 5.7 K/UL (ref 1.4–6.5)
NEUTS SEG NFR BLD: 59 %
PLATELET # BLD AUTO: 451 K/UL (ref 130–400)
POTASSIUM SERPL-SCNC: 3.4 MEQ/L (ref 3.4–4.9)
RBC # BLD AUTO: 5.19 M/UL (ref 4.2–5.4)
SODIUM SERPL-SCNC: 135 MEQ/L (ref 135–144)
WBC # BLD AUTO: 9.7 K/UL (ref 4.8–10.8)

## 2025-07-14 PROCEDURE — 99232 SBSQ HOSP IP/OBS MODERATE 35: CPT | Performed by: PSYCHIATRY & NEUROLOGY

## 2025-07-14 PROCEDURE — 80048 BASIC METABOLIC PNL TOTAL CA: CPT

## 2025-07-14 PROCEDURE — 6370000000 HC RX 637 (ALT 250 FOR IP): Performed by: INTERNAL MEDICINE

## 2025-07-14 PROCEDURE — 97535 SELF CARE MNGMENT TRAINING: CPT

## 2025-07-14 PROCEDURE — 2700000000 HC OXYGEN THERAPY PER DAY

## 2025-07-14 PROCEDURE — 99233 SBSQ HOSP IP/OBS HIGH 50: CPT | Performed by: PHYSICAL MEDICINE & REHABILITATION

## 2025-07-14 PROCEDURE — 36415 COLL VENOUS BLD VENIPUNCTURE: CPT

## 2025-07-14 PROCEDURE — 97542 WHEELCHAIR MNGMENT TRAINING: CPT

## 2025-07-14 PROCEDURE — 6360000002 HC RX W HCPCS: Performed by: PHYSICAL MEDICINE & REHABILITATION

## 2025-07-14 PROCEDURE — 6370000000 HC RX 637 (ALT 250 FOR IP): Performed by: PHYSICAL MEDICINE & REHABILITATION

## 2025-07-14 PROCEDURE — 85025 COMPLETE CBC W/AUTO DIFF WBC: CPT

## 2025-07-14 PROCEDURE — 1180000000 HC REHAB R&B

## 2025-07-14 PROCEDURE — 97112 NEUROMUSCULAR REEDUCATION: CPT

## 2025-07-14 PROCEDURE — 97116 GAIT TRAINING THERAPY: CPT

## 2025-07-14 PROCEDURE — 97110 THERAPEUTIC EXERCISES: CPT

## 2025-07-14 PROCEDURE — 6370000000 HC RX 637 (ALT 250 FOR IP): Performed by: PSYCHIATRY & NEUROLOGY

## 2025-07-14 PROCEDURE — 6360000002 HC RX W HCPCS: Performed by: INTERNAL MEDICINE

## 2025-07-14 RX ORDER — ZOLPIDEM TARTRATE 5 MG/1
5 TABLET ORAL NIGHTLY PRN
Qty: 7 TABLET | Refills: 0 | Status: SHIPPED | OUTPATIENT
Start: 2025-07-14 | End: 2025-07-21

## 2025-07-14 RX ORDER — MECOBALAMIN 5000 MCG
5 TABLET,DISINTEGRATING ORAL NIGHTLY
Qty: 30 TABLET | Refills: 0 | Status: SHIPPED | OUTPATIENT
Start: 2025-07-14 | End: 2025-07-16

## 2025-07-14 RX ORDER — UBIDECARENONE 100 MG
100 CAPSULE ORAL DAILY
Qty: 120 CAPSULE | Refills: 5 | Status: SHIPPED | OUTPATIENT
Start: 2025-07-15 | End: 2025-07-16

## 2025-07-14 RX ORDER — LANOLIN ALCOHOL/MO/W.PET/CERES
400 CREAM (GRAM) TOPICAL DAILY
Qty: 30 TABLET | Refills: 5 | Status: SHIPPED | OUTPATIENT
Start: 2025-07-15 | End: 2025-07-16

## 2025-07-14 RX ORDER — POTASSIUM CHLORIDE 1500 MG/1
40 TABLET, EXTENDED RELEASE ORAL ONCE
Status: COMPLETED | OUTPATIENT
Start: 2025-07-14 | End: 2025-07-14

## 2025-07-14 RX ORDER — HYDROCODONE BITARTRATE AND ACETAMINOPHEN 10; 325 MG/1; MG/1
1 TABLET ORAL EVERY 4 HOURS PRN
Qty: 30 TABLET | Refills: 0 | Status: SHIPPED | OUTPATIENT
Start: 2025-07-14 | End: 2025-07-21

## 2025-07-14 RX ORDER — BUTALBITAL, ACETAMINOPHEN AND CAFFEINE 300; 40; 50 MG/1; MG/1; MG/1
1 CAPSULE ORAL EVERY 4 HOURS PRN
Qty: 84 CAPSULE | Refills: 0 | Status: SHIPPED | OUTPATIENT
Start: 2025-07-14

## 2025-07-14 RX ORDER — PANTOPRAZOLE SODIUM 40 MG/1
40 TABLET, DELAYED RELEASE ORAL
Qty: 30 TABLET | Refills: 3 | Status: SHIPPED | OUTPATIENT
Start: 2025-07-15 | End: 2025-07-16

## 2025-07-14 RX ORDER — CHOLECALCIFEROL (VITAMIN D3) 50 MCG
2000 TABLET ORAL
Qty: 60 TABLET | Refills: 5 | Status: SHIPPED | OUTPATIENT
Start: 2025-07-14 | End: 2025-07-16

## 2025-07-14 RX ORDER — SERTRALINE HYDROCHLORIDE 25 MG/1
25 TABLET, FILM COATED ORAL DAILY
Qty: 30 TABLET | Refills: 3 | Status: SHIPPED | OUTPATIENT
Start: 2025-07-15 | End: 2025-07-16

## 2025-07-14 RX ORDER — ATORVASTATIN CALCIUM 80 MG/1
80 TABLET, FILM COATED ORAL NIGHTLY
Qty: 30 TABLET | Refills: 3 | Status: SHIPPED | OUTPATIENT
Start: 2025-07-14 | End: 2025-07-16

## 2025-07-14 RX ADMIN — AMLODIPINE BESYLATE 10 MG: 10 TABLET ORAL at 08:57

## 2025-07-14 RX ADMIN — ENOXAPARIN SODIUM 40 MG: 100 INJECTION SUBCUTANEOUS at 08:57

## 2025-07-14 RX ADMIN — Medication 100 MG: at 08:57

## 2025-07-14 RX ADMIN — HYDROCODONE BITARTRATE AND ACETAMINOPHEN 1 TABLET: 10; 325 TABLET ORAL at 21:50

## 2025-07-14 RX ADMIN — HYDROCHLOROTHIAZIDE 25 MG: 25 TABLET ORAL at 08:57

## 2025-07-14 RX ADMIN — CLOPIDOGREL BISULFATE 75 MG: 75 TABLET, FILM COATED ORAL at 08:57

## 2025-07-14 RX ADMIN — MENTHOL: 0 POWDER TOPICAL at 11:48

## 2025-07-14 RX ADMIN — DOCUSATE SODIUM 100 MG: 100 CAPSULE, LIQUID FILLED ORAL at 21:50

## 2025-07-14 RX ADMIN — ATORVASTATIN CALCIUM 80 MG: 80 TABLET, FILM COATED ORAL at 21:50

## 2025-07-14 RX ADMIN — Medication 5 MG: at 21:50

## 2025-07-14 RX ADMIN — CLONIDINE HYDROCHLORIDE 0.2 MG: 0.2 TABLET ORAL at 08:56

## 2025-07-14 RX ADMIN — HYDROCODONE BITARTRATE AND ACETAMINOPHEN 1 TABLET: 10; 325 TABLET ORAL at 11:59

## 2025-07-14 RX ADMIN — HYDROCODONE BITARTRATE AND ACETAMINOPHEN 1 TABLET: 10; 325 TABLET ORAL at 05:35

## 2025-07-14 RX ADMIN — NYSTATIN 500000 UNITS: 100000 SUSPENSION ORAL at 16:43

## 2025-07-14 RX ADMIN — SERTRALINE 25 MG: 25 TABLET, FILM COATED ORAL at 08:58

## 2025-07-14 RX ADMIN — DOCUSATE SODIUM 100 MG: 100 CAPSULE, LIQUID FILLED ORAL at 08:57

## 2025-07-14 RX ADMIN — Medication 2000 UNITS: at 16:43

## 2025-07-14 RX ADMIN — HYDROCORTISONE: 25 CREAM TOPICAL at 22:08

## 2025-07-14 RX ADMIN — NYSTATIN 500000 UNITS: 100000 SUSPENSION ORAL at 08:56

## 2025-07-14 RX ADMIN — CYANOCOBALAMIN 1000 MCG: 1000 INJECTION, SOLUTION INTRAMUSCULAR; SUBCUTANEOUS at 08:56

## 2025-07-14 RX ADMIN — ZOLPIDEM TARTRATE 5 MG: 5 TABLET ORAL at 21:50

## 2025-07-14 RX ADMIN — NYSTATIN 500000 UNITS: 100000 SUSPENSION ORAL at 21:55

## 2025-07-14 RX ADMIN — CLONIDINE HYDROCHLORIDE 0.2 MG: 0.2 TABLET ORAL at 14:17

## 2025-07-14 RX ADMIN — ANTACID TABLETS 500 MG: 500 TABLET, CHEWABLE ORAL at 08:57

## 2025-07-14 RX ADMIN — NYSTATIN 500000 UNITS: 100000 SUSPENSION ORAL at 14:14

## 2025-07-14 RX ADMIN — PANTOPRAZOLE SODIUM 40 MG: 40 TABLET, DELAYED RELEASE ORAL at 05:35

## 2025-07-14 RX ADMIN — CLONIDINE HYDROCHLORIDE 0.2 MG: 0.2 TABLET ORAL at 21:50

## 2025-07-14 RX ADMIN — Medication 400 MG: at 08:57

## 2025-07-14 RX ADMIN — POTASSIUM CHLORIDE 40 MEQ: 1500 TABLET, EXTENDED RELEASE ORAL at 14:14

## 2025-07-14 ASSESSMENT — ENCOUNTER SYMPTOMS
NAUSEA: 0
WHEEZING: 0
VOMITING: 0
CHEST TIGHTNESS: 0
TROUBLE SWALLOWING: 1
COLOR CHANGE: 0
SHORTNESS OF BREATH: 0
COUGH: 0

## 2025-07-14 ASSESSMENT — PAIN DESCRIPTION - ORIENTATION
ORIENTATION: LEFT
ORIENTATION: LEFT

## 2025-07-14 ASSESSMENT — PAIN DESCRIPTION - DESCRIPTORS
DESCRIPTORS: DISCOMFORT
DESCRIPTORS: ACHING;DISCOMFORT

## 2025-07-14 ASSESSMENT — PAIN DESCRIPTION - LOCATION
LOCATION: ARM;HIP
LOCATION: ARM;HIP

## 2025-07-14 ASSESSMENT — PAIN SCALES - GENERAL: PAINLEVEL_OUTOF10: 5

## 2025-07-14 NOTE — PLAN OF CARE
Problem: Chronic Conditions and Co-morbidities  Goal: Patient's chronic conditions and co-morbidity symptoms are monitored and maintained or improved  Outcome: Progressing     Problem: Discharge Planning  Goal: Discharge to home or other facility with appropriate resources  Outcome: Progressing     Problem: Pain  Goal: Verbalizes/displays adequate comfort level or baseline comfort level  Outcome: Progressing     Problem: Safety - Adult  Goal: Free from fall injury  7/14/2025 1251 by Carmen Mixon, RN  Outcome: Progressing  7/14/2025 0045 by Viji Carlisle, RN  Outcome: Progressing     Problem: Skin/Tissue Integrity  Goal: Skin integrity remains intact  Description: 1.  Monitor for areas of redness and/or skin breakdown  2.  Assess vascular access sites hourly  3.  Every 4-6 hours minimum:  Change oxygen saturation probe site  4.  Every 4-6 hours:  If on nasal continuous positive airway pressure, respiratory therapy assess nares and determine need for appliance change or resting period  Outcome: Progressing

## 2025-07-14 NOTE — FLOWSHEET NOTE
Patient was in bed and requesting to use the toilet. Patient was able to get to the side of the bed to a sitting position with one assist. OPAL Hairston and this nurse assisted patient to get in the tremaine-steady to use the toilet. Patient does well pulling herself up to a standing position with her right arm. Patient was placed on the toilet and had a small soft BM. Patient needed assistance cleaning herself up with wipes. Anusol-HC rectal cream placed on large hemorrhoid. Patient pulled herself up on the tremaine-steady to a standing position with her right arm. Patient pushed to the bed in the tremaine-steady. Patient lowered to the bed and repositioned for comfort. Patient requested a purewick with a depends in place in case of incontinent episodes. Patient complains of pain to her left arm/hand and left hip rated 7/10. Patient is requesting medication to help her sleep. Patient medicated with Norco  MG PO PRN for pain. Patient medicated with Ambien 5 MG PO PRN for sleep. Patient took her evening medications whole at once with sips of water without difficulty. Patient declined an HS snack. Patient states she is comfortable and verbalized no further needs at this time. Patients bed alarm is engaged and call light is within reach.

## 2025-07-14 NOTE — PLAN OF CARE
Problem: Safety - Adult  Goal: Free from fall injury  7/14/2025 0045 by Viji Carlisle, RN  Outcome: Progressing  7/13/2025 1120 by Shalini Cole, RN  Outcome: Progressing

## 2025-07-15 DIAGNOSIS — I63.9 CEREBROVASCULAR ACCIDENT (CVA), UNSPECIFIED MECHANISM (HCC): ICD-10-CM

## 2025-07-15 DIAGNOSIS — G43.009 MIGRAINE WITHOUT AURA AND WITHOUT STATUS MIGRAINOSUS, NOT INTRACTABLE: ICD-10-CM

## 2025-07-15 PROCEDURE — 2500000003 HC RX 250 WO HCPCS: Performed by: PHYSICAL MEDICINE & REHABILITATION

## 2025-07-15 PROCEDURE — 92526 ORAL FUNCTION THERAPY: CPT

## 2025-07-15 PROCEDURE — 6360000002 HC RX W HCPCS: Performed by: INTERNAL MEDICINE

## 2025-07-15 PROCEDURE — 97533 SENSORY INTEGRATION: CPT

## 2025-07-15 PROCEDURE — 6370000000 HC RX 637 (ALT 250 FOR IP): Performed by: PSYCHIATRY & NEUROLOGY

## 2025-07-15 PROCEDURE — 97112 NEUROMUSCULAR REEDUCATION: CPT

## 2025-07-15 PROCEDURE — 1180000000 HC REHAB R&B

## 2025-07-15 PROCEDURE — 97535 SELF CARE MNGMENT TRAINING: CPT

## 2025-07-15 PROCEDURE — 6370000000 HC RX 637 (ALT 250 FOR IP): Performed by: INTERNAL MEDICINE

## 2025-07-15 PROCEDURE — 92507 TX SP LANG VOICE COMM INDIV: CPT

## 2025-07-15 PROCEDURE — 97032 APPL MODALITY 1+ESTIM EA 15: CPT

## 2025-07-15 PROCEDURE — 6370000000 HC RX 637 (ALT 250 FOR IP): Performed by: PHYSICAL MEDICINE & REHABILITATION

## 2025-07-15 PROCEDURE — 97116 GAIT TRAINING THERAPY: CPT

## 2025-07-15 RX ORDER — CLONIDINE HYDROCHLORIDE 0.2 MG/1
0.2 TABLET ORAL 3 TIMES DAILY
Qty: 90 TABLET | Refills: 0 | Status: SHIPPED | OUTPATIENT
Start: 2025-07-15 | End: 2025-07-16

## 2025-07-15 RX ORDER — HYDROCHLOROTHIAZIDE 25 MG/1
25 TABLET ORAL DAILY
Qty: 30 TABLET | Refills: 3 | Status: SHIPPED | OUTPATIENT
Start: 2025-07-16 | End: 2025-07-16

## 2025-07-15 RX ORDER — BUTALBITAL, ACETAMINOPHEN AND CAFFEINE 50; 325; 40 MG/1; MG/1; MG/1
CAPSULE ORAL
Qty: 84 CAPSULE | Refills: 0 | OUTPATIENT
Start: 2025-07-15

## 2025-07-15 RX ORDER — CLOPIDOGREL BISULFATE 75 MG/1
75 TABLET ORAL DAILY
Qty: 30 TABLET | Refills: 3 | Status: SHIPPED | OUTPATIENT
Start: 2025-07-16 | End: 2025-07-16

## 2025-07-15 RX ORDER — AMLODIPINE BESYLATE 10 MG/1
10 TABLET ORAL DAILY
Qty: 30 TABLET | Refills: 3 | Status: SHIPPED | OUTPATIENT
Start: 2025-07-16 | End: 2025-07-16

## 2025-07-15 RX ADMIN — ANTACID TABLETS 500 MG: 500 TABLET, CHEWABLE ORAL at 20:33

## 2025-07-15 RX ADMIN — HYDROCORTISONE: 25 CREAM TOPICAL at 22:47

## 2025-07-15 RX ADMIN — PANTOPRAZOLE SODIUM 40 MG: 40 TABLET, DELAYED RELEASE ORAL at 05:26

## 2025-07-15 RX ADMIN — AMLODIPINE BESYLATE 10 MG: 10 TABLET ORAL at 08:47

## 2025-07-15 RX ADMIN — PRAMOXINE HYDROCHLORIDE HYDROCORTISONE ACETATE 1 APPLICATOR: 100; 100 AEROSOL, FOAM TOPICAL at 22:46

## 2025-07-15 RX ADMIN — NYSTATIN 500000 UNITS: 100000 SUSPENSION ORAL at 15:04

## 2025-07-15 RX ADMIN — MICONAZOLE NITRATE: 20 POWDER TOPICAL at 20:39

## 2025-07-15 RX ADMIN — Medication 2000 UNITS: at 18:16

## 2025-07-15 RX ADMIN — CLOPIDOGREL BISULFATE 75 MG: 75 TABLET, FILM COATED ORAL at 08:47

## 2025-07-15 RX ADMIN — CLONIDINE HYDROCHLORIDE 0.2 MG: 0.2 TABLET ORAL at 08:46

## 2025-07-15 RX ADMIN — CLONIDINE HYDROCHLORIDE 0.2 MG: 0.2 TABLET ORAL at 20:33

## 2025-07-15 RX ADMIN — HYDROCODONE BITARTRATE AND ACETAMINOPHEN 1 TABLET: 10; 325 TABLET ORAL at 14:08

## 2025-07-15 RX ADMIN — HYDROCHLOROTHIAZIDE 25 MG: 25 TABLET ORAL at 08:47

## 2025-07-15 RX ADMIN — CLONIDINE HYDROCHLORIDE 0.2 MG: 0.2 TABLET ORAL at 15:04

## 2025-07-15 RX ADMIN — ATORVASTATIN CALCIUM 80 MG: 80 TABLET, FILM COATED ORAL at 20:33

## 2025-07-15 RX ADMIN — NYSTATIN 500000 UNITS: 100000 SUSPENSION ORAL at 08:47

## 2025-07-15 RX ADMIN — MICONAZOLE NITRATE: 20 POWDER TOPICAL at 14:13

## 2025-07-15 RX ADMIN — MENTHOL: 0 POWDER TOPICAL at 20:38

## 2025-07-15 RX ADMIN — SERTRALINE 25 MG: 25 TABLET, FILM COATED ORAL at 08:47

## 2025-07-15 RX ADMIN — ENOXAPARIN SODIUM 40 MG: 100 INJECTION SUBCUTANEOUS at 08:47

## 2025-07-15 RX ADMIN — DOCUSATE SODIUM 100 MG: 100 CAPSULE, LIQUID FILLED ORAL at 20:33

## 2025-07-15 RX ADMIN — DOCUSATE SODIUM 100 MG: 100 CAPSULE, LIQUID FILLED ORAL at 08:46

## 2025-07-15 RX ADMIN — NYSTATIN 500000 UNITS: 100000 SUSPENSION ORAL at 20:33

## 2025-07-15 RX ADMIN — HYDROCODONE BITARTRATE AND ACETAMINOPHEN 1 TABLET: 10; 325 TABLET ORAL at 20:33

## 2025-07-15 RX ADMIN — HYDROCODONE BITARTRATE AND ACETAMINOPHEN 1 TABLET: 10; 325 TABLET ORAL at 05:26

## 2025-07-15 RX ADMIN — Medication 5 MG: at 20:33

## 2025-07-15 RX ADMIN — Medication 400 MG: at 08:46

## 2025-07-15 RX ADMIN — NYSTATIN 500000 UNITS: 100000 SUSPENSION ORAL at 18:16

## 2025-07-15 RX ADMIN — ANTACID TABLETS 500 MG: 500 TABLET, CHEWABLE ORAL at 08:47

## 2025-07-15 RX ADMIN — MENTHOL: 0 POWDER TOPICAL at 14:14

## 2025-07-15 RX ADMIN — Medication 100 MG: at 08:46

## 2025-07-15 ASSESSMENT — PAIN SCALES - GENERAL
PAINLEVEL_OUTOF10: 5
PAINLEVEL_OUTOF10: 7
PAINLEVEL_OUTOF10: 0
PAINLEVEL_OUTOF10: 4
PAINLEVEL_OUTOF10: 0

## 2025-07-15 ASSESSMENT — PAIN DESCRIPTION - LOCATION
LOCATION: ARM;HAND
LOCATION: BACK
LOCATION: ARM

## 2025-07-15 ASSESSMENT — PAIN - FUNCTIONAL ASSESSMENT: PAIN_FUNCTIONAL_ASSESSMENT: PREVENTS OR INTERFERES SOME ACTIVE ACTIVITIES AND ADLS

## 2025-07-15 ASSESSMENT — PAIN DESCRIPTION - DESCRIPTORS
DESCRIPTORS: ACHING
DESCRIPTORS: ACHING;DISCOMFORT
DESCRIPTORS: ACHING

## 2025-07-15 ASSESSMENT — PAIN DESCRIPTION - ORIENTATION
ORIENTATION: LEFT
ORIENTATION: LEFT

## 2025-07-15 NOTE — FLOWSHEET NOTE
Patient is requesting to go to the toilet and try and have a BM. Patient was able to sit up to the side of the bed with one assist. OPAL Jean and this nurse assisted patient with the tremaine-steady. Patient was able to pull herself up to a standing position using her right arm. [facility] and patient was able to lower herself to the toilet independently. Patient had a small soft BM and urinated without difficulty. Patient did require assistance to clean her buttocks with wipes. Patient pushed back to bed in the tremaine-steady. OPAL Jean and this nurse Applied Anusol-HC 2.5% rectal cream to hemorrhoids. Applied a depends with an external purewick per patient request. Patient complains of left hip and left arm pain rated 5/10. Patient is requesting medication to help her sleep tonight. Patient medicated with Norco  MG PO PRN for pain. Patient medicated with Ambien 5 MG PO PRN for sleep. Patient took her medications whole at once with sips of water without difficulty. Patient declined an HS snack. Patient states she is comfortable and verbalized no further needs at this time. Patients bed alarm is engaged and call light is within reach.

## 2025-07-15 NOTE — PLAN OF CARE
Problem: Safety - Adult  Goal: Free from fall injury  7/15/2025 0119 by Viji Carlisle, RN  Outcome: Progressing  7/14/2025 1251 by Carmen Mixon, RN  Outcome: Progressing

## 2025-07-16 VITALS
TEMPERATURE: 98.1 F | OXYGEN SATURATION: 95 % | RESPIRATION RATE: 18 BRPM | HEART RATE: 68 BPM | WEIGHT: 202.2 LBS | BODY MASS INDEX: 34.52 KG/M2 | HEIGHT: 64 IN | DIASTOLIC BLOOD PRESSURE: 75 MMHG | SYSTOLIC BLOOD PRESSURE: 166 MMHG

## 2025-07-16 PROCEDURE — 97535 SELF CARE MNGMENT TRAINING: CPT

## 2025-07-16 PROCEDURE — 97116 GAIT TRAINING THERAPY: CPT

## 2025-07-16 PROCEDURE — 99231 SBSQ HOSP IP/OBS SF/LOW 25: CPT | Performed by: NURSE PRACTITIONER

## 2025-07-16 PROCEDURE — 2500000003 HC RX 250 WO HCPCS: Performed by: PHYSICAL MEDICINE & REHABILITATION

## 2025-07-16 PROCEDURE — 6370000000 HC RX 637 (ALT 250 FOR IP): Performed by: PHYSICAL MEDICINE & REHABILITATION

## 2025-07-16 PROCEDURE — 6370000000 HC RX 637 (ALT 250 FOR IP): Performed by: INTERNAL MEDICINE

## 2025-07-16 PROCEDURE — 6360000002 HC RX W HCPCS: Performed by: INTERNAL MEDICINE

## 2025-07-16 PROCEDURE — 6370000000 HC RX 637 (ALT 250 FOR IP): Performed by: PSYCHIATRY & NEUROLOGY

## 2025-07-16 PROCEDURE — 97140 MANUAL THERAPY 1/> REGIONS: CPT

## 2025-07-16 RX ORDER — MECOBALAMIN 5000 MCG
5 TABLET,DISINTEGRATING ORAL NIGHTLY
Qty: 30 TABLET | Refills: 0 | Status: SHIPPED | OUTPATIENT
Start: 2025-07-16

## 2025-07-16 RX ORDER — UBIDECARENONE 100 MG
100 CAPSULE ORAL DAILY
Qty: 120 CAPSULE | Refills: 5 | Status: SHIPPED | OUTPATIENT
Start: 2025-07-16

## 2025-07-16 RX ORDER — CHOLECALCIFEROL (VITAMIN D3) 50 MCG
2000 TABLET ORAL
Qty: 60 TABLET | Refills: 5 | Status: SHIPPED | OUTPATIENT
Start: 2025-07-16

## 2025-07-16 RX ORDER — LANOLIN ALCOHOL/MO/W.PET/CERES
400 CREAM (GRAM) TOPICAL DAILY
Qty: 30 TABLET | Refills: 5 | Status: SHIPPED | OUTPATIENT
Start: 2025-07-16

## 2025-07-16 RX ORDER — CLONIDINE HYDROCHLORIDE 0.2 MG/1
0.2 TABLET ORAL 3 TIMES DAILY
Qty: 90 TABLET | Refills: 0 | Status: SHIPPED | OUTPATIENT
Start: 2025-07-16 | End: 2025-08-15

## 2025-07-16 RX ORDER — PANTOPRAZOLE SODIUM 40 MG/1
40 TABLET, DELAYED RELEASE ORAL
Qty: 30 TABLET | Refills: 3 | Status: SHIPPED | OUTPATIENT
Start: 2025-07-16

## 2025-07-16 RX ORDER — HYDROCHLOROTHIAZIDE 25 MG/1
25 TABLET ORAL DAILY
Qty: 30 TABLET | Refills: 3 | Status: SHIPPED | OUTPATIENT
Start: 2025-07-16

## 2025-07-16 RX ORDER — SERTRALINE HYDROCHLORIDE 25 MG/1
25 TABLET, FILM COATED ORAL DAILY
Qty: 30 TABLET | Refills: 3 | Status: SHIPPED | OUTPATIENT
Start: 2025-07-16

## 2025-07-16 RX ORDER — CLOPIDOGREL BISULFATE 75 MG/1
75 TABLET ORAL DAILY
Qty: 30 TABLET | Refills: 3 | Status: SHIPPED | OUTPATIENT
Start: 2025-07-16

## 2025-07-16 RX ORDER — ATORVASTATIN CALCIUM 80 MG/1
80 TABLET, FILM COATED ORAL NIGHTLY
Qty: 30 TABLET | Refills: 3 | Status: SHIPPED | OUTPATIENT
Start: 2025-07-16

## 2025-07-16 RX ORDER — AMLODIPINE BESYLATE 10 MG/1
10 TABLET ORAL DAILY
Qty: 30 TABLET | Refills: 3 | Status: SHIPPED | OUTPATIENT
Start: 2025-07-16

## 2025-07-16 RX ADMIN — HYDROCODONE BITARTRATE AND ACETAMINOPHEN 1 TABLET: 10; 325 TABLET ORAL at 09:17

## 2025-07-16 RX ADMIN — MICONAZOLE NITRATE: 20 POWDER TOPICAL at 09:04

## 2025-07-16 RX ADMIN — CLONIDINE HYDROCHLORIDE 0.2 MG: 0.2 TABLET ORAL at 14:03

## 2025-07-16 RX ADMIN — SERTRALINE 25 MG: 25 TABLET, FILM COATED ORAL at 09:03

## 2025-07-16 RX ADMIN — DOCUSATE SODIUM 100 MG: 100 CAPSULE, LIQUID FILLED ORAL at 09:03

## 2025-07-16 RX ADMIN — PANTOPRAZOLE SODIUM 40 MG: 40 TABLET, DELAYED RELEASE ORAL at 05:31

## 2025-07-16 RX ADMIN — CLONIDINE HYDROCHLORIDE 0.2 MG: 0.2 TABLET ORAL at 09:03

## 2025-07-16 RX ADMIN — CLOPIDOGREL BISULFATE 75 MG: 75 TABLET, FILM COATED ORAL at 09:03

## 2025-07-16 RX ADMIN — PRAMOXINE HYDROCHLORIDE HYDROCORTISONE ACETATE 1 APPLICATOR: 100; 100 AEROSOL, FOAM TOPICAL at 09:04

## 2025-07-16 RX ADMIN — HYDROCHLOROTHIAZIDE 25 MG: 25 TABLET ORAL at 09:03

## 2025-07-16 RX ADMIN — ENOXAPARIN SODIUM 40 MG: 100 INJECTION SUBCUTANEOUS at 09:02

## 2025-07-16 RX ADMIN — NYSTATIN 500000 UNITS: 100000 SUSPENSION ORAL at 09:02

## 2025-07-16 RX ADMIN — NYSTATIN 500000 UNITS: 100000 SUSPENSION ORAL at 17:20

## 2025-07-16 RX ADMIN — Medication 100 MG: at 09:03

## 2025-07-16 RX ADMIN — AMLODIPINE BESYLATE 10 MG: 10 TABLET ORAL at 09:03

## 2025-07-16 RX ADMIN — Medication 400 MG: at 09:03

## 2025-07-16 RX ADMIN — HYDROCORTISONE: 25 CREAM TOPICAL at 09:04

## 2025-07-16 RX ADMIN — NYSTATIN 500000 UNITS: 100000 SUSPENSION ORAL at 13:02

## 2025-07-16 RX ADMIN — Medication 2000 UNITS: at 17:20

## 2025-07-16 RX ADMIN — ANTACID TABLETS 500 MG: 500 TABLET, CHEWABLE ORAL at 09:03

## 2025-07-16 RX ADMIN — MENTHOL: 0 POWDER TOPICAL at 09:05

## 2025-07-16 ASSESSMENT — ENCOUNTER SYMPTOMS
COLOR CHANGE: 0
SHORTNESS OF BREATH: 0
COUGH: 0
VOMITING: 0
WHEEZING: 0
CHEST TIGHTNESS: 0
TROUBLE SWALLOWING: 0
NAUSEA: 0

## 2025-07-16 ASSESSMENT — PAIN - FUNCTIONAL ASSESSMENT: PAIN_FUNCTIONAL_ASSESSMENT: PREVENTS OR INTERFERES SOME ACTIVE ACTIVITIES AND ADLS

## 2025-07-16 ASSESSMENT — PAIN DESCRIPTION - DESCRIPTORS: DESCRIPTORS: ACHING

## 2025-07-16 ASSESSMENT — PAIN DESCRIPTION - ORIENTATION: ORIENTATION: LEFT

## 2025-07-16 ASSESSMENT — PAIN SCALES - GENERAL: PAINLEVEL_OUTOF10: 5

## 2025-07-16 ASSESSMENT — PAIN DESCRIPTION - LOCATION: LOCATION: ARM

## 2025-07-16 NOTE — DISCHARGE INSTR - COC
SpO2 95%   BMI 34.71 kg/m²     Last documented pain score (0-10 scale): Pain Level: 5  Last Weight:   Wt Readings from Last 1 Encounters:   07/13/25 91.7 kg (202 lb 3.2 oz)     Mental Status:  oriented and alert    IV Access:  - None    Nursing Mobility/ADLs:  Walking   Assisted  Transfer  Assisted  Bathing  Assisted  Dressing  Assisted  Toileting  Assisted  Feeding  Independent  Med Admin  Assisted  Med Delivery   none    Wound Care Documentation and Therapy:        Elimination:  Continence:   Bowel: No  Bladder: No  Urinary Catheter: None   Colostomy/Ileostomy/Ileal Conduit: No       Date of Last BM: 7/14/25  No intake or output data in the 24 hours ending 07/16/25 1145  I/O last 3 completed shifts:  In: 1140 [P.O.:1140]  Out: 800 [Urine:800]    Safety Concerns:     History of Falls (last 30 days) and At Risk for Falls    Impairments/Disabilities:      Left arm flaccid    Nutrition Therapy:  Current Nutrition Therapy:   - Oral Diet:  General    Routes of Feeding: Oral  Liquids: Thin Liquids  Daily Fluid Restriction: no  Last Modified Barium Swallow with Video (Video Swallowing Test): not done    Treatments at the Time of Hospital Discharge:   Respiratory Treatments:   Oxygen Therapy:  is not on home oxygen therapy.  Ventilator:    - No ventilator support    Rehab Therapies: Physical Therapy and Occupational Therapy  Weight Bearing Status/Restrictions: No weight bearing restrictions  Other Medical Equipment (for information only, NOT a DME order):  wheelchair and walker  Other Treatments:     Patient's personal belongings (please select all that are sent with patient):  Glasses    RN SIGNATURE:  Electronically signed by Shalini Cole RN on 7/16/25 at 11:49 AM EDT    CASE MANAGEMENT/SOCIAL WORK SECTION    Inpatient Status Date: Admitted to inpatient on 6/14/25    Readmission Risk Assessment Score:  [facility] RISK OF UNPLANNED READMISSION 2.0             14.3 Total Score        Discharging to Facility/ Agency

## 2025-07-16 NOTE — PLAN OF CARE
Problem: Chronic Conditions and Co-morbidities  Goal: Patient's chronic conditions and co-morbidity symptoms are monitored and maintained or improved  7/15/2025 2135 by Haley Ambrosio RN  Outcome: Progressing  7/15/2025 1054 by Malorie Galvez RN  Outcome: Progressing     Problem: Discharge Planning  Goal: Discharge to home or other facility with appropriate resources  7/15/2025 2135 by Haley Ambrosio RN  Outcome: Progressing  7/15/2025 1054 by Malorie Galvez RN  Outcome: Progressing     Problem: Pain  Goal: Verbalizes/displays adequate comfort level or baseline comfort level  7/15/2025 2135 by Haley Ambrosio RN  Outcome: Progressing  7/15/2025 2135 by Haley Ambrosio RN  Outcome: Progressing  7/15/2025 1054 by Malorie Galvez RN  Outcome: Progressing     Problem: Safety - Adult  Goal: Free from fall injury  7/15/2025 2135 by Haley Ambrosio RN  Outcome: Progressing  7/15/2025 2135 by Haley Ambrosio RN  Outcome: Progressing  7/15/2025 1054 by Malorie Galvez RN  Outcome: Progressing     Problem: Skin/Tissue Integrity  Goal: Skin integrity remains intact  Description: 1.  Monitor for areas of redness and/or skin breakdown  2.  Assess vascular access sites hourly  3.  Every 4-6 hours minimum:  Change oxygen saturation probe site  4.  Every 4-6 hours:  If on nasal continuous positive airway pressure, respiratory therapy assess nares and determine need for appliance change or resting period  7/15/2025 2135 by Haley Ambrosio RN  Outcome: Progressing  7/15/2025 2135 by Haley Ambrosio RN  Outcome: Progressing  7/15/2025 1054 by Malorie Galvez RN  Outcome: Progressing

## 2025-07-16 NOTE — CARE COORDINATION
Case Management Initial Assessment        NAME:  Smooth Easley  ROOM: R261/R261-01  :  1975  DATE: 2025        Social Functional:  Social/Functional History  Lives With: Daughter (Leona (online college and does not work); has a French alvarez (on a leash) and four cats (dtr cares for))  Type of Home: House  Home Layout:  (living room, kitchen, and half bath on main floor, 4 steps between levels- split level- pts level is downstairs; shower on top floor, bathtub on bottom floor)  Entrance Stairs - Number of Steps: 4-5?- \"I would have to ask my dtr how many steps there are\"  Entrance Stairs - Rails: Both (can reach both at the same time)  Bathroom Shower/Tub: Tub only (jet tub with handheld shower)  Bathroom Toilet: Standard  Bathroom Equipment: Hand-held shower  Bathroom Accessibility: Walker accessible  Prior Level of Assist for ADLs: Independent  Prior Level of Assist for Homemaking: Independent  Meal Prep Responsibility: Primary  Laundry Responsibility: Primary (shares w/ dtr)  Cleaning Responsibility:  (shares w/ dtr)  Bill Paying/Finance Responsibility: Primary (auto pay)  Shopping Responsibility: Primary  Health Care Management:  (was not on any medications prior to hospitalization)  Prior Level of Assist for Transfers: Independent  Active : Yes  Mode of Transportation: SUV  Education: Patient completed high school  Occupation: Full time employment  Type of Occupation: 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 Patient questions answered appropriately. Explained discharge process. Patient stated understanding. Pt completed high school and works full time as the Housekeeping and  at [employer]. Her support system consists of her 21 y/o dtr Leona (who lives with her), 26 y/o dtr Janet 
 [facility]  INPATIENT REHABILITATION  TEAM CONFERENCE NOTE  Room: R254/R254-01  Admit Date: 2025       Date: 2025  Patient Name: Smooth Easley        MRN: [id number]    : 1975  (49 y.o.)  Gender: female        REHAB DIAGNOSIS:   Diagnosis: CVA    CO MORBIDITIES:      Past Medical History:   Diagnosis Date    Lumbosacral disc disease     Osteoarthritis      Past Surgical History:   Procedure Laterality Date    CHOLECYSTECTOMY          Restrictions  Restrictions/Precautions: Fall Risk, Modified Diet  CASE MANAGEMENT    Social/Functional History  Social/Functional History  Lives With: Daughter (Leona (online [employer] and does not work); has a Citizen of [place] alvarez (on a leash) and four cats (dtr cares for))  Type of Home: House  Home Layout:  (living room, kitchen, and half bath on main floor, 4 steps between levels- split level- pts level is downstairs; shower on top floor, bathtub on bottom floor)  Home Access: Stairs to enter with rails  Entrance Stairs - Number of Steps: 4-5?- \"I would have to ask my dtr how many steps there are\"  Entrance Stairs - Rails: Both (can reach both at the same time)  Bathroom Shower/Tub: Tub only (jet tub with handheld shower)  Bathroom Toilet: Standard  Bathroom Equipment: Hand-held shower  Bathroom Accessibility: Walker accessible  Home Equipment: None  Has the patient had two or more falls in the past year or any fall with injury in the past year?: No  Prior Level of Assist for ADLs: Independent  Prior Level of Assist for Homemaking: Independent  Meal Prep Responsibility: Primary  Laundry Responsibility: Primary (shares w/ dtr)  Cleaning Responsibility:  (shares w/ dtr)  Bill Paying/Finance Responsibility: Primary (auto pay)  Shopping Responsibility: Primary  Dependent Care Responsibility: Primary  Health Care Management:  (was not on any medications prior to hospitalization)  Prior Level of Assist for Ambulation: Independent household ambulator, with or 
 [facility]  INPATIENT REHABILITATION  TEAM CONFERENCE NOTE  Room: R254/R254-01  Admit Date: 2025       Date: 2025  Patient Name: Smooth Easley        MRN: [id number]    : 1975  (49 y.o.)  Gender: female        REHAB DIAGNOSIS:   Diagnosis: CVA    CO MORBIDITIES:      Past Medical History:   Diagnosis Date    Lumbosacral disc disease     Osteoarthritis      Past Surgical History:   Procedure Laterality Date    CHOLECYSTECTOMY          Restrictions  Restrictions/Precautions: Fall Risk, Modified Diet  CASE MANAGEMENT    Social/Functional History  Social/Functional History  Lives With: Daughter (Leona (online [employer] and does not work); has a Citizen of the [place] alvarez (on a leash) and four cats (dtr cares for))  Type of Home: House  Home Layout:  (living room, kitchen, and half bath on main floor, 4 steps between levels- split level- pts level is downstairs; shower on top floor, bathtub on bottom floor)  Home Access: Stairs to enter with rails  Entrance Stairs - Number of Steps: 4-5?- \"I would have to ask my dtr how many steps there are\"  Entrance Stairs - Rails: Both (can reach both at the same time)  Bathroom Shower/Tub: Tub only (jet tub with handheld shower)  Bathroom Toilet: Standard  Bathroom Equipment: Hand-held shower  Bathroom Accessibility: Walker accessible  Home Equipment: None  Has the patient had two or more falls in the past year or any fall with injury in the past year?: No  Prior Level of Assist for ADLs: Independent  Prior Level of Assist for Homemaking: Independent  Meal Prep Responsibility: Primary  Laundry Responsibility: Primary (shares w/ dtr)  Cleaning Responsibility:  (shares w/ dtr)  Bill Paying/Finance Responsibility: Primary (auto pay)  Shopping Responsibility: Primary  Dependent Care Responsibility: Primary  Health Care Management:  (was not on any medications prior to hospitalization)  Prior Level of Assist for Ambulation: Independent household ambulator, with or 
 [facility]  INPATIENT REHABILITATION  TEAM CONFERENCE NOTE  Room: R254/R254-01  Admit Date: 2025       Date: 2025  Patient Name: Smooth Easley        MRN: [id number]    : 1975  (49 y.o.)  Gender: female        REHAB DIAGNOSIS:   Diagnosis: CVA    CO MORBIDITIES:      Past Medical History:   Diagnosis Date    Osteoarthritis      Past Surgical History:   Procedure Laterality Date    CHOLECYSTECTOMY          Restrictions  Restrictions/Precautions: Fall Risk, Modified Diet  CASE MANAGEMENT    Social/Functional History  Social/Functional History  Lives With: Daughter (Leona (online college and does not work); has a Kinyarwanda alvarez (on a leash) and four cats (dtr cares for))  Type of Home: House  Home Layout:  (living room, kitchen, and half bath on main floor, 4 steps between levels- split level- pts level is downstairs; shower on top floor, bathtub on bottom floor)  Home Access: Stairs to enter with rails  Entrance Stairs - Number of Steps: 4-5?- \"I would have to ask my dtr how many steps there are\"  Entrance Stairs - Rails: Both (can reach both at the same time)  Bathroom Shower/Tub: Tub only (jet tub with handheld shower)  Bathroom Toilet: Standard  Bathroom Equipment: Hand-held shower  Bathroom Accessibility: Walker accessible  Home Equipment: None  Has the patient had two or more falls in the past year or any fall with injury in the past year?: No  Prior Level of Assist for ADLs: Independent  Prior Level of Assist for Homemaking: Independent  Meal Prep Responsibility: Primary  Laundry Responsibility: Primary (shares w/ dtr)  Cleaning Responsibility:  (shares w/ dtr)  Bill Paying/Finance Responsibility: Primary (auto pay)  Shopping Responsibility: Primary  Dependent Care Responsibility: Primary  Health Care Management:  (was not on any medications prior to hospitalization)  Prior Level of Assist for Ambulation: Independent household ambulator, with or without device, Independent 
CM spoke with patient and discussed current progress and updates. No concerns at this time from patient. CM left a message for daughter Leona to update as well, no return call at this time. Electronically signed by Haley Davison RN on 6/24/2025 at 4:01 PM    
FAHADW received a voicemail from [employer] explaining that the 20\" wheelchair and 30\" slideboard were ordered and are set to be delivered on Friday 7/11. FAHADW relayed this to pt and dtr who were both pleased. Family training is also scheduled for a full ADL on Thursday 7/10 at 10:30AM.  Electronically signed by JANE Martinez LSW on 7/8/2025 at 3:53 PM    
LSW called pt's dtr Leona via [phone] and left a voicemail requesting a return call to discuss transportation for tomorrow. Electronically signed by JANE Martinez, ALDO on 7/15/2025 at 4:08 PM    
LSW met with pt and dtr Leona, and discussed discharge date and goals. Both are aware that pt is not goaled for independence at this time, but are hopeful she will surpass goals. LSW explained that depending on pt's progress, her goals may be upgraded and d/c date can change, pt and fam verbalized understanding. Family training will be scheduled closer to d/c and dtr is agreeable. Pt remains motivated and hopeful. [place] paperwork was also discussed and it is being sent over for rehab to complete.  Electronically signed by JANE Martinez, FAHADW on 6/17/2025 at 4:48 PM    
LSW met with pt and dtr, and discussed upcoming discharge. Dtr stated that pt has a friend that is a STNA that does not work and will be able to provide physical assistance at discharge. Friend will be in today at 1PM for impromptu family training w/ dtr as well. FMLA paperwork was discussed as pt was approved for it, and pt is concerned as she is not getting paid while off since she used up all of her PTO. Pt did not pay into insurance and is not sure if she paid into Short Term Disability. Dtr plans to call pt's boss to check. LSW printed out a list of resources for financial assistance with bills. Home set up was also discussed and it appears pt has a two level home with a basement vs a split level. Pt will be staying on the first floor with a bathroom and bedroom. Dtr is trying to work on getting a ramp installed as there are stairs to enter the home. LSW will provide contact info for a company that assists with ramps. Electronically signed by JANE Martinez LSW on 7/3/2025 at 4:06 PM    LSW provided a list of resources for financial assistance and ramp rentals to pt. Pt confirmed she will give it to her dtr. Electronically signed by JANE Martinez LSW on 7/3/2025 at 5:18 PM    
LSW met with pt this morning to confirm she feels ready to discharge home. Pt is excited to return home today as it is also her birthday. Pt did not work on stairs yesterday and is agreeable for LSW setting up transport via ambulance. LSW did notify pt that if she is denied for medicaid, they will bill her for the transport. HHC vs OP was also discussed. Due to not having a ramp yet and stairs being a barrier, pt would prefer HHC. Given freedom of choice, pt chose [facility]. LSW did note that an LSW from [facility] would fill out financial assistance paperwork and review the private pay rates once home, to open the case. Pt verbalized understanding. Electronically signed by JANE Martinez LSW on 7/16/2025 at 8:48 AM    LSW also spoke with pt's dtr Leona and she reported that they will be receiving a stair chair for pt to use by the end of this week. Pt's friend that is an ROD was in around 11:30AM to attempt stair bumping and it was found that he could not complete it by himself and that Leona (dtr) could not assist either. Therefore, pt will need an ambulance ride home. Pt was agreeable for LSW setting up a PCP appt for Monday 7/16 at 1:30PM. LSW notified Ruiz at [facility] of the appointment and they plan to see pt afterwards. Transport was arranged with [facility] for a  of 6PM via cot due to stairs to enter home. Electronically signed by JANE Martinez LSW on 7/16/2025 at 3:56 PM    
LSW spoke with Natalia from [employer]/billing this morning and pt is medicaid pending. Pt confirmed she has no additional income coming in at this time. LSW called pt's employer ([employer]) and confirmed that pt does not qualify for short term disability, as she has not paid into that benefit. LSW did inquire if they had a 20\" wheelchair and 30\" slideboard that they could let pt borrow at discharge. [employer] is not sure if they have both on hand, but will obtain it for pt and have it delivered to her home. LSW will update pt. Electronically signed by JANE Martinez LSW on 7/8/2025 at 10:49 AM     LSW met with pt and dtr, and relayed what was discussed with Cindy, both are appreciative of DME being provided by pt's employer. LSW also explained that the discharge date was moved to 7/16 and they are in agreement, noting that is patient's birthday. LSW also discussed having dtr stay the night to simulate how being at home will be, while having the assistance available from staff. Both are agreeable, room TBD. Electronically signed by JANE Martinez LSW on 7/8/2025 at 11:21 AM    
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 Dog goes out on a leash to potty.  Shares all care with her daughter  Additional Comments: pt is rarely home alone       Pts personal preferences: likes pop    Pts assets/resources/support system: two dtrs, friends/neighbors    COVERAGE INFORMATION:Payor: /       NURSING  No active isolations    Weight - Scale: 94.9 kg (209 lb 4.8 oz) / Body mass index is 35.93 kg/m².    ADULT DIET; Dysphagia - Pureed; Mildly Thick (Nectar); no straws    SpO2: 98 % (06/16/25 0806)    Oxygen to be continued upon discharge: No  Home-going needs (nocturnal Pox, sleep study, RX, equipment): No    Skin Issues: No  Home-going needs (education, training, RX, Wound RN): No    Pain Managed: Yes    Bladder continence: Yes  Discharging with Lebron: No   Training done: No   Urology following: No   Plan/date to remove lebron: No   Bladder retraining started: No    Bowel continence:  Yes  Last BM date: 6/15  Need for bowel program: No    Anticoagulants: plavix and lovenox  Home-going needs (Education, labs): No    Antibiotics: none  Stop date: n/a  Home-going needs (education, RX, PICC): No      Other: cloudy urine with odor--UA ordered  Minced and moist, thin liquids, no straws trial went well--now ordered      PHYSICAL THERAPY  Bed mobility:  Bed mobility  Rolling to Left: Substantial/Maximal assistance (06/15/25 0842)  Rolling to Right: Substantial/Maximal assistance (06/15/25 0842)  Supine to Sit: Substantial/Maximal assistance (06/15/25 0842)  Sit to Supine: 
0904)  Description/ Details: pt exhibiting difficulty- therefore distance limited (06/15/25 0904)  Distance: 10ft (06/15/25 0904)  Wheelchair  Surface: Level surface (06/27/25 1034)  Device: Standard wheelchair (06/27/25 1034)  Additional Factors: Verbal cues;Hand placement cues;Increased time to complete (06/27/25 1034)  Assistance Required to Manage Parts: All wheelchair parts (06/27/25 1034)  Assistance Level for Propulsion: Minimal assistance (06/27/25 1034)  Propulsion Method: Right upper extremity;Right lower extremity (06/27/25 1034)  Propulsion Quality: Slow velocity;Short strokes;Veers left;Decreased fluidity (06/27/25 1034)  Propulsion Distance: 150' (06/27/25 1034)  Skilled Clinical Factors: slight improvement noted as opposed to previous session, Marylou required to maintain straight path and constant cues required for efficient use of RLE and RUE in cohesion to assist with completing. L neglect noted. (06/27/25 1034)  LTG:  Long Term Goal 1: Pt to complete bed mobility with SBA  Long Term Goal 2: Pt to complete transfers with SBA  Long Term Goal 3: Pt to ambulate 10-25ft with LRD and Marylou  Long Term Goal 4: Pt to maintain midline stand unsupport >/= 1 min with SBA  Long Term Goal 5: Pt will demonstrate w/c mobililty >/= 50ft SBA  PT Treatment Time:  1.0 hrs      OCCUPATIONAL THERAPY    EVALUATION SELF CARE STATUS:  Hand Dominance: Right  Feeding: Minimal assistance (06/15/25 1243)  Feeding Skilled Clinical Factors: Patient required all food and drinks moved to the right side of the tray for her to locate them. Patient able to use utensils and drink from cups but her daughter fed her portions of the meal (06/15/25 1243)  Grooming: Minimal assistance (06/15/25 1243)  UE Bathing: Moderate assistance (06/15/25 1243)  LE Bathing: Maximum assistance (06/15/25 1243)  LE Bathing Skilled Clinical Factors: Patient able to wash her legs but required dependent assistance for chaparro areas and her feet (06/15/25 1243)  UE

## 2025-07-16 NOTE — DISCHARGE SUMMARY
narcotics.  Controlled Substance Monitoring:    Acute and Chronic Pain Monitoring:   RX Monitoring Acute Pain Prescriptions Periodic Controlled Substance Monitoring Chronic Pain > 50 MEDD   7/14/2025   9:47 AM Prescription exceeds daily limit for a specific reason. See comments or note.;Severe pain not adequately treated with lower dose.;Not required given exclusionary diagnoses... Possible medication side effects, risk of tolerance/dependence & alternative treatments discussed.;No signs of potential drug abuse or diversion identified.;Assessed functional status (ability to engage in work or other purposeful activities, the pain intensity and its interference with activities of daily living, quality of family life and social activities, and the physical activity);Obtaining appropriate analgesic effect of treatment. Re-evaluated the status of the patient's underlying condition causing pain.               ROS x10:  The patient also complains of severely impaired mobility and activities of daily living.  Otherwise no new problems with vision, hearing, nose, mouth, throat, dermal, cardiovascular, GI, , pulmonary, musculoskeletal, psychiatric or neurological. See Rehab H&P on Rehab chart dated .       Vital signs:  BP (!) 165/65   Pulse 68   Temp 98.1 °F (36.7 °C) (Oral)   Resp 18   Ht 1.626 m (5' 4\")   Wt 91.7 kg (202 lb 3.2 oz)   SpO2 95%   BMI 34.71 kg/m²   I/O:   PO/Intake:  fair PO intake, no problems observed or reported-upgraded to soft with bite-size no straws thin liquid  Bowel/Bladder: Constipated improving.  Recurrent UTI-SP p.o. Ceftin-purewick  General:  Patient is well developed, adequately nourished, non-obese and     well kempt.     HEENT:    Left facial droop improving PERRLA, hearing intact to loud voice, external inspection of ear     and nose benign.  Inspection of lips, tongue and gums  -oral thrush improved  Musculoskeletal: No significant change in strength or tone.  All joints stable.

## 2025-07-17 NOTE — PROGRESS NOTES
Occupational Therapy Get up and Go Note            Date: 2025  Patient Name: Smooth Easley        MRN: [id number]    Account #: [id number]  : 1975  (49 y.o.)      Subjective:  Patient states:  \"I love coffee\"  Pain:  Pain at start of treatment: No    Pain at end of treatment: No        Objective:  Assisted patient to improved sitting position in the bed for breakfast. Food was set up on the tray for patient including opening all items and placing it where patient could locate it. After set up patient was able to feed herself with the modified diet and one verbal cue to remember to clear pocketing           Treatment consisted of:   [x] ADL Training  [] Strengthening   [] Transfer Training    [] DME Education  [] HEP   [] Patient Education  [] Other:    Safety:  Safety Devices  Safety Devices in place:   Type of devices: All fall risk precautions in place      Therapy Time:   Individual Group Co-Treat   Time In 0731       Time Out 0750         Minutes 19             ADL/IADL trainin minutes         Electronically signed by:    YOHAN Leslie    2025, 7:54 AM  
                                                                              Occupational Therapy Rehab note            Date: 2025  Patient Name: Smooth Easley        MRN: [id number]    Account #: [id number]  : 1975  (50 y.o.)      Subjective:  Patient states:  \"He brought me lunch\" Patient reporting to her friend bringing her [employer] which she had just started eating when therapist arrived for a non scheduled therapy session.    Pain:  Pain at start of treatment: No    Pain at end of treatment: No      Objective:  ADL:  Feeding:  set up    Patient was issued a written PROM by another person HEP. Phone number and name of therapist were provided on the HEP for any follow up questions.     Treatment consisted of:   [] ADL Training  [] Strengthening   [] Transfer Training    [] DME Education  [x] HEP   [] Patient Education  [] Other:    Safety:  Safety Devices  Safety Devices in place:   Type of devices: All fall risk precautions in place      Therapy Time:   Individual Group Co-Treat   Time In 1110       Time Out 1115         Minutes 5             Neuromuscular reeducation: 5 minutes         Electronically signed by:    YOHAN Leslie    2025, 11:44 AM  
               [facility]  BEHAVIORAL HEALTH FOLLOW-UP NOTE       7/10/2025     Patient was seen and examined in person, Chart reviewed   Patient's case discussed with staff/team    Chief Complaint: Depression    Interim History:     Pt report feeling better with mood  Medication helping her  Getting stronger physically  Appetite:   [x] Normal/Unchanged  [] Increased  [] Decreased      Sleep:       [] Normal/Unchanged  [x] Fair       [] Poor              Energy:    [x] Normal/Unchanged  [] Increased  [] Decreased        SI [] Present  [x] Absent    HI  []Present  [x] Absent     Aggression:  [] yes  [x] no    Patient is [] able  [] unable to CONTRACT FOR SAFETY     PAST MEDICAL/PSYCHIATRIC HISTORY:   Past Medical History:   Diagnosis Date    Lumbosacral disc disease     Osteoarthritis        FAMILY/SOCIAL HISTORY:  History reviewed. No pertinent family history.  Social History     Socioeconomic History    Marital status: Single     Spouse name: Not on file    Number of children: Not on file    Years of education: Not on file    Highest education level: Not on file   Occupational History    Not on file   Tobacco Use    Smoking status: Every Day     Current packs/day: 0.50     Types: Cigarettes    Smokeless tobacco: Never   Substance and Sexual Activity    Alcohol use: No    Drug use: Never    Sexual activity: Not Currently   Other Topics Concern    Not on file   Social History Narrative    Lives With: Daughter    Type of Home: [facility] [address] [place] [facility]         Home Layout: Multi-level, Laundry in basement, Bed/Bath upstairs (4 steps between levels- split level- pts level is downstairs)    Home Access: Stairs to enter with rails - Number of Steps: 4-5?- \"I would have to ask my dtr how many steps there are\" - Rails: Left    Bathroom Shower/Tub: Tub/Shower unit    Bathroom Equipment: None    Home Equipment: None    Has the patient had two or more falls in the past year or any fall with injury in 
       Subjective:  The patient complains of moderate acute on chronic progressive fatigue and weakness  partially relieved by rest, PT, OT and meds   and exacerbated by exertion and recent  .      I am concerned about patient’s medical complexities including:  Active Problems:    * No active hospital problems. *  Resolved Problems:    * No resolved hospital problems. *      .    Controlled Substance Monitoring:    Acute and Chronic Pain Monitoring:        No data to display                    Reviewed recent nursing note and discussed current status and planned care with acute care providers, \"see notes  \".    ROS x10:  The patient also complains of severely impaired mobility and activities of daily living.  Otherwise no new problems with vision, hearing, nose, mouth, throat, dermal, cardiovascular, GI, , pulmonary, musculoskeletal, psychiatric or neurological.        Vital signs:  BP (!) 151/80   Pulse 88   Temp 98.2 °F (36.8 °C) (Oral)   Resp 15   SpO2 93%   I/O:   PO/Intake:    fair PO intake, monitoring for dysphagia    Bowel/Bladder:   continent,    General:  Patient is well developed, adequately nourished, and    well kempt.     HEENT:    PERRLA, hearing intact to loud voice, external inspection of ear and nose benign.  Inspection of lips, tongue and gums benign  Musculoskeletal: No significant change in strength or tone.  All joints stable.      Inspection and palpation of digits and nails show no clubbing, cyanosis or inflammatory conditions.   Neuro/Psychiatric: Affect: flat-  Alert and oriented to self and situation with   cues.  No significant change in deep tendon reflexes or sensation  Lungs:  Diminished, CTA-B  .  Respiration effort is normal at rest.   Heart:   S1 = S2,   RRR.      Abdomen:  Soft, non-tender    Extremities:  Trace  lower extremity edema but no unusual tenderness.  Skin:   BUE bruises dt blood draws      Rehabilitation:  Physical Therapy:   Bed mobility:  Bed mobility  Rolling 
      [facility] Occupational Therapy      Date: 2025  Patient Name: Smooth Easley        MRN: [id number]  Account: [id number]   : 1975  (49 y.o.)  Room: [place] [address]    Chart reviewed, attempted OT at 1030 for scheduled session (60 minutes). Patient not seen 2° to:    Pt. declined, stating: Patient experiencing emesis and requested to miss OT this morning. Will attempt again this afternoon.    Spoke to RN, RN aware. Will attempt again when able.    Electronically signed by HANDY Gonzalez on 2025 at 11:15 AM    
    Hospitalist Progress Note      PCP: Diana Gutierrez PA    Date of Admission: 2025    Chief Complaint: Weakness    Subjective: 12 systems review are all negative except the following.    Patient was seen and examined today.  Patient had an episode of nausea and vomiting earlier today.  She denied abdominal pain.  No more nausea currently and she was able to tolerate clear liquid.      Medications:  Reviewed    Infusion Medications    sodium chloride      sodium chloride       Scheduled Medications    ondansetron (ZOFRAN) 8 mg, dexAMETHasone (DECADRON) 12 mg in sodium chloride 0.9 % 50 mL IVPB  8 mg IntraVENous Once    lactulose  20 g Oral BID    hydroCHLOROthiazide  25 mg Oral Daily    pantoprazole  40 mg Oral QAM AC    calcium carbonate  500 mg Oral BID    amLODIPine  10 mg Oral Daily    nystatin  5 mL Oral 4x Daily    cloNIDine  0.2 mg Oral TID    sertraline  25 mg Oral Daily    melatonin  5 mg Oral Nightly    Vitamin D  2,000 Units Oral Dinner    cyanocobalamin  1,000 mcg IntraMUSCular Weekly    coenzyme Q10  100 mg Oral Daily    lidocaine  3 patch TransDERmal Daily    enoxaparin  40 mg SubCUTAneous Daily    atorvastatin  80 mg Oral Nightly    clopidogrel  75 mg Oral Daily     PRN Meds: HYDROmorphone, butalbital-APAP-caffeine, dextromethorphan-guaiFENesin, bisacodyl, sodium phosphate, sodium chloride, acetaminophen      Intake/Output Summary (Last 24 hours) at 2025 1152  Last data filed at 2025 0703  Gross per 24 hour   Intake --   Output 1050 ml   Net -1050 ml       Exam:  BP (!) 197/83   Pulse 84   Temp 97.7 °F (36.5 °C) (Oral)   Resp 18   Ht 1.626 m (5' 4\")   Wt 92.7 kg (204 lb 4.8 oz)   SpO2 94%   BMI 35.07 kg/m²     Average, Min, and Max for last 24 hours Vitals:  TEMPERATURE:  Temp  Av.2 °F (36.8 °C)  Min: 97.7 °F (36.5 °C)  Max: 98.6 °F (37 °C)    RESPIRATIONS RANGE: Resp  Av  Min: 16  Max: 18    PULSE RANGE: Pulse  Av.6  Min: 72  Max: 84    BLOOD PRESSURE RANGE:  
    Hospitalist Progress Note      PCP: Diana Gutierrez PA    Date of Admission: 6/14/2025    Chief Complaint: weakness    Subjective: patient back from PT    Medications:  Reviewed    Infusion Medications    sodium chloride       Scheduled Medications    menthol   Topical BID    hydrocortisone   Rectal BID    hydrocortisone   Topical BID    cefUROXime  500 mg Oral 2 times per day    docusate sodium  100 mg Oral BID    miconazole   Topical BID    Hydrocort-Pramoxine (Perianal)  1 applicator Rectal BID    magnesium oxide  400 mg Oral Daily    hydroCHLOROthiazide  25 mg Oral Daily    pantoprazole  40 mg Oral QAM AC    calcium carbonate  500 mg Oral BID    amLODIPine  10 mg Oral Daily    nystatin  5 mL Oral 4x Daily    cloNIDine  0.2 mg Oral TID    sertraline  25 mg Oral Daily    melatonin  5 mg Oral Nightly    Vitamin D  2,000 Units Oral Dinner    cyanocobalamin  1,000 mcg IntraMUSCular Weekly    coenzyme Q10  100 mg Oral Daily    lidocaine  3 patch TransDERmal Daily    enoxaparin  40 mg SubCUTAneous Daily    atorvastatin  80 mg Oral Nightly    clopidogrel  75 mg Oral Daily     PRN Meds: HYDROcodone-acetaminophen, zolpidem, lactulose, HYDROmorphone, butalbital-APAP-caffeine, dextromethorphan-guaiFENesin, bisacodyl, sodium phosphate, sodium chloride, acetaminophen      Intake/Output Summary (Last 24 hours) at 7/10/2025 1050  Last data filed at 7/10/2025 0640  Gross per 24 hour   Intake 200 ml   Output 600 ml   Net -400 ml       Exam:    BP (!) 175/90   Pulse 69   Temp 97.5 °F (36.4 °C) (Oral)   Resp 16   Ht 1.626 m (5' 4\")   Wt 92.7 kg (204 lb 4.8 oz)   SpO2 93%   BMI 35.07 kg/m²     General appearance: No apparent distress, appears stated age and cooperative.  HEENT: Pupils equal, round, and reactive to light. Conjunctivae/corneas clear.  Neck: Supple, with full range of motion. No jugular venous distention. Trachea midline.  Respiratory:  Normal respiratory effort. Clear to auscultation, bilaterally 
    Hospitalist Progress Note      PCP: Diana Gutierrez PA    Date of Admission: 6/14/2025    Chief Complaint: weakness    Subjective: patient eating lunch    Medications:  Reviewed    Infusion Medications    sodium chloride       Scheduled Medications    cloNIDine  0.1 mg Oral TID    sertraline  25 mg Oral Daily    melatonin  5 mg Oral Nightly    Vitamin D  2,000 Units Oral Dinner    cyanocobalamin  1,000 mcg IntraMUSCular Weekly    coenzyme Q10  100 mg Oral Daily    lidocaine  3 patch TransDERmal Daily    hydrALAZINE  25 mg Oral 2 times per day    enoxaparin  40 mg SubCUTAneous Daily    atorvastatin  80 mg Oral Nightly    clopidogrel  75 mg Oral Daily     PRN Meds: bisacodyl, sodium phosphate, sodium chloride, acetaminophen      Intake/Output Summary (Last 24 hours) at 6/20/2025 1256  Last data filed at 6/19/2025 2035  Gross per 24 hour   Intake 480 ml   Output --   Net 480 ml       Exam:    BP (!) 162/64   Pulse 64   Temp 97.5 °F (36.4 °C) (Oral)   Resp 16   Ht 1.626 m (5' 4\")   Wt 94.9 kg (209 lb 4.8 oz)   SpO2 96%   BMI 35.93 kg/m²     General appearance: No apparent distress, appears stated age and cooperative.  HEENT: Pupils equal, round, and reactive to light. Conjunctivae/corneas clear.  Neck: Supple, with full range of motion. No jugular venous distention. Trachea midline.  Respiratory:  Normal respiratory effort. Clear to auscultation, bilaterally without Rales/Wheezes/Rhonchi.  Cardiovascular: Regular rate and rhythm with normal S1/S2 without murmurs, rubs or gallops.  Abdomen: Soft, non-tender, non-distended with normal bowel sounds.  Musculoskeletal: L sided hemiparesis   Skin: Skin color, texture, turgor normal.  No rashes or lesions.  Neurologic:  Neurovascularly intact without any focal sensory/motor deficits. Cranial nerves: II-XII intact, grossly non-focal.  Psychiatric: Alert and oriented, thought content appropriate, normal insight  Capillary Refill: Brisk,< 3 seconds   Peripheral 
    Hospitalist Progress Note      PCP: Diana Gutierrez PA    Date of Admission: 6/14/2025    Chief Complaint: weakness    Subjective: patient in chair     Medications:  Reviewed    Infusion Medications    sodium chloride       Scheduled Medications    cloNIDine  0.2 mg Oral TID    sertraline  25 mg Oral Daily    melatonin  5 mg Oral Nightly    Vitamin D  2,000 Units Oral Dinner    cyanocobalamin  1,000 mcg IntraMUSCular Weekly    coenzyme Q10  100 mg Oral Daily    lidocaine  3 patch TransDERmal Daily    hydrALAZINE  25 mg Oral 2 times per day    enoxaparin  40 mg SubCUTAneous Daily    atorvastatin  80 mg Oral Nightly    clopidogrel  75 mg Oral Daily     PRN Meds: bisacodyl, sodium phosphate, sodium chloride, acetaminophen      Intake/Output Summary (Last 24 hours) at 6/22/2025 1042  Last data filed at 6/22/2025 0513  Gross per 24 hour   Intake 1180 ml   Output 250 ml   Net 930 ml       Exam:    BP (!) 185/90   Pulse 80   Temp 97.9 °F (36.6 °C) (Oral)   Resp 18   Ht 1.626 m (5' 4\")   Wt 92.7 kg (204 lb 4.8 oz)   SpO2 94%   BMI 35.07 kg/m²     General appearance: No apparent distress, appears stated age and cooperative.  HEENT: Pupils equal, round, and reactive to light. Conjunctivae/corneas clear.  Neck: Supple, with full range of motion. No jugular venous distention. Trachea midline.  Respiratory:  Normal respiratory effort. Clear to auscultation, bilaterally without Rales/Wheezes/Rhonchi.  Cardiovascular: Regular rate and rhythm with normal S1/S2 without murmurs, rubs or gallops.  Abdomen: Soft, non-tender, non-distended with normal bowel sounds.  Musculoskeletal: L sided hemiparesis   Skin: Skin color, texture, turgor normal.  No rashes or lesions.  Neurologic:  Neurovascularly intact without any focal sensory/motor deficits. Cranial nerves: II-XII intact, grossly non-focal.  Psychiatric: Alert and oriented, thought content appropriate, normal insight  Capillary Refill: Brisk,< 3 seconds   Peripheral 
    Hospitalist Progress Note      PCP: Diana Gutierrez PA    Date of Admission: 6/14/2025    Chief Complaint: weakness    Subjective: patient in chair     Medications:  Reviewed    Infusion Medications    sodium chloride       Scheduled Medications    menthol   Topical BID    hydrocortisone   Rectal BID    hydrocortisone   Topical BID    docusate sodium  100 mg Oral BID    miconazole   Topical BID    Hydrocort-Pramoxine (Perianal)  1 applicator Rectal BID    magnesium oxide  400 mg Oral Daily    hydroCHLOROthiazide  25 mg Oral Daily    pantoprazole  40 mg Oral QAM AC    calcium carbonate  500 mg Oral BID    amLODIPine  10 mg Oral Daily    nystatin  5 mL Oral 4x Daily    cloNIDine  0.2 mg Oral TID    sertraline  25 mg Oral Daily    melatonin  5 mg Oral Nightly    Vitamin D  2,000 Units Oral Dinner    cyanocobalamin  1,000 mcg IntraMUSCular Weekly    coenzyme Q10  100 mg Oral Daily    lidocaine  3 patch TransDERmal Daily    enoxaparin  40 mg SubCUTAneous Daily    atorvastatin  80 mg Oral Nightly    clopidogrel  75 mg Oral Daily     PRN Meds: bisacodyl, HYDROcodone-acetaminophen, zolpidem, HYDROmorphone, butalbital-APAP-caffeine, dextromethorphan-guaiFENesin, bisacodyl, sodium phosphate, sodium chloride, acetaminophen      Intake/Output Summary (Last 24 hours) at 7/14/2025 1150  Last data filed at 7/14/2025 0830  Gross per 24 hour   Intake 240 ml   Output --   Net 240 ml       Exam:    BP (!) 151/79   Pulse 80   Temp 98.4 °F (36.9 °C) (Oral)   Resp 16   Ht 1.626 m (5' 4\")   Wt 91.7 kg (202 lb 3.2 oz)   SpO2 95%   BMI 34.71 kg/m²     General appearance: No apparent distress, appears stated age and cooperative.  HEENT: Pupils equal, round, and reactive to light. Conjunctivae/corneas clear.  Neck: Supple, with full range of motion. No jugular venous distention. Trachea midline.  Respiratory:  Normal respiratory effort. Clear to auscultation, bilaterally without Rales/Wheezes/Rhonchi.  Cardiovascular: Regular 
    Hospitalist Progress Note      PCP: Diana Gutierrez PA    Date of Admission: 6/14/2025    Chief Complaint: weakness    Subjective: patient in chair looks comfortable     Medications:  Reviewed    Infusion Medications    sodium chloride       Scheduled Medications    menthol   Topical BID    hydrocortisone   Rectal BID    hydrocortisone   Topical BID    docusate sodium  100 mg Oral BID    miconazole   Topical BID    Hydrocort-Pramoxine (Perianal)  1 applicator Rectal BID    magnesium oxide  400 mg Oral Daily    hydroCHLOROthiazide  25 mg Oral Daily    pantoprazole  40 mg Oral QAM AC    calcium carbonate  500 mg Oral BID    amLODIPine  10 mg Oral Daily    nystatin  5 mL Oral 4x Daily    cloNIDine  0.2 mg Oral TID    sertraline  25 mg Oral Daily    melatonin  5 mg Oral Nightly    Vitamin D  2,000 Units Oral Dinner    cyanocobalamin  1,000 mcg IntraMUSCular Weekly    coenzyme Q10  100 mg Oral Daily    lidocaine  3 patch TransDERmal Daily    enoxaparin  40 mg SubCUTAneous Daily    atorvastatin  80 mg Oral Nightly    clopidogrel  75 mg Oral Daily     PRN Meds: bisacodyl, HYDROcodone-acetaminophen, zolpidem, HYDROmorphone, butalbital-APAP-caffeine, dextromethorphan-guaiFENesin, bisacodyl, sodium phosphate, sodium chloride, acetaminophen    No intake or output data in the 24 hours ending 07/16/25 1129    Exam:    BP (!) 165/65   Pulse 68   Temp 98.1 °F (36.7 °C) (Oral)   Resp 18   Ht 1.626 m (5' 4\")   Wt 91.7 kg (202 lb 3.2 oz)   SpO2 95%   BMI 34.71 kg/m²     General appearance: No apparent distress, appears stated age and cooperative.  HEENT: Pupils equal, round, and reactive to light. Conjunctivae/corneas clear.  Neck: Supple, with full range of motion. No jugular venous distention. Trachea midline.  Respiratory:  Normal respiratory effort. Clear to auscultation, bilaterally without Rales/Wheezes/Rhonchi.  Cardiovascular: Regular rate and rhythm with normal S1/S2 without murmurs, rubs or gallops.  Abdomen: 
    Hospitalist Progress Note      PCP: Diana Gutierrez PA    Date of Admission: 6/14/2025    Chief Complaint: weakness    Subjective: patient in chair, awake/alert    Medications:  Reviewed    Infusion Medications    sodium chloride       Scheduled Medications    cloNIDine  0.1 mg Oral TID    Vitamin D  2,000 Units Oral Dinner    cyanocobalamin  1,000 mcg IntraMUSCular Weekly    coenzyme Q10  100 mg Oral Daily    lidocaine  3 patch TransDERmal Daily    hydrALAZINE  25 mg Oral 2 times per day    enoxaparin  40 mg SubCUTAneous Daily    atorvastatin  80 mg Oral Nightly    clopidogrel  75 mg Oral Daily     PRN Meds: bisacodyl, sodium phosphate, sodium chloride, acetaminophen      Intake/Output Summary (Last 24 hours) at 6/17/2025 1134  Last data filed at 6/16/2025 1207  Gross per 24 hour   Intake 120 ml   Output --   Net 120 ml       Exam:    BP (!) 190/83   Pulse 78   Temp 98.2 °F (36.8 °C)   Resp 18   Ht 1.626 m (5' 4\")   Wt 94.9 kg (209 lb 4.8 oz)   SpO2 95%   BMI 35.93 kg/m²     General appearance: No apparent distress, appears stated age and cooperative.  HEENT: Pupils equal, round, and reactive to light. Conjunctivae/corneas clear.  Neck: Supple, with full range of motion. No jugular venous distention. Trachea midline.  Respiratory:  Normal respiratory effort. Clear to auscultation, bilaterally without Rales/Wheezes/Rhonchi.  Cardiovascular: Regular rate and rhythm with normal S1/S2 without murmurs, rubs or gallops.  Abdomen: Soft, non-tender, non-distended with normal bowel sounds.  Musculoskeletal: L sided hemiparesis   Skin: Skin color, texture, turgor normal.  No rashes or lesions.  Neurologic:  Neurovascularly intact without any focal sensory/motor deficits. Cranial nerves: II-XII intact, grossly non-focal.  Psychiatric: Alert and oriented, thought content appropriate, normal insight  Capillary Refill: Brisk,< 3 seconds   Peripheral Pulses: +2 palpable, equal bilaterally       Labs:   Recent Labs 
    Hospitalist Progress Note      PCP: Diana Gutierrez PA    Date of Admission: 6/14/2025    Chief Complaint: weakness/constipation    Subjective: patient in chair, awake/alert     Medications:  Reviewed    Infusion Medications    sodium chloride       Scheduled Medications    lactulose  30 g Oral Once    menthol   Topical BID    hydrocortisone   Rectal BID    hydrocortisone   Topical BID    cefUROXime  500 mg Oral 2 times per day    docusate sodium  100 mg Oral BID    miconazole   Topical BID    Hydrocort-Pramoxine (Perianal)  1 applicator Rectal BID    magnesium oxide  400 mg Oral Daily    hydroCHLOROthiazide  25 mg Oral Daily    pantoprazole  40 mg Oral QAM AC    calcium carbonate  500 mg Oral BID    amLODIPine  10 mg Oral Daily    nystatin  5 mL Oral 4x Daily    cloNIDine  0.2 mg Oral TID    sertraline  25 mg Oral Daily    melatonin  5 mg Oral Nightly    Vitamin D  2,000 Units Oral Dinner    cyanocobalamin  1,000 mcg IntraMUSCular Weekly    coenzyme Q10  100 mg Oral Daily    lidocaine  3 patch TransDERmal Daily    enoxaparin  40 mg SubCUTAneous Daily    atorvastatin  80 mg Oral Nightly    clopidogrel  75 mg Oral Daily     PRN Meds: bisacodyl, HYDROcodone-acetaminophen, zolpidem, HYDROmorphone, butalbital-APAP-caffeine, dextromethorphan-guaiFENesin, bisacodyl, sodium phosphate, sodium chloride, acetaminophen    No intake or output data in the 24 hours ending 07/12/25 1159    Exam:    BP (!) 152/93   Pulse 92   Temp 97.5 °F (36.4 °C) (Oral)   Resp 18   Ht 1.626 m (5' 4\")   Wt 92.7 kg (204 lb 4.8 oz)   SpO2 95%   BMI 35.07 kg/m²     General appearance: No apparent distress, appears stated age and cooperative.  HEENT: Pupils equal, round, and reactive to light. Conjunctivae/corneas clear.  Neck: Supple, with full range of motion. No jugular venous distention. Trachea midline.  Respiratory:  Normal respiratory effort. Clear to auscultation, bilaterally without Rales/Wheezes/Rhonchi.  Cardiovascular: 
   Assessment completed. A&O x4. Medicated with PRN Percocet for 8/10 pain to LUE. Lidoderm patches applied to LUE. /80   this morning. Routine BP meds given. In chair alarm activated.  Call light in reach .Electronically signed by RICKY MINER LPN on 7/2/25 at 1:28 PM EDT         
  [facility] Neurology Daily Progress Note  Name: Smooth Easley  Age: 49 y.o.  Gender: female  CodeStatus: Full Code  Allergies: Aspirin    Chief Complaint:No chief complaint on file.    Primary Care Provider: Diana Gutierrez PA  InpatientTreatment Team: Treatment Team:   Sameera Ramirez DO Patel, Dhruv R, MD Saravanan, Balaji, MD Kaplan, Mark, MD Goldberg, Tiago, PhD  Kamryn Sharp, RN  Megan Emery, MSW, LSW  Mauro Walter, OT  Masha Prado OTA  Admission Date: 6/14/2025      HPI   Pt seen and examined on rehab for neurology follow-up.  Alert and oriented x 3, no acute distress, cooperative.  Right gaze preference improved.  Left upper extremity flaccid.  Left lower extremity weakness 2 out of 5.  Taking p.o.  Denies headache.    Vitals:    06/23/25 0752   BP: (!) 156/73   Pulse: 74   Resp: 18   Temp: 98.1 °F (36.7 °C)   SpO2: 94%        Review of Systems   Constitutional:  Negative for appetite change, chills, fatigue and fever.   HENT:  Negative for hearing loss and trouble swallowing.    Respiratory:  Negative for cough, chest tightness, shortness of breath and wheezing.    Cardiovascular:  Negative for chest pain, palpitations and leg swelling.   Gastrointestinal:  Negative for nausea and vomiting.   Musculoskeletal:  Positive for gait problem.   Skin:  Negative for color change and rash.   Neurological:  Positive for weakness. Negative for dizziness, tremors, seizures, syncope, facial asymmetry, speech difficulty, light-headedness, numbness and headaches.   Psychiatric/Behavioral:  Positive for confusion. Negative for agitation and hallucinations. The patient is not nervous/anxious.          Physical Exam  Vitals and nursing note reviewed.   Constitutional:       General: She is not in acute distress.     Appearance: She is not diaphoretic.   HENT:      Head: Normocephalic and atraumatic.   Eyes:      Extraocular Movements: Extraocular movements intact.      Pupils: Pupils are equal, round, and 
  Followup    I was not able to meet with the patient this morning as she was engaged in personal care and then in PT/OT. I reviewed the recent progress notes which seem to indicate that patient is continuing to demonstrate good motivation and effort. The OT note from Thursday, 7/3, in the pm, was positive in terms of support and assistance from her daughter and a friend. However, the patient and her family are likely to be facing significant financial problems (see note from unit  from 7/3).            
  [facility] Neurology Daily Progress Note  Name: Smooth Easley  Age: 49 y.o.  Gender: female  CodeStatus: Full Code  Allergies: Aspirin    Chief Complaint:No chief complaint on file.    Primary Care Provider: Diana Gutierrez PA  InpatientTreatment Team: Treatment Team:   Sameera Ramirez DO Patel, MD Renny Fernandes Balaji, MD Kaplan, Mark, MD Goldberg, Tiago, PhD  Bud, Minoo Gonzales, Otilia TAVAREZ, RCP  Terra Fung, LPN  Yuly, Megan, MSW, LSW  Kip, Amando R, LPN  Eve, Masha L, HANDY  Yi, Simi S, OTR/L  Admission Date: 6/14/2025      HPI   Pt seen and examined on rehab for neurology follow-up.  Alert and oriented x 3, no acute distress, cooperative.  Right gaze preference improved.  Left upper extremity flaccid.  Left lower extremity weakness 2 out of 5.  Taking p.o.  Denies headache.    Vitals:    07/02/25 1505   BP: (!) 152/75   Pulse: 67   Resp: 18   Temp: 97.7 °F (36.5 °C)   SpO2: 96%        Review of Systems   Constitutional:  Negative for appetite change, chills, fatigue and fever.   HENT:  Negative for hearing loss and trouble swallowing.    Respiratory:  Negative for cough, chest tightness, shortness of breath and wheezing.    Cardiovascular:  Negative for chest pain, palpitations and leg swelling.   Gastrointestinal:  Negative for nausea and vomiting.   Musculoskeletal:  Positive for gait problem.   Skin:  Negative for color change and rash.   Neurological:  Positive for weakness. Negative for dizziness, tremors, seizures, syncope, facial asymmetry, speech difficulty, light-headedness, numbness and headaches.   Psychiatric/Behavioral:  Positive for confusion. Negative for agitation and hallucinations. The patient is not nervous/anxious.          Physical Exam  Vitals and nursing note reviewed.   Constitutional:       General: She is not in acute distress.     Appearance: She is not diaphoretic.   HENT:      Head: Normocephalic and atraumatic.   Eyes:      Extraocular 
  [facility] Neurology Daily Progress Note  Name: Smooth Easley  Age: 49 y.o.  Gender: female  CodeStatus: Full Code  Allergies: Aspirin    Chief Complaint:No chief complaint on file.    Primary Care Provider: Diana Gutierrez PA  InpatientTreatment Team: Treatment Team:   Sameera Ramirez DO Patel, MD Renny Fernandes Balaji, MD Kaplan, Mark, MD Goldberg, Tiago, PhD  Daisy Barahona, RN  Megan Emery, MSW, LSW  Clement Meléndez, Amando AGRCIA, LPN  Mauro Walter, PAOLA Fung, Terra, LPN  Eve, Masha TAVAREZ, Regulo Hinkle Andrea Joy, OTA  Admission Date: 6/14/2025      HPI   Pt seen and examined on rehab for neurology follow-up.  Alert and oriented x 3, no acute distress, cooperative.  Right gaze preference improved.  Left upper extremity flaccid.  Left lower extremity weakness 2 out of 5.  Taking p.o.  Denies headache.    Vitals:    06/25/25 0757   BP: (!) 156/69   Pulse: 75   Resp: 17   Temp: 98.4 °F (36.9 °C)   SpO2: 91%        Review of Systems   Constitutional:  Negative for appetite change, chills, fatigue and fever.   HENT:  Negative for hearing loss and trouble swallowing.    Respiratory:  Negative for cough, chest tightness, shortness of breath and wheezing.    Cardiovascular:  Negative for chest pain, palpitations and leg swelling.   Gastrointestinal:  Negative for nausea and vomiting.   Musculoskeletal:  Positive for gait problem.   Skin:  Negative for color change and rash.   Neurological:  Positive for weakness. Negative for dizziness, tremors, seizures, syncope, facial asymmetry, speech difficulty, light-headedness, numbness and headaches.   Psychiatric/Behavioral:  Positive for confusion. Negative for agitation and hallucinations. The patient is not nervous/anxious.          Physical Exam  Vitals and nursing note reviewed.   Constitutional:       General: She is not in acute distress.     Appearance: She is not diaphoretic.   HENT:      Head: Normocephalic and atraumatic. 
  Patient was referred for evaluation/treatment of depression and adjustment to disability. She recently was admitted to Rehab due to a significant stroke. The nurse practitioner noted \"confusion\" yesterday, but this was not evident when I met with her this morning. She was seen on  by the psychiatrist, Dr. Lawson, who reported the following history-     \"The patient is a 49 y.o. 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  She lives with her daughter Leona  Patient reports feeling overwhelmed and depressed rating her mood to be 9 out of 10 with 10 being bad  Denies any hopeless helpless or worthless feeling but feels constantly anxious with occasional panicky feeling  Poor sleep but appetite is good  Poor concentration motivation  Denies audiovisual hallucinations or paranoid thoughts  No manic or psychotic symptoms\"    The Chart Review indicated that she had been diagnosed with depression in 3/23 by a doctor at the ER at [facility], but there were no other references to evaluation or treatment for depression and anxiety.    When I met with her this morning, Smooth was laying in bed in her room. Her affect was very restricted, and only smiled once during my interaction with her. She appeared to be sad, but said that she was okay and doing the best that she could. Her speech was brief, but coherent and relevant. She was cognizant of her neurological deficits, and thought that she was improving, and that the therapy was going well.    I noted that Dr. Lawson had reported that she had a past history of depression and anxiety, but she said that she did not. She said that she had never been treated for depression or anxiety before. She said that her son, age 28, had  in January, and she did 
  [facility] Neurology Daily Progress Note  Name: Smooth Easley  Age: 50 y.o.  Gender: female  CodeStatus: Full Code  Allergies: Aspirin    Chief Complaint:No chief complaint on file.    Primary Care Provider: Diana Gutierrez PA  InpatientTreatment Team: Treatment Team:   Sameera Ramirez DO Patel, MD Brendan Fernandes, Sai, MD Goldberg, Tiago, PhD  Itzel Infante MD Ramos, Minoo Yi, Simi LOPEZ, OTR/L  Shalini Cole, RN  Yuly, Megan, MSW, LSW  Jose Angel, Mauro, OT  Eve, Masha L, HANDY  Admission Date: 6/14/2025      HPI   Pt seen and examined on rehab for neurology follow-up.  Alert and oriented x 3, no acute distress, cooperative.  Right gaze preference improved.  Left upper extremity flaccid.  Left lower extremity weakness 2 out of 5.  Taking p.o.  Denies headache.  Now on regular diet.  Vitals:    07/16/25 1415   BP: (!) 166/75   Pulse:    Resp:    Temp:    SpO2:         Review of Systems   Constitutional:  Negative for appetite change, chills, fatigue and fever.   HENT:  Negative for hearing loss and trouble swallowing.    Respiratory:  Negative for cough, chest tightness, shortness of breath and wheezing.    Cardiovascular:  Negative for chest pain, palpitations and leg swelling.   Gastrointestinal:  Negative for nausea and vomiting.   Musculoskeletal:  Positive for gait problem.   Skin:  Negative for color change and rash.   Neurological:  Positive for weakness. Negative for dizziness, tremors, seizures, syncope, facial asymmetry, speech difficulty, light-headedness, numbness and headaches.   Psychiatric/Behavioral:  Negative for agitation, confusion and hallucinations. The patient is not nervous/anxious.          Physical Exam  Vitals and nursing note reviewed.   Constitutional:       General: She is not in acute distress.     Appearance: She is not diaphoretic.   HENT:      Head: Normocephalic and atraumatic.   Eyes:      Extraocular Movements: Extraocular movements intact.      Pupils: 
  [facility] Neurology Daily Progress Note  Name: Smooth Easley  Age: 49 y.o.  Gender: female  CodeStatus: Full Code  Allergies: Aspirin    Chief Complaint:No chief complaint on file.    Primary Care Provider: Diana Gutierrez PA  InpatientTreatment Team: Treatment Team:   Sameera Ramirez DO Patel, MD Brendan Fernandes Mark, MD Goldberg, Tiago, PhD  Itzel Infante MD Ramos, Carmen Hercules, Terra Sahu, LPLALA Shin, Amando R, LPN  Emery, Megan, MSW, LSW  Jose Angel, Mauro, OT  Yi, Simi S, OTR/L  Sherri Cervantes  Admission Date: 6/14/2025      HPI   Pt seen and examined on rehab for neurology follow-up.  Alert and oriented x 3, no acute distress, cooperative.  Right gaze preference improved.  Left upper extremity flaccid.  Left lower extremity weakness 2 out of 5.  Taking p.o.  Denies headache.  Patient seen and examined events note.  Patient seen on    7/14  Patient seen and examined and events noted.  Patient actually continues to hemiplegia on the left upper and lower extremity but able to stand.  She does have some tone.    Vitals:    07/14/25 1615   BP: (!) 163/85   Pulse: 88   Resp: 18   Temp: 98.6 °F (37 °C)   SpO2:         Review of Systems   Constitutional:  Negative for appetite change, chills, fatigue and fever.   HENT:  Positive for trouble swallowing. Negative for hearing loss.    Respiratory:  Negative for cough, chest tightness, shortness of breath and wheezing.    Cardiovascular:  Negative for chest pain, palpitations and leg swelling.   Gastrointestinal:  Negative for nausea and vomiting.   Musculoskeletal:  Positive for gait problem.   Skin:  Negative for color change and rash.   Neurological:  Positive for weakness. Negative for dizziness, tremors, seizures, syncope, facial asymmetry, speech difficulty, light-headedness, numbness and headaches.   Psychiatric/Behavioral:  Positive for confusion. Negative for agitation and hallucinations. The patient is not 
  [facility] Neurology Daily Progress Note  Name: Smooth Easley  Age: 49 y.o.  Gender: female  CodeStatus: Full Code  Allergies: Aspirin    Chief Complaint:No chief complaint on file.    Primary Care Provider: Diana Gutierrez PA  InpatientTreatment Team: Treatment Team:   Sameera Ramirez DO Patel, Dhruv R, MD Ruiz, Natalia Rogers, APRN - CNP  Deshaun Lawson, Sai Agustin MD Thomas, Taryn, Tatyana Moe, Haley Almonte, Masha Sommer OTA Hermann, Erica, RN Goldberg, Tiago, PhD  Clement Meléndez  Admission Date: 6/14/2025      HPI   Pt seen and examined     Vitals:    06/18/25 0821   BP: (!) 179/77   Pulse: 78   Resp: 18   Temp: 97.7 °F (36.5 °C)   SpO2: 93%        Review of Systems   Constitutional:  Negative for appetite change, chills, fatigue and fever.   HENT:  Positive for trouble swallowing. Negative for hearing loss.    Respiratory:  Negative for cough, chest tightness, shortness of breath and wheezing.    Cardiovascular:  Negative for chest pain, palpitations and leg swelling.   Gastrointestinal:  Negative for nausea and vomiting.   Musculoskeletal:  Positive for gait problem.   Skin:  Negative for color change and rash.   Neurological:  Positive for weakness. Negative for dizziness, tremors, seizures, syncope, facial asymmetry, speech difficulty, light-headedness, numbness and headaches.   Psychiatric/Behavioral:  Positive for confusion. Negative for agitation and hallucinations. The patient is not nervous/anxious.          Physical Exam  Vitals and nursing note reviewed.   Constitutional:       General: She is not in acute distress.     Appearance: She is not diaphoretic.   HENT:      Head: Normocephalic and atraumatic.   Eyes:      Extraocular Movements: Extraocular movements intact.      Pupils: Pupils are equal, round, and reactive to light.   Cardiovascular:      Rate and Rhythm: Normal rate and regular rhythm.   Pulmonary:      Effort: Pulmonary effort is normal. No 
  [facility] Neurology Daily Progress Note  Name: Smooth Easley  Age: 49 y.o.  Gender: female  CodeStatus: Full Code  Allergies: Aspirin    Chief Complaint:No chief complaint on file.    Primary Care Provider: Diana Gutierrez PA  InpatientTreatment Team: Treatment Team:   Scullin, Heather, DO Patel, Dhruv R, MD Saravanan, Balaji, MD Kaplan, Mark, MD Goldberg, Tiago, PhD  Minoo Farrell Patty A, Terra Sahu, ALPESH Shin, Amando R, LPN  Mauro Walter, OT  Laney, Haley MCCRARY, MARQUIS Yi, Simi LOPEZ, OTR/L  Masha Prado, HANDY  Admission Date: 6/14/2025      HPI   Pt seen and examined on rehab for neurology follow-up.  Alert and oriented x 3, no acute distress, cooperative.  Right gaze preference improved.  Left upper extremity flaccid.  Left lower extremity weakness 2 out of 5.  Taking p.o.  Denies headache.    Vitals:    06/30/25 0736   BP: (!) 161/74   Pulse: 67   Resp: 18   Temp: 98.2 °F (36.8 °C)   SpO2: 95%        Review of Systems   Constitutional:  Negative for appetite change, chills, fatigue and fever.   HENT:  Negative for hearing loss and trouble swallowing.    Respiratory:  Negative for cough, chest tightness, shortness of breath and wheezing.    Cardiovascular:  Negative for chest pain, palpitations and leg swelling.   Gastrointestinal:  Negative for nausea and vomiting.   Musculoskeletal:  Positive for gait problem.   Skin:  Negative for color change and rash.   Neurological:  Positive for weakness. Negative for dizziness, tremors, seizures, syncope, facial asymmetry, speech difficulty, light-headedness, numbness and headaches.   Psychiatric/Behavioral:  Positive for confusion. Negative for agitation and hallucinations. The patient is not nervous/anxious.          Physical Exam  Vitals and nursing note reviewed.   Constitutional:       General: She is not in acute distress.     Appearance: She is not diaphoretic.   HENT:      Head: Normocephalic and atraumatic.   Eyes:      
 [facility] OCCUPATIONAL THERAPY DISCHARGE SUMMARY- REHAB     Date: 2025  Patient Name: Smooth Easley        MRN: [id number]  Account: [id number]   : 1975  (50 y.o.)  Room: [place] [address]    Diagnosis:  Impaired mobility and ADL's due to R MCA infarct    Past Medical History:   Diagnosis Date    Lumbosacral disc disease     Osteoarthritis      Past Surgical History:   Procedure Laterality Date    CHOLECYSTECTOMY         Precautions:   Restrictions/Precautions: Fall Risk     Social/Functional History:  Social/Functional History  Lives With: Daughter (Leona (online college and does not work); has a [place] alvarez (on a leash) and four cats (dtr cares for))  Type of Home: House  Home Layout:  (living room, kitchen, and half bath on main floor, 4 steps between levels- split level- pts level is downstairs; shower on top floor, bathtub on bottom floor)  Home Access: Stairs to enter with rails  Entrance Stairs - Number of Steps: 4-5?- \"I would have to ask my dtr how many steps there are\"  Entrance Stairs - Rails: Both (can reach both at the same time)  Bathroom Shower/Tub: Tub only (jet tub with handheld shower)  Bathroom Toilet: Standard  Bathroom Equipment: Hand-held shower  Bathroom Accessibility: Walker accessible  Home Equipment: None  Has the patient had two or more falls in the past year or any fall with injury in the past year?: No  Prior Level of Assist for ADLs: Independent  Prior Level of Assist for Homemaking: Independent  Meal Prep Responsibility: Primary  Laundry Responsibility: Primary (shares w/ dtr)  Cleaning Responsibility:  (shares w/ dtr)  Bill Paying/Finance Responsibility: Primary (auto pay)  Shopping Responsibility: Primary  Dependent Care Responsibility: Primary  Health Care Management:  (was not on any medications prior to hospitalization)  Prior Level of Assist for Ambulation: Independent household ambulator, with or without device, Independent community ambulator, with or without 
49 y.o. female patient who presented to [facility] ER 6/10 with initial c/o chest tightness/ pressure and tingling to right side. Noted to be hypertensive. EKG showed ectopic atrial rhythm with HR 87,  no acute STEMI. Trops obtained and noted to be improved with each draw. While in ER, patient was noted to have left sided weakness, facial droop and confusion.Initial CT of head without acute findings. Additional work up confirmed   Lg non-hemorrhagic acute R MCA infarct in addition to adrenal and liver massess. Neurology, heme/onc and IR consulted.  Kimberly Couch, RN  Registered Nurse     Plan of Care     Signed     Date of Service: 6/21/2025 12:13 AM     Signed            Problem: Discharge Planning  Goal: Discharge to home or other facility with appropriate resources  Outcome: Progressing     Problem: Pain  Goal: Verbalizes/displays adequate comfort level or baseline comfort level  Outcome: Progressing     Problem: Safety - Adult  Goal: Free from fall injury  Outcome: Progressing                    Subjective:   The patient complains of severe acute on chronic progressive fatigue and right sided weakness, aphasia, dysphagia partially relieved by rest, medications, PT,  OT,   SLP and rest and exacerbated by recent CVA.      I am concerned about patient’s medical complexities and barriers to advancing in rehab goals including decreasing stroke risk and reassessing and advancing her diet.        I discussed current functional, rehabilitation, medical status with other rehabilitation providers including nursing and case management.  According to recent nursing note, \" Up on her wheel chair. Denies pain or discomfort. Visitor in room. Pt. Consumed 100% of lunch.   Pt. Telemetry was discontinued. Last report 90 SR\".         ROS x10:  The patient also complains of severely impaired mobility and activities of daily living.  Otherwise no new problems with vision, hearing, nose, mouth, throat, dermal, cardiovascular, GI, , 
49 y.o. female patient who presented to [facility] ER 6/10 with initial c/o chest tightness/ pressure and tingling to right side. Noted to be hypertensive. EKG showed ectopic atrial rhythm with HR 87,  no acute STEMI. Trops obtained and noted to be improved with each draw. While in ER, patient was noted to have left sided weakness, facial droop and confusion.Initial CT of head without acute findings. Additional work up confirmed Lg non-hemorrhagic acute R MCA infarct in addition to adrenal and liver massess. Neurology, heme/onc and IR consulted.    Subjective:   The patient complains of severe acute on chronic progressive fatigue and right sided weakness, aphasia, dysphagia partially relieved by rest, medications, PT,  OT,   SLP and rest and exacerbated by recent CVA.      I am concerned about patient’s medical complexities and barriers to advancing in rehab goals including decreasing stroke risk and reassessing and advancing her diet.        I discussed current functional, rehabilitation, medical status with other rehabilitation providers including nursing and case management.  According to recent nursing note,  \"resting in bed with some complaints of left arm pain rated 2/10. Patient medicated with Tylenol 650 MG PO PRN for left arm pain with the rest of her evening medications. Patient took her medications whole at once with sips of water without difficulty. Patient had a large BMx2 on the toilet prior to shift change. Day shift RN Jimmy states patient was assisted on and off the toilet with a tremaine steady and two assist. Patient requested a purewick in place with a depends for incontinent episodes. Patient declined an HS snack. Patient requested the IV be removed from her right hand. IV removed and catheter is intact. Placed a band aid to patients right hand. Patient repositioned in bed with several pillows in place. Patient states she is comfortable and verbalized no further needs at this time. Patients bed alarm 
49 y.o. female patient who presented to [facility] ER 6/10 with initial c/o chest tightness/ pressure and tingling to right side. Noted to be hypertensive. EKG showed ectopic atrial rhythm with HR 87,  no acute STEMI. Trops obtained and noted to be improved with each draw. While in ER, patient was noted to have left sided weakness, facial droop and confusion.Initial CT of head without acute findings. Additional work up confirmed Lg non-hemorrhagic acute R MCA infarct in addition to adrenal and liver massess. Neurology, heme/onc and IR consulted.    Subjective:   The patient complains of severe acute on chronic progressive fatigue and right sided weakness, aphasia, dysphagia partially relieved by rest, medications, PT,  OT,   SLP and rest and exacerbated by recent CVA.      I am concerned about patient’s medical complexities and barriers to advancing in rehab goals including decreasing stroke risk and reassessing and advancing her diet.        I discussed current functional, rehabilitation, medical status with other rehabilitation providers including nursing and case management.  According to recent nursing note,   no recent substantive nursing notes.  Care discussed directly with nursing and care team.      ROS x10:  The patient also complains of severely impaired mobility and activities of daily living.  Otherwise no new problems with vision, hearing, nose, mouth, throat, dermal, cardiovascular, GI, , pulmonary, musculoskeletal, psychiatric or neurological. See Rehab H&P on Rehab chart dated .       Vital signs:  BP (!) 184/72   Pulse 71   Temp 98.2 °F (36.8 °C) (Oral)   Resp 17   Ht 1.626 m (5' 4\")   Wt 92.7 kg (204 lb 4.8 oz)   SpO2 100%   BMI 35.07 kg/m²   I/O:   PO/Intake:  fair PO intake, no problems observed or reported-upgraded to soft with bite-size no straws thin liquid    Bowel/Bladder:  continent bowel= incontinent of bladder, malodorous urine  General:  Patient is well developed, adequately 
49 y.o. female patient who presented to [facility] ER 6/10 with initial c/o chest tightness/ pressure and tingling to right side. Noted to be hypertensive. EKG showed ectopic atrial rhythm with HR 87,  no acute STEMI. Trops obtained and noted to be improved with each draw. While in ER, patient was noted to have left sided weakness, facial droop and confusion.Initial CT of head without acute findings. Additional work up confirmed Lg non-hemorrhagic acute R MCA infarct in addition to adrenal and liver massess. Neurology, heme/onc and IR consulted.    Subjective:   The patient complains of severe acute on chronic progressive fatigue and right sided weakness, aphasia, dysphagia partially relieved by rest, medications, PT,  OT,   SLP and rest and exacerbated by recent CVA.      I am concerned about patient’s medical complexities and barriers to advancing in rehab goals including decreasing stroke risk and reassessing and advancing her diet.        I discussed current functional, rehabilitation, medical status with other rehabilitation providers including nursing and case management.  According to recent nursing note,  \"resting in bed with some complaints of left arm pain rated 2/10. Patient medicated with Tylenol 650 MG PO PRN for left arm pain with the rest of her evening medications. Patient took her medications whole at once with sips of water without difficulty. Patient had a large BMx2 on the toilet prior to shift change. Day shift RN Jimmy states patient was assisted on and off the toilet with a tremaine steady and two assist. Patient requested a purewick in place with a depends for incontinent episodes. Patient declined an HS snack. Patient requested the IV be removed from her right hand. IV removed and catheter is intact. Placed a band aid to patients right hand. Patient repositioned in bed with several pillows in place. Patient states she is comfortable and verbalized no further needs at this time. Patients bed alarm 
49 y.o. female patient who presented to [facility] ER 6/10 with initial c/o chest tightness/ pressure and tingling to right side. Noted to be hypertensive. EKG showed ectopic atrial rhythm with HR 87,  no acute STEMI. Trops obtained and noted to be improved with each draw. While in ER, patient was noted to have left sided weakness, facial droop and confusion.Initial CT of head without acute findings. Additional work up confirmed Lg non-hemorrhagic acute R MCA infarct in addition to adrenal and liver massess. Neurology, heme/onc and IR consulted.    Subjective:   The patient complains of severe acute on chronic progressive fatigue and right sided weakness, aphasia, dysphagia partially relieved by rest, medications, PT,  OT,   SLP and rest and exacerbated by recent CVA.      I continue to be concerned about patient’s medical complexities and barriers to advancing in rehab goals including decreasing stroke risk and reassessing and advancing her diet.        I discussed current functional, rehabilitation, medical status with other rehabilitation providers including nursing and case management.  According to recent nursing note,  \"report received, assumed care of the pt. Pt asleep in bed. No S&S of distress noted. Bed alarm on, safety maintained.\"     Her left upper extremity pain is improving with the higher dose of the Norco at 10 mg no signs of overuse abuse constipation or sedation.    ROS x10:  The patient also complains of severely impaired mobility and activities of daily living.  Otherwise no new problems with vision, hearing, nose, mouth, throat, dermal, cardiovascular, GI, , pulmonary, musculoskeletal, psychiatric or neurological. See Rehab H&P on Rehab chart dated .       Vital signs:  BP (!) 175/90   Pulse 69   Temp 97.5 °F (36.4 °C) (Oral)   Resp 16   Ht 1.626 m (5' 4\")   Wt 92.7 kg (204 lb 4.8 oz)   SpO2 93%   BMI 35.07 kg/m²   I/O:   PO/Intake:  fair PO intake, no problems observed or 
49 y.o. female patient who presented to [facility] ER 6/10 with initial c/o chest tightness/ pressure and tingling to right side. Noted to be hypertensive. EKG showed ectopic atrial rhythm with HR 87,  no acute STEMI. Trops obtained and noted to be improved with each draw. While in ER, patient was noted to have left sided weakness, facial droop and confusion.Initial CT of head without acute findings. Additional work up confirmed Lg non-hemorrhagic acute R MCA infarct in addition to adrenal and liver massess. Neurology, heme/onc and IR consulted.    Subjective:   The patient complains of severe acute on chronic progressive fatigue and right sided weakness, aphasia, dysphagia partially relieved by rest, medications, PT,  OT,   SLP and rest and exacerbated by recent CVA.      I am concerned about patient’s medical complexities and barriers to advancing in rehab goals including decreasing stroke risk and reassessing and advancing her diet.        I discussed current functional, rehabilitation, medical status with other rehabilitation providers including nursing and case management.  According to recent nursing note,  \"Medicated with PRN Percocet for 5/10 pain to LUE. LUE elevated on a pillow. Pur wick in place. In bed with alarm activated. Call light in reach. \"     Her shoulder hand syndrome pain with thalamic pain syndrome is relieved only with the Percocet which is wearing off after 2 to 3 hours.  We discussed transitioning to longer acting oxycodone products with as needed Percocet she is in agreement.  She has no history of overuse abuse sedation or ill effects of opiate medications.  She will take them at lowest effective dose and we will monitor her for side effects or sedation.      ROS x10:  The patient also complains of severely impaired mobility and activities of daily living.  Otherwise no new problems with vision, hearing, nose, mouth, throat, dermal, cardiovascular, GI, , pulmonary, musculoskeletal, 
49 y.o. female patient who presented to [facility] ER 6/10 with initial c/o chest tightness/ pressure and tingling to right side. Noted to be hypertensive. EKG showed ectopic atrial rhythm with HR 87,  no acute STEMI. Trops obtained and noted to be improved with each draw. While in ER, patient was noted to have left sided weakness, facial droop and confusion.Initial CT of head without acute findings. Additional work up confirmed   Lg non-hemorrhagic acute R MCA infarct in addition to adrenal and liver massess. Neurology, heme/onc and IR consulted.    Subjective:   The patient complains of severe acute on chronic progressive fatigue and right sided weakness, aphasia, dysphagia partially relieved by rest, medications, PT,  OT,   SLP and rest and exacerbated by recent CVA.      I am concerned about patient’s medical complexities and barriers to advancing in rehab goals including decreasing stroke risk and reassessing and advancing her diet.        I discussed current functional, rehabilitation, medical status with other rehabilitation providers including nursing and case management.  According to recent nursing note, \"met with pt and dtr Leona, and discussed discharge date and goals. Both are aware that pt is not goaled for independence at this time, but are hopeful she will surpass goals. LSW explained that depending on pt's progress, her goals may be upgraded and d/c date can change, pt and fam verbalized understanding. Family training will be scheduled closer to d/c and dtr is agreeable. Pt remains motivated and hopeful. FMLA paperwork was also discussed and it is being sent over for rehab to complete \".    Psychiatrist note appreciated.  She needs a lot of emotional support right now because of the stroke as well as her recent grief from her son's passing.  She is on Zoloft for depression.    Discussed with OT will make sure for propping her left arm on the lap tray when she is in the wheelchair    ROS x10:  The 
49 y.o. female patient who presented to [facility] ER 6/10 with initial c/o chest tightness/ pressure and tingling to right side. Noted to be hypertensive. EKG showed ectopic atrial rhythm with HR 87,  no acute STEMI. Trops obtained and noted to be improved with each draw. While in ER, patient was noted to have left sided weakness, facial droop and confusion.Initial CT of head without acute findings. Additional work up confirmed Lg non-hemorrhagic acute R MCA infarct in addition to adrenal and liver massess. Neurology, heme/onc and IR consulted.    Subjective:   The patient complains of severe acute on chronic progressive fatigue and right sided weakness, aphasia, dysphagia partially relieved by rest, medications, PT,  OT,   SLP and rest and exacerbated by recent CVA.      I am concerned about patient’s medical complexities and barriers to advancing in rehab goals including decreasing stroke risk and reassessing and advancing her diet.        I discussed current functional, rehabilitation, medical status with other rehabilitation providers including nursing and case management.  According to recent nursing note, \"VS and assessment completed. Pt A&O x4, calm and cooperative. Pt with L sided weakness, minimal facial drooping. Pt tolerating diet, meds given per MAR. Pt with c/o pain or SOB. Bed locked and in low position. Call light and tray table within reach. \".    She continues to complain of headache we discussed using Fioricet.  She continues to have severe left hemiplegia and uses a Mallorie steady for transfers.    ROS x10:  The patient also complains of severely impaired mobility and activities of daily living.  Otherwise no new problems with vision, hearing, nose, mouth, throat, dermal, cardiovascular, GI, , pulmonary, musculoskeletal, psychiatric or neurological. See Rehab H&P on Rehab chart dated .       Vital signs:  BP (!) 156/73   Pulse 74   Temp 98.1 °F (36.7 °C)   Resp 18   Ht 1.626 m (5' 4\")   Wt 
49 y.o. female patient who presented to [facility] ER 6/10 with initial c/o chest tightness/ pressure and tingling to right side. Noted to be hypertensive. EKG showed ectopic atrial rhythm with HR 87,  no acute STEMI. Trops obtained and noted to be improved with each draw. While in ER, patient was noted to have left sided weakness, facial droop and confusion.Initial CT of head without acute findings. Additional work up confirmed Lg non-hemorrhagic acute R MCA infarct in addition to adrenal and liver massess. Neurology, heme/onc and IR consulted.    Subjective:   The patient complains of severe acute on chronic progressive fatigue and right sided weakness, aphasia, dysphagia partially relieved by rest, medications, PT,  OT,   SLP and rest and exacerbated by recent CVA.      I am concerned about patient’s medical complexities and barriers to advancing in rehab goals including decreasing stroke risk and reassessing and advancing her diet.        I discussed current functional, rehabilitation, medical status with other rehabilitation providers including nursing and case management.  According to recent nursing note,  \"sitting up in the wheelchair and requesting to get ready for bed. Patient complains of left arm pain rated 7/10. Patient medicated with Percocet 7.5-325 MG PO PRN for pain with the rest of her evening medications. Patient took her medications whole at once with sips of water without difficulty. Patient declined an HS snack. Patient was transferred from her wheelchair to the bed with the tremaine-steady and two assist. Patient requested to wear the purewick at HS with a depends in case of incontinent episodes. Patient repositioned in bed with several pillows in place for comfort. Patients bed alarm is engaged and call light is within reach \"    Shoulder hand syndrome improving somewhat with therapy and titration of pain medication.  She now complains of GERD she of course cannot take NSAIDs because of her 
49 y.o. female patient who presented to [facility] ER 6/10 with initial c/o chest tightness/ pressure and tingling to right side. Noted to be hypertensive. EKG showed ectopic atrial rhythm with HR 87,  no acute STEMI. Trops obtained and noted to be improved with each draw. While in ER, patient was noted to have left sided weakness, facial droop and confusion.Initial CT of head without acute findings. Additional work up confirmed   Lg non-hemorrhagic acute R MCA infarct in addition to adrenal and liver massess. Neurology, heme/onc and IR consulted.    Subjective:   The patient complains of severe acute on chronic progressive fatigue and right sided weakness, aphasia, dysphagia partially relieved by rest, medications, PT,  OT,   SLP and rest and exacerbated by recent CVA.      I am concerned about patient’s medical complexities and barriers to advancing in rehab goals including decreasing stroke risk and reassessing and advancing her diet.        I discussed current functional, rehabilitation, medical status with other rehabilitation providers including nursing and case management.  According to recent nursing note, \"A witnessed ease down to the floor, PCA with patient in effort for patient to use the RR. Patient and PCA denies patient hitting her head and that patient did fall she eased her to the ground. Patient VS assessed and a visual assessment complete. No S/S of distress noted at this time. With staff assistance patient was lifted back into the wheelchair and then to bed. \"...\"Pt alert and oriented x4. Pt denied being in any pain. Pt's daughter reported to this nurse that mother had fallen prior to this nurses shift and hit their head contradicting what the mother reported. This nurse assessed the pt for any more wounds or trauma but saw none. Pts head was physically examined for any bleeding, bruising, or bumps. Pt repeated to this nurse that they were in 0/10 pain. Pt given medication per MAR. Pt denied 
49 y.o. female patient who presented to [facility] ER 6/10 with initial c/o chest tightness/ pressure and tingling to right side. Noted to be hypertensive. EKG showed ectopic atrial rhythm with HR 87,  no acute STEMI. Trops obtained and noted to be improved with each draw. While in ER, patient was noted to have left sided weakness, facial droop and confusion.Initial CT of head without acute findings. Additional work up confirmed Lg non-hemorrhagic acute R MCA infarct in addition to adrenal and liver massess. Neurology, heme/onc and IR consulted.    Subjective:   The patient complains of severe acute on chronic progressive fatigue and right sided weakness, aphasia, dysphagia partially relieved by rest, medications, PT,  OT,   SLP and rest and exacerbated by recent CVA.      I am concerned about patient’s medical complexities and barriers to advancing in rehab goals including decreasing stroke risk and reassessing and advancing her diet.        I discussed current functional, rehabilitation, medical status with other rehabilitation providers including nursing and case management.  According to recent nursing note,   \"complains of back pain rated 7/10. Repositioned patient in bed. Patient medicated with Oxycodone 5 MG PO PRN per request for back pain. Patient verbalized no further needs at this time. Bed alarm is engaged and call light is within reach \".    ROS x10:  The patient also complains of severely impaired mobility and activities of daily living.  Otherwise no new problems with vision, hearing, nose, mouth, throat, dermal, cardiovascular, GI, , pulmonary, musculoskeletal, psychiatric or neurological. See Rehab H&P on Rehab chart dated .       Vital signs:  BP (!) 147/81   Pulse 75   Temp 98.6 °F (37 °C) (Oral)   Resp 18   Ht 1.626 m (5' 4\")   Wt 92.7 kg (204 lb 4.8 oz)   SpO2 93%   BMI 35.07 kg/m²   I/O:   PO/Intake:  fair PO intake, no problems observed or reported-upgraded to soft with bite-size no 
49 y.o. female patient who presented to [facility] ER 6/10 with initial c/o chest tightness/ pressure and tingling to right side. Noted to be hypertensive. EKG showed ectopic atrial rhythm with HR 87,  no acute STEMI. Trops obtained and noted to be improved with each draw. While in ER, patient was noted to have left sided weakness, facial droop and confusion.Initial CT of head without acute findings. Additional work up confirmed   Lg non-hemorrhagic acute R MCA infarct in addition to adrenal and liver massess. Neurology, heme/onc and IR consulted.    Subjective:   The patient complains of severe acute on chronic progressive fatigue and right sided weakness, aphasia, dysphagia partially relieved by rest, medications, PT,  OT,   SLP and rest and exacerbated by recent CVA.      I am concerned about patient’s medical complexities and barriers to advancing in rehab goals including decreasing stroke risk and reassessing and advancing her diet.        I discussed current functional, rehabilitation, medical status with other rehabilitation providers including nursing and case management.  According to recent nursing note, \"  new admission- is currently being evaluated, but is excited to be here and ready to feel better \".    ROS x10:  The patient also complains of severely impaired mobility and activities of daily living.  Otherwise no new problems with vision, hearing, nose, mouth, throat, dermal, cardiovascular, GI, , pulmonary, musculoskeletal, psychiatric or neurological. See Rehab H&P on Rehab chart dated .       Vital signs:  BP (!) 165/73   Pulse 74   Temp 97.9 °F (36.6 °C) (Oral)   Resp 18   Ht 1.626 m (5' 4\")   Wt 94.9 kg (209 lb 4.8 oz)   SpO2 98%   BMI 35.93 kg/m²   I/O:   PO/Intake:  fair PO intake, no problems observed or reported-purées with mildly nectar thick.    Bowel/Bladder:  continent, constipation and urinary urgency.  General:  Patient is well developed, adequately nourished, non-obese 
49 y.o. female patient who presented to [facility] ER 6/10 with initial c/o chest tightness/ pressure and tingling to right side. Noted to be hypertensive. EKG showed ectopic atrial rhythm with HR 87,  no acute STEMI. Trops obtained and noted to be improved with each draw. While in ER, patient was noted to have left sided weakness, facial droop and confusion.Initial CT of head without acute findings. Additional work up confirmed Lg non-hemorrhagic acute R MCA infarct in addition to adrenal and liver massess. Neurology, heme/onc and IR consulted.    Subjective:   The patient complains of severe acute on chronic progressive fatigue and right sided weakness, aphasia, dysphagia partially relieved by rest, medications, PT,  OT,   SLP and rest and exacerbated by recent CVA.      I continue to be concerned about patient’s medical complexities and barriers to advancing in rehab goals including decreasing stroke risk and reassessing and advancing her diet.        I discussed current functional, rehabilitation, medical status with other rehabilitation providers including nursing and case management.  According to recent nursing note,  \"blood pressure has improved to 158/75. Patient complains of left arm pain rated 6/10. Patient repositioned in bed with several pillows in place for comfort. Patient medicated with Norco 7.5-325 MG PO PRN for pain per patient request. Patient verbalized no further needs at this time. Patients bed alarm is engaged and call light is within reach. \"     Patient's UA has white blood cells and few bacteria culture is being sent.  This is possibly a yeast we will start antibiotics pending culture.    She complains of thalamic pain unrelieved with her current dose of medications she is not able to sleep because of the pain I will titrate her Norco to lowest effective dose currently on 7.5 we will go to 10 mg.  Also she will have something to help her sleep.  5 mg of Ambien as needed and and monitor 
49 y.o. female patient who presented to [facility] ER 6/10 with initial c/o chest tightness/ pressure and tingling to right side. Noted to be hypertensive. EKG showed ectopic atrial rhythm with HR 87,  no acute STEMI. Trops obtained and noted to be improved with each draw. While in ER, patient was noted to have left sided weakness, facial droop and confusion.Initial CT of head without acute findings. Additional work up confirmed Lg non-hemorrhagic acute R MCA infarct in addition to adrenal and liver massess. Neurology, heme/onc and IR consulted.    Subjective:   The patient complains of severe acute on chronic progressive fatigue and right sided weakness, aphasia, dysphagia partially relieved by rest, medications, PT,  OT,   SLP and rest and exacerbated by recent CVA.      I am concerned about patient’s medical complexities and barriers to advancing in rehab goals including decreasing stroke risk and reassessing and advancing her diet.        I discussed current functional, rehabilitation, medical status with other rehabilitation providers including nursing and case management.  According to recent nursing note,  \"had a small emesis this AM that consisted of yellow bile. Patients gown and sheet changed per Janette PCA. \"     She had significant nausea and emesis this morning we will start IV fluids IV as needed Zofran and work on paralytic ileus after checking KUB.    ROS x10:  The patient also complains of severely impaired mobility and activities of daily living.  Otherwise no new problems with vision, hearing, nose, mouth, throat, dermal, cardiovascular, GI, , pulmonary, musculoskeletal, psychiatric or neurological. See Rehab H&P on Rehab chart dated .       Vital signs:  BP (!) 197/83   Pulse 84   Temp 97.7 °F (36.5 °C) (Oral)   Resp 18   Ht 1.626 m (5' 4\")   Wt 92.7 kg (204 lb 4.8 oz)   SpO2 94%   BMI 35.07 kg/m²   I/O:   PO/Intake:  fair PO intake, no problems observed or reported-upgraded to soft with 
49 y.o. female patient who presented to [facility] ER 6/10 with initial c/o chest tightness/ pressure and tingling to right side. Noted to be hypertensive. EKG showed ectopic atrial rhythm with HR 87,  no acute STEMI. Trops obtained and noted to be improved with each draw. While in ER, patient was noted to have left sided weakness, facial droop and confusion.Initial CT of head without acute findings. Additional work up confirmed Lg non-hemorrhagic acute R MCA infarct in addition to adrenal and liver massess. Neurology, heme/onc and IR consulted.    Subjective:   The patient complains of severe acute on chronic progressive fatigue and right sided weakness, aphasia, dysphagia partially relieved by rest, medications, PT,  OT,   SLP and rest and exacerbated by recent CVA.      I am concerned about patient’s medical complexities and barriers to advancing in rehab goals including decreasing stroke risk and reassessing and advancing her diet.        I discussed current functional, rehabilitation, medical status with other rehabilitation providers including nursing and case management.  According to recent nursing note,  no recent substantive nursing notes.  Care discussed directly with nursing and care team.       I am concerned about her elevated a.m. blood pressures I will scatter her blood pressure medications.    ROS x10:  The patient also complains of severely impaired mobility and activities of daily living.  Otherwise no new problems with vision, hearing, nose, mouth, throat, dermal, cardiovascular, GI, , pulmonary, musculoskeletal, psychiatric or neurological. See Rehab H&P on Rehab chart dated .       Vital signs:  BP (!) 174/65   Pulse 78   Temp 98.6 °F (37 °C) (Oral)   Resp 16   Ht 1.626 m (5' 4\")   Wt 92.7 kg (204 lb 4.8 oz)   SpO2 94%   BMI 35.07 kg/m²   I/O:   PO/Intake:  fair PO intake, no problems observed or reported-upgraded to soft with bite-size no straws thin liquid    Bowel/Bladder:  
49 y.o. female patient who presented to [facility] ER 6/10 with initial c/o chest tightness/ pressure and tingling to right side. Noted to be hypertensive. EKG showed ectopic atrial rhythm with HR 87,  no acute STEMI. Trops obtained and noted to be improved with each draw. While in ER, patient was noted to have left sided weakness, facial droop and confusion.Initial CT of head without acute findings. Additional work up confirmed Lg non-hemorrhagic acute R MCA infarct in addition to adrenal and liver massess. Neurology, heme/onc and IR consulted.    Subjective:   The patient complains of severe acute on chronic progressive fatigue and right sided weakness, aphasia, dysphagia partially relieved by rest, medications, PT,  OT,   SLP and rest and exacerbated by recent CVA.      I am concerned about patient’s medical complexities and barriers to advancing in rehab goals including decreasing stroke risk and reassessing and advancing her diet.        I discussed current functional, rehabilitation, medical status with other rehabilitation providers including nursing and case management.  According to recent nursing note,  \"care of patient from Anuradha BETTS. Patient is resting in bed with her eyes closed. Patients bed alarm is engaged and call light is within reach. \"     I reviewed and appreciate surgical note and agree with treatment plan continue clear liquids for now follow-up after CT of abdomen.  Passing miles today--no BMs    ROS x10:  The patient also complains of severely impaired mobility and activities of daily living.  Otherwise no new problems with vision, hearing, nose, mouth, throat, dermal, cardiovascular, GI, , pulmonary, musculoskeletal, psychiatric or neurological. See Rehab H&P on Rehab chart dated .       Vital signs:  BP (!) 182/85   Pulse 75   Temp 97.9 °F (36.6 °C) (Oral)   Resp 17   Ht 1.626 m (5' 4\")   Wt 92.7 kg (204 lb 4.8 oz)   SpO2 92%   BMI 35.07 kg/m²   I/O:   PO/Intake:  fair PO intake, 
49 y.o. female patient who presented to [facility] ER 6/10 with initial c/o chest tightness/ pressure and tingling to right side. Noted to be hypertensive. EKG showed ectopic atrial rhythm with HR 87,  no acute STEMI. Trops obtained and noted to be improved with each draw. While in ER, patient was noted to have left sided weakness, facial droop and confusion.Initial CT of head without acute findings. Additional work up confirmed Lg non-hemorrhagic acute R MCA infarct in addition to adrenal and liver massess. Neurology, heme/onc and IR consulted.    Subjective:   The patient complains of severe acute on chronic progressive fatigue and right sided weakness, aphasia, dysphagia partially relieved by rest, medications, PT,  OT,   SLP and rest and exacerbated by recent CVA.      I am concerned about patient’s medical complexities and barriers to advancing in rehab goals including decreasing stroke risk and reassessing and advancing her diet.        I discussed current functional, rehabilitation, medical status with other rehabilitation providers including nursing and case management.  According to recent nursing note,  \"Patient's bp has been running high today. Hospitalist notified. Clonidine given twice.   Lactolose given this morning because patient hasn't had a BM for 5 days.  Percocet given this morning for 8/10 pain to left arm.  Amlodipine ordered and given.  BP at this time is 175/78, 68. Scheduled hydralazine given early. Report given to night RN to recheck.\"    CO left UE CVA rel pain.--Prop elbow and dose medications.      ROS x10:  The patient also complains of severely impaired mobility and activities of daily living.  Otherwise no new problems with vision, hearing, nose, mouth, throat, dermal, cardiovascular, GI, , pulmonary, musculoskeletal, psychiatric or neurological. See Rehab H&P on Rehab chart dated .       Vital signs:  BP (!) 161/74   Pulse 67   Temp 98.2 °F (36.8 °C) (Oral)   Resp 18   Ht 
49 y.o. female patient who presented to [facility] ER 6/10 with initial c/o chest tightness/ pressure and tingling to right side. Noted to be hypertensive. EKG showed ectopic atrial rhythm with HR 87,  no acute STEMI. Trops obtained and noted to be improved with each draw. While in ER, patient was noted to have left sided weakness, facial droop and confusion.Initial CT of head without acute findings. Additional work up confirmed Lg non-hemorrhagic acute R MCA infarct in addition to adrenal and liver massess. Neurology, heme/onc and IR consulted.    Subjective:   The patient complains of severe acute on chronic progressive fatigue and right sided weakness, aphasia, dysphagia partially relieved by rest, medications, PT,  OT,   SLP and rest and exacerbated by recent CVA.      I am concerned about patient’s medical complexities and barriers to advancing in rehab goals including decreasing stroke risk and reassessing and advancing her diet.        I discussed current functional, rehabilitation, medical status with other rehabilitation providers including nursing and case management.  According to recent nursing note, no recent substantive nursing notes.  Care discussed directly with nursing and care team.    Her GERD symptoms are better with PPI we will cycle off Carafate.    ROS x10:  The patient also complains of severely impaired mobility and activities of daily living.  Otherwise no new problems with vision, hearing, nose, mouth, throat, dermal, cardiovascular, GI, , pulmonary, musculoskeletal, psychiatric or neurological. See Rehab H&P on Rehab chart dated .       Vital signs:  BP (!) 169/75   Pulse 69   Temp 97.5 °F (36.4 °C) (Oral)   Resp 17   Ht 1.626 m (5' 4\")   Wt 92.7 kg (204 lb 4.8 oz)   SpO2 99%   BMI 35.07 kg/m²   I/O:   PO/Intake:  fair PO intake, no problems observed or reported-upgraded to soft with bite-size no straws thin liquid    Bowel/Bladder: Constipated continent bowel= incontinent of 
49 y.o. female patient who presented to [facility] ER 6/10 with initial c/o chest tightness/ pressure and tingling to right side. Noted to be hypertensive. EKG showed ectopic atrial rhythm with HR 87,  no acute STEMI. Trops obtained and noted to be improved with each draw. While in ER, patient was noted to have left sided weakness, facial droop and confusion.Initial CT of head without acute findings. Additional work up confirmed   Lg non-hemorrhagic acute R MCA infarct in addition to adrenal and liver massess. Neurology, heme/onc and IR consulted.    Subjective:   The patient complains of severe acute on chronic progressive fatigue and right sided weakness, aphasia, dysphagia partially relieved by rest, medications, PT,  OT,   SLP and rest and exacerbated by recent CVA.      I am concerned about patient’s medical complexities and barriers to advancing in rehab goals including decreasing stroke risk and reassessing and advancing her diet.        I discussed current functional, rehabilitation, medical status with other rehabilitation providers including nursing and case management.  According to recent nursing note, \" Up on her wheel chair. Denies pain or discomfort. Visitor in room. Pt. Consumed 100% of lunch.   Pt. Telemetry was discontinued. Last report 90 SR\".         ROS x10:  The patient also complains of severely impaired mobility and activities of daily living.  Otherwise no new problems with vision, hearing, nose, mouth, throat, dermal, cardiovascular, GI, , pulmonary, musculoskeletal, psychiatric or neurological. See Rehab H&P on Rehab chart dated .       Vital signs:  BP (!) 162/64   Pulse 64   Temp 97.5 °F (36.4 °C) (Oral)   Resp 16   Ht 1.626 m (5' 4\")   Wt 94.9 kg (209 lb 4.8 oz)   SpO2 96%   BMI 35.93 kg/m²   I/O:   PO/Intake:  fair PO intake, no problems observed or reported-upgraded to minced with moist no straws thin liquid    Bowel/Bladder:  continent, constipation and urinary 
A witnessed ease down to the floor, PCA with patient in effort for patient to use the RR.  Patient and PCA denies patient hitting her head and that patient did fall she eased her to the ground.  Patient VS assessed and a visual assessment complete.  No S/S of distress noted at this time.  With staff assistance patient was lifted back into the wheelchair and then to bed.  Electronically signed by Aziza Middleton RN on 6/16/2025 at 7:01 PM      
Agree with MARQUIS brown's assessment. Electronically signed by Carmen Mixon RN on 7/11/25 at 2:22 PM EDT   
Ambulance here to take pt home. Electronically signed by Shalini Cole RN on 7/16/2025 at 7:32 PM   
Assessment complete, VSS. Medicated twice thus far with Tylenol for c/o left arm pain. Lidoderm patches in place. LUE remains flaccid. Rash noted to back, Micotin powder ordered. LBM 7/6, refused laxatives and softeners. Electronically signed by Carmen Mixon RN on 7/7/25 at 3:51 PM EDT   
Assessment complete. Patient ate well for breakfast. Patient is A&OX4 Pt denies pain. Last BM 7/14/2025. .Electronically signed by RICKY MINER LPN on 7/14/25 at 10:03 AM EDT     Pt complains of pain in Right arm schedules [place] given @ 1200. Patient is comfortable up in chair. Wheels are locked, call light within reach. .Electronically signed by RICKY MINER LPN on 7/14/25 at 2:25 PM EDT   
Assessment completed . Pt ate well for breakfast. Pt's left side flaccid. Pt has some pain in left arm.Electronically signed by Shalini Cole RN on 7/13/2025 at 11:22 AM   
Assessment completed. A&O x4. Medicated with PRN Percocet for 8/10 pain to LUE. Lidoderm patches applied to LUE. /74, 67 this morning. Routine BP meds given per MAR. In chair with alarm activated. Call light in reach. Electronically signed by Amando Shin LPN on 6/30/2025 at 10:41 AM      Dr Carrie naranjo on unit. Aware of high BPs. New orders placed. Electronically signed by Amando Shin LPN on 6/30/2025 at 11:36 AM    
Assessment completed. Denies pain at this time. Nystatin swish and swallow for thrush to tongue. In bed with alarm activated. Call light in reach. Electronically signed by Amando Shin LPN on 6/25/2025 at 11:43 AM      UA order from 6/18 in system and never sent. UA obtained and sent to lab. Electronically signed by Amando Shin LPN on 6/25/2025 at 6:15 PM    
Assessment completed. Pt looking forward to going home. Pt was medicated for left arm pain. Pt went to  via w/c with therapy. Electronically signed by Shalini Cole RN on 7/16/2025 at 11:41 AM   
CLINICAL PHARMACY NOTE: MEDS TO BEDS    Total # of Prescriptions Filled: 0   The following medications were delivered to the patient:  11 prescriptions sent here however patient friend already picked all of them from [facility] in [place] before cancellations were sent... So we just put these prescriptions on file    Additional Documentation:    
CT scan reviewed. Hemangioma right lobe of the liver, no other abnormalities other than constipation symptoms.    Patient examined.  Abdomen soft and nondistended    Patient with some flatus.  Denies any nausea.  Tolerating liquids.    Diet advanced.    No surgical issues.  I will sign off.  Please call if any questions.  
[facility]      NAME:  Smooth Easley  ROOM: R254/R254-01  :  1975  DATE: 2025    Attempted to see Smooth Easley on this date for:   []  Initial Evaluation   [x]  Scheduled therapy    []  Make-up therapy   []  Group therapy   []  Family training    Patient was unable to be seen due to:   [] Off unit for testing.  Will re-attempt later this date.   [] Patient refused, stating \"    [] Therapy on hold due to     [] Nursing deferred due to   [x] Other: Attempted to see pt ahead of scheduled time due to schedule changes. Patient resting in bed with basin in front of her and friend at bedside. Friend and pt report that pt has been experiencing emesis all morning and is still feeling sick to her stomach. Will re-attempt scheduled therapy time later this date if patient is feeling better and able to participate.         Electronically signed by LEROY ALMODOVAR PTA on 25 at 9:07 AM EDT  
Comprehensive Nutrition Assessment    Type and Reason for Visit:  Initial, Consult    Nutrition Recommendations/Plan:   Continue Current Diet     Malnutrition Assessment:  Malnutrition Status:  No malnutrition (06/16/25 1446)      Nutrition Assessment:    Pt presents with swallowing difficulty s/p CVA, but reports good appetite/intake currently and pta, with no nutritional complaints. To continue to monitor for adequacy of intake with modified consistency diet.    Nutrition Related Findings:    PMH-htn, hld (lipitor), OA; admitted s/p CVA. Labs/meds reviewed. No edema noted. BSE 6/15-Pureed diet with mildly thick liquids; (6/16) advance to minced and moist with thin liquids per SLP. Wound Type: None       Current Nutrition Intake & Therapies:    Average Meal Intake: 51-75%, %     ADULT DIET; Dysphagia - Minced and Moist; no straws    Anthropometric Measures:  Height: 162.6 cm (5' 4\")  Ideal Body Weight (IBW): 120 lbs (55 kg)    Admission Body Weight: 94.9 kg (209 lb 3.5 oz) ([facility])  Current BMI (kg/m2):  35.9  Usual Body Weight: 91.2 kg (201 lb) (5/2023)                          BMI Categories: Obese Class 2 (BMI 35.0 -39.9)    Nutrition Diagnosis:   No nutrition diagnosis at this time     Nutrition Interventions:   Food and/or Nutrient Delivery: Continue Current Diet  Nutrition Education/Counseling: Education/Counseling not indicated  Coordination of Nutrition Care: Continue to monitor while inpatient       Goals:  Goals: PO intake 75% or greater  Type of Goal: New goal       Nutrition Monitoring and Evaluation:      Food/Nutrient Intake Outcomes: Food and Nutrient Intake  Physical Signs/Symptoms Outcomes: Weight, Biochemical Data, Chewing or Swallowing    Discharge Planning:    No discharge needs at this time     Becca Baltazar, RD, LD      
Comprehensive Nutrition Assessment    Type and Reason for Visit:  Reassess    Nutrition Recommendations/Plan:   Continue Current Diet     Malnutrition Assessment:  Malnutrition Status:  No malnutrition (06/16/25 1446)      Nutrition Assessment:    Pt presented with swallowing difficulty s/p CVA, tolerating progressive diet advancement per SLP. Pt reports continued good appetite/intake, with no nutritional complaints. To continue to monitor.    Nutrition Related Findings:    PMH-htn, hld (lipitor), OA; admitted s/p CVA. Labs/meds reviewed. No edema noted. BSE 6/15-Pureed diet with mildly thick liquids; (6/16) advanced to minced and moist with thin liquids; (6/24) advanced to soft and bite sized (with breads ok) per SLP. Wound Type: None       Current Nutrition Intake & Therapies:    Average Meal Intake: %     ADULT DIET; Dysphagia - Soft and Bite Sized; ok for breads    Anthropometric Measures:  Height: 162.6 cm (5' 4\")  Ideal Body Weight (IBW): 120 lbs (55 kg)    Admission Body Weight: 94.9 kg (209 lb 3.5 oz) (bedscale)  Current Body Weight: 204lb (6/22 bedscale)  Current BMI (kg/m2):  35.1  Usual Body Weight: 91.2 kg (201 lb) (5/2023)                          BMI Categories: Obese Class 2 (BMI 35.0 -39.9)    Nutrition Diagnosis:   No nutrition diagnosis at this time     Nutrition Interventions:   Food and/or Nutrient Delivery: Continue Current Diet  Nutrition Education/Counseling: Education/Counseling not indicated  Coordination of Nutrition Care: Continue to monitor while inpatient       Goals:  Goals: PO intake 75% or greater  Type of Goal: Continue current goal  Previous Goal Met: Goal(s) Achieved    Nutrition Monitoring and Evaluation:      Food/Nutrient Intake Outcomes: Food and Nutrient Intake  Physical Signs/Symptoms Outcomes: Weight, Biochemical Data, Chewing or Swallowing    Discharge Planning:    No discharge needs at this time     Becca Baltazar, RD, LD      
Comprehensive Nutrition Assessment    Type and Reason for Visit:  Reassess    Nutrition Recommendations/Plan:   Continue Current Diet     Malnutrition Assessment:  Malnutrition Status:  No malnutrition (06/16/25 1446)      Nutrition Assessment:    Pt reports continued good appetite/intake, with no nutritional complaints. Diet progressively advanced to regular consistency per SLP. To continue to monitor.    Nutrition Related Findings:    PMH-htn, hld (lipitor), OA; admitted s/p CVA. Labs (7/6)/meds reviewed. No edema noted. BSE 6/15-Pureed diet with mildly thick liquids; (6/16) advanced to minced and moist with thin liquids; (6/24) advanced to soft and bite sized (with breads ok) per SLP; (6/30) advanced to regular. +1 RUE edema noted. Wound Type: None       Current Nutrition Intake & Therapies:    Average Meal Intake: %     ADULT DIET; Regular; ok for breads    Anthropometric Measures:  Height: 162.6 cm (5' 4\")  Ideal Body Weight (IBW): 120 lbs (55 kg)    Admission Body Weight: 94.9 kg (209 lb 3.5 oz) ([facility])  Current Body Weight: 95.7 kg (210 lb 15.7 oz) (6/22),  Weight Source: Bed scale  Current BMI (kg/m2): 36.2  Usual Body Weight: 91.2 kg (201 lb) (5/2023)                          BMI Categories: Obese Class 2 (BMI 35.0 -39.9)    Nutrition Diagnosis:   No nutrition diagnosis at this time     Nutrition Interventions:   Food and/or Nutrient Delivery: Continue Current Diet  Nutrition Education/Counseling: Education/Counseling not indicated  Coordination of Nutrition Care: Continue to monitor while inpatient       Goals:  Goals: PO intake 75% or greater  Type of Goal: Continue current goal  Previous Goal Met: Goal(s) Achieved    Nutrition Monitoring and Evaluation:      Food/Nutrient Intake Outcomes: Food and Nutrient Intake  Physical Signs/Symptoms Outcomes: Weight, Biochemical Data, Chewing or Swallowing    Discharge Planning:    No discharge needs at this time     Becca Baltazar, RD, LD      
Comprehensive Nutrition Assessment    Type and Reason for Visit:  Reassess    Nutrition Recommendations/Plan:   Continue Current Diet     Malnutrition Assessment:  Malnutrition Status:  No malnutrition (06/16/25 1446)      Nutrition Assessment:    Pt reports continued good appetite/intake, with no nutritional complaints. Diet progressively advanced to regular consistency per SLP. To continue to monitor.    Nutrition Related Findings:    PMH-htn, hld (lipitor), OA; admitted s/p CVA. Labs/meds reviewed. No edema noted. BSE 6/15-Pureed diet with mildly thick liquids; (6/16) advanced to minced and moist with thin liquids; (6/24) advanced to soft and bite sized (with breads ok) per SLP; (6/30) advanced to regular. Wound Type: None       Current Nutrition Intake & Therapies:    Average Meal Intake: %     ADULT DIET; Regular; ok for breads    Anthropometric Measures:  Height: 162.6 cm (5' 4\")  Ideal Body Weight (IBW): 120 lbs (55 kg)    Admission Body Weight: 94.9 kg (209 lb 3.5 oz) ([facility])  Current Body Weight: 95.7 kg (210 lb 15.7 oz) (6/22),   Weight Source: Bed scale  Current BMI (kg/m2): 36.2  Usual Body Weight: 91.2 kg (201 lb) (5/2023)                          BMI Categories: Obese Class 2 (BMI 35.0 -39.9)    Nutrition Diagnosis:   No nutrition diagnosis at this time     Nutrition Interventions:   Food and/or Nutrient Delivery: Continue Current Diet  Nutrition Education/Counseling: Education/Counseling not indicated  Coordination of Nutrition Care: Continue to monitor while inpatient       Goals:  Goals: PO intake 75% or greater  Type of Goal: Continue current goal  Previous Goal Met: Goal(s) Achieved    Nutrition Monitoring and Evaluation:      Food/Nutrient Intake Outcomes: Food and Nutrient Intake  Physical Signs/Symptoms Outcomes: Weight, Biochemical Data, Chewing or Swallowing    Discharge Planning:    No discharge needs at this time     Becca Baltazar, FRANCES, LD      
Comprehensive Nutrition Assessment    Type and Reason for Visit:  Reassess    Nutrition Recommendations/Plan:   Continue Current Diet (minced and moist as per SLP)     Malnutrition Assessment:  Malnutrition Status:  No malnutrition (06/16/25 1446)      Nutrition Assessment:    Pt presents with swallowing difficulty s/p CVA, but reports continued good appetite/intake currently and pta, with no nutritional complaints. To continue to monitor.    Nutrition Related Findings:    PMH-htn, hld (lipitor), OA; admitted s/p CVA. Labs/meds reviewed. No edema noted. BSE 6/15-Pureed diet with mildly thick liquids; (6/16) advanced to minced and moist with thin liquids per SLP Wound Type: None       Current Nutrition Intake & Therapies:    Average Meal Intake: 51-75%, %     ADULT DIET; Dysphagia - Minced and Moist; no straws    Anthropometric Measures:  Height: 162.6 cm (5' 4\")  Ideal Body Weight (IBW): 120 lbs (55 kg)    [facility]: 94.[facility]209 [facility] 3.5 oz[facility]  Current Body Weight:  (n/a-please obtain updated weight after bed is zeroed.)  Current BMI (kg/m2):  35.9  Usual Body Weight: 91.2 kg (201 lb) (5/2023)                          BMI Categories: Obese Class 2 (BMI 35.0 -39.9)      Nutrition Diagnosis:   No nutrition diagnosis at this time     Nutrition Interventions:   Food and/or Nutrient Delivery: Continue Current Diet (minced and moist as per SLP)  Nutrition Education/Counseling: Education/Counseling not indicated  Coordination of Nutrition Care: Continue to monitor while inpatient       Goals:  Goals: PO intake 75% or greater  Type of Goal: Continue current goal  Previous Goal Met: Goal(s) Achieved    Nutrition Monitoring and Evaluation:      Food/Nutrient Intake Outcomes: Food and Nutrient Intake  Physical Signs/Symptoms Outcomes: Weight, Biochemical Data, Chewing or Swallowing    Discharge Planning:    No discharge needs at this time     Becca Baltazar, RD, LD      
DVT / VTE PROPHYLAXIS EVALUATION    Recent Labs     06/12/25  0516 06/12/25  0517 06/13/25  0526 06/14/25  0511   BUN 14  --  15 19   CREATININE 0.63  --  0.54 0.47*   PLT  --    < > 412* 404*   HGB  --    < > 15.0 15.4   HCT  --    < > 43.9 45.1    < > = values in this interval not displayed.     ADMITTING DX OR CHIEF COMPLAINT? none  WARFARIN? DOAC'S? no  ANY APPARENT BLEEDING? no  SCHEDULED SURGERY? no     If yes to following, excluded from auto adjustment in Table 1 of policy - please contact provider with recommendations as appropriate.  Include condition/exception in scratch notes. Yes No   Trauma Service or Ortho Surgery []  []    Pregnancy []  []        Current order:  Enoxaparin 40 mg SUBQ once daily       ,    Estimated Creatinine Clearance: 142 mL/min (A) (based on SCr of 0.47 mg/dL (L)).    Plan:  No intervention recommended, continue current VTE prophylaxis as ordered     Patient Weight (kg)      50.9 and below .9 101-150.9 151-174.9 175 or greater   Estimated   CrCl  (ml/min) 30 or greater []   30 mg   SUBQ daily   [x]   40 mg   SUBQ daily (or 30 mg BID for orthopedic cases) []  30 mg SUBQ   BID*  []  40 mg   SUBQ   BID []  60mg SUBQ BID    15-29.9 []  UFH 5000   units SUBQ BID []  30 mg   SUBQ daily [] 30 mg SUBQ   daily []  40 mg SUBQ   daily [] 60 mg SUBQ   Daily*    Less than 15 or dialysis []  UFH 5000   units SUBQ BID [] UFH 5000 units SUBQ TID []  UFH 7500   units   SUBQ TID*   *Do not exceed enoxaparin 40mg daily or UFH 5000 units SUBQ TID in patients with epidurals,   lumbar drains, or external ventricular drains       Rosa Elena Mesa RPH PharmD  
[facility]  Occupational Therapy      NAME:  Smooth Easley  ROOM: R254/R254-01  :  1975  DATE: 7/15/2025    Attempted to see Smooth Easley on this date at 0732 for    []  Initial Evaluation   []  Scheduled therapy    []  Make-up therapy   []  Group therapy   [x]  Get up and go    Patient was unable to be seen due to:   [] Off unit for testing/procedure   [] Patient refused, stating \"    [] Therapy on hold due to     [] Nursing deferred due to    [x] Other:  Pt resting in bed. Declines to get into w/c at this time. Only requests additional coffee. Pt declines other needs      Electronically signed by Mauro Walter OT on 7/15/25 at 8:00 AM EDT  
Facility/Department: [facility] [place] [facility]  Rehabilitation Initial Assessment: Physical Therapy  Room: R261R261-01    NAME: Smooth Easley  : 1975  MRN: [id number]    Date of Service: 6/15/2025    Rehab Diagnosis(es): CVA  Patient Active Problem List    Diagnosis Date Noted    Cerebrovascular accident (CVA) (HCC) 2025    Liver mass, right lobe 2025    TIA (transient ischemic attack) 06/10/2025    Low back pain 2023       History reviewed. No pertinent past medical history.  Past Surgical History:   Procedure Laterality Date    CHOLECYSTECTOMY         Patient assessed for rehabilitation services?: Yes  Family/Caregiver Present: No  Diagnosis: CVA    Restrictions:  Restrictions/Precautions: Modified Diet, Swallowing - Thickened Liquids, Fall Risk     SUBJECTIVE:    Pain  Pain  Pre-Pain: 0  Post-Pain: 0       Prior Level of Function:  Social/Functional History  Lives With: Daughter  Type of Home: House  Home Layout: Multi-level, Laundry in basement, Bed/Bath upstairs (4 steps between levels- split level- pts level is downstairs)  Home Access: Stairs to enter with rails  Entrance Stairs - Number of Steps: 4-5?- \"I would have to ask my dtr how many steps there are\"  Entrance Stairs - Rails: Left  Bathroom Shower/Tub: Tub/Shower unit  Bathroom Equipment: None  Home Equipment: None  Has the patient had two or more falls in the past year or any fall with injury in the past year?: No  Prior Level of Assist for ADLs: Independent  Prior Level of Assist for Homemaking: Independent  Prior Level of Assist for Ambulation: Independent household ambulator, with or without device, Independent community ambulator, with or without device  Prior Level of Assist for Transfers: Independent  Active : Yes  Education: Patient completed high school  Occupation: Full time employment  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 
[place] [facility] [place] [facility]  Rehabilitation Initial Assessment: Occupational Therapy  Room: R261/R261-01    NAME: Smooth Easley  : 1975  MRN: [id number]    Date of Service: 6/15/2025    Rehab Diagnosis(es): Impaired mobility and ADL's due to R MCA infarct  Patient Active Problem List    Diagnosis Date Noted    Cerebrovascular accident (CVA) (HCC) 2025    Liver mass, right lobe 2025    TIA (transient ischemic attack) 06/10/2025    Low back pain 2023     No data found    History reviewed. No pertinent past medical history.  Past Surgical History:   Procedure Laterality Date    CHOLECYSTECTOMY         Restrictions:  Restrictions/Precautions  Restrictions/Precautions: Modified Diet;Swallowing - Thickened Liquids;Fall Risk    Subjective:  General  Chart Reviewed: Yes  Referring Practitioner: Dr Todd for Dr Ramirez  Diagnosis: Impaired mobility and ADL's due to R MCA infarct  General Comment  Comments: 49 y.o. female patient who presented to [facility] ER 6/10 with initial c/o chest tightness/ pressure and tingling to right side. Noted to be hypertensive. EKG showed ectopic atrial rhythm with HR 87,  no acute STEMI. Trops obtained and noted to be improved with each draw. While in ER, patient was noted to have left sided weakness, facial droop and confusion.Initial CT of head without acute findings. Additional work up confirmed   Lg non-hemorrhagic acute R MCA infarct in addition to adrenal and liver massess. Neurology, heme/onc and IR consulted.    Patient's date of birth confirmed: Yes     Pain at start of treatment: No    Pain at end of treatment: No      Social Functional:  Social/Functional History  Lives With: Daughter (20 year old)  Type of Home: House  Home Layout: Multi-level;Laundry in basement;Bed/Bath upstairs (4 steps between levels)  Home Access: Stairs to enter with rails  Entrance Stairs - Number of Steps: 5 from the outside or 4 inside steps from side door  Entrance 
I was asked by nursing staff to see patient regarding elevated blood pressure    Patient is sitting in a chair.  She denies any headaches, chest pain    Her systolic blood pressure was up to 180 yesterday.  I started her on amlodipine 5 mg daily    Her blood pressure today is better.  She is on hydralazine as well as 25 mg twice daily    My plan is to increase her amlodipine up to 10 mg daily starting tomorrow and discontinue hydralazine.    Continue to titrate and adjust.    *Few other medical issues not listed above.  Incidental finding seen on labs and imaging.  To be addressed by other providers such as neurology, physiatrist, oncologist.    Pharmacological DVT prophylaxis is to be addressed by physiatrist based on patient's ambulation status and risk of DVT    We may not follow patient daily.  We follow patient only on an as-needed basis.  Please call or alert hospitalist if patient develops any signs or symptoms that may require physical evaluation and intervention.    Some of the medical and surgical issues are managed by medical and surgical specialists..  Defer further needed diagnostic and therapeutic intervention relative to specialty care to specialists.    Patient would need to follow-up with  primary care doctor and other needed outpatient providers to address all of his chronic, subacute medical issues and all of the abnormalities seen on labs and imaging that have not been addressed during this stay on the rehab unit.    I will be off service starting Sunday evening.  Patient would be managed by one of my colleagues after that.    On discharge, please make sure that patient has an outpatient appointment with his primary care doctor as well as all of the other specialists that had seen here at [facility].  Please encourage patient to follow-up with other specialists outside [facility] system that pt had seen prior to arrival to [facility] admission.        
INDIVIDUALIZED OVERALL REHAB PLAN OF CARE  ADDENDUM TO REHAB PROGRESS NOTE-for audit purposes must also refer to this day's clinical note and combine the information      Date: 2025  Patient Name: Smooth Easley   Room: R254/R254-01    MRN: [id number]    : 1975  (49 y.o.)  Gender: female       Today 2025 during weekly team meeting, I reviewed the patient Smooth Easley in detail with the therapists and nurses involved in patient's care gathering complex physiatric data regarding current medical issues, progress in therapies, factors limiting progress, social issues, psychological issues, ongoing therapeutic plans and discharge planning.  I was present in the PM& R department in [facility].  A  video monitor live feed between the interdisciplinary team participants in the team conference was used to supplement connectivity and facilitate record review and allow immediate access to the EMR to allow real time review of the records and immediately address pertinent issues.   Our program views this an essential process to maintaining the integral rehab physician leadership role in team and the interdisciplinary discussion of our patient.    Legend:  I= independent Im =Modified independent  S=Supervised SB=stand by PEREZ=set up CG=contact stephanie Min= minimal Mod=Moderate Max=maximal Max of 2 =maximal assist of 2 people      CURRENT FUNCTIONAL STATUS:    49 y.o. female patient who presented to [facility] ER 6/10 with initial c/o chest tightness/ pressure and tingling to right side. Noted to be hypertensive. EKG showed ectopic atrial rhythm with HR 87,  no acute STEMI. Trops obtained and noted to be improved with each draw. While in ER, patient was noted to have left sided weakness, facial droop and confusion.Initial CT of head without acute findings. Additional work up confirmed   Lg non-hemorrhagic acute R MCA infarct in addition to adrenal and liver massess. Neurology, 
INDIVIDUALIZED OVERALL REHAB PLAN OF CARE  ADDENDUM TO REHAB PROGRESS NOTE-for audit purposes must also refer to this day's clinical note and combine the information      Date: 2025  Patient Name: Smooth Easley   Room: R254/R254-01    MRN: [id number]    : 1975  (49 y.o.)  Gender: female       Today 2025 during weekly team meeting, I reviewed the patient Smooth Easley in detail with the therapists and nurses involved in patient's care gathering complex physiatric data regarding current medical issues, progress in therapies, factors limiting progress, social issues, psychological issues, ongoing therapeutic plans and discharge planning.  I was present in the PM& R department in [facility].  A  video monitor live feed between the interdisciplinary team participants in the team conference was used to supplement connectivity and facilitate record review and allow immediate access to the EMR to allow real time review of the records and immediately address pertinent issues.   Our program views this an essential process to maintaining the integral rehab physician leadership role in team and the interdisciplinary discussion of our patient.    Legend:  I= independent Im =Modified independent  S=Supervised SB=stand by PEREZ=set up CG=contact stephanie Min= minimal Mod=Moderate Max=maximal Max of 2 =maximal assist of 2 people      CURRENT FUNCTIONAL STATUS:    49 y.o. female patient who presented to [facility] ER 6/10 with initial c/o chest tightness/ pressure and tingling to right side. Noted to be hypertensive. EKG showed ectopic atrial rhythm with HR 87,  no acute STEMI. Trops obtained and noted to be improved with each draw. While in ER, patient was noted to have left sided weakness, facial droop and confusion.Initial CT of head without acute findings. Additional work up confirmed   Lg non-hemorrhagic acute R MCA infarct in addition to adrenal and liver massess. Neurology, 
INDIVIDUALIZED OVERALL REHAB PLAN OF CARE  ADDENDUM TO REHAB PROGRESS NOTE-for audit purposes must also refer to this day's clinical note and combine the information      Date: 2025  Patient Name: Smooth Easley   Room: R254/R254-01    MRN: [id number]    : 1975  (49 y.o.)  Gender: female       Today 2025 during weekly team meeting, I reviewed the patient Smooth Easley in detail with the therapists and nurses involved in patient's care gathering complex physiatric data regarding current medical issues, progress in therapies, factors limiting progress, social issues, psychological issues, ongoing therapeutic plans and discharge planning.  I was present in [place] PM[place] [facility]  A  video monitor live feed between the interdisciplinary team participants in the team conference was used to supplement connectivity and facilitate record review and allow immediate access to the EMR to allow real time review of the records and immediately address pertinent issues.   Our program views this an essential process to maintaining the integral rehab physician leadership role in team and the interdisciplinary discussion of our patient.    Legend:  I= independent Im =Modified independent  S=Supervised SB=stand by PEREZ=set up CG=contact stephanie Min= minimal Mod=Moderate Max=maximal Max of 2 =maximal assist of 2 people      CURRENT FUNCTIONAL STATUS:    49 y.o. female patient who presented to [facility] ER 6/10 with initial c/o chest tightness/ pressure and tingling to right side. Noted to be hypertensive. EKG showed ectopic atrial rhythm with HR 87,  no acute STEMI. Trops obtained and noted to be improved with each draw. While in ER, patient was noted to have left sided weakness, facial droop and confusion.Initial CT of head without acute findings. Additional work up confirmed   Lg non-hemorrhagic acute R MCA infarct in addition to adrenal and liver massess. Neurology, 
INDIVIDUALIZED OVERALL REHAB PLAN OF CARE  ADDENDUM TO REHAB PROGRESS NOTE-for audit purposes must also refer to this day's clinical note and combine the information      Date: 2025  Patient Name: Smooth Easley   Room: R261/R261-01    MRN: [id number]    : 1975  (49 y.o.)  Gender: female       Today 2025 during weekly team meeting, I reviewed the patient Smooth Easley in detail with the therapists and nurses involved in patient's care gathering complex physiatric data regarding current medical issues, progress in therapies, factors limiting progress, social issues, psychological issues, ongoing therapeutic plans and discharge planning.  I was present in the PM& R department in [facility].  A  video monitor live feed between the interdisciplinary team participants in the team conference was used to supplement connectivity and facilitate record review and allow immediate access to the EMR to allow real time review of the records and immediately address pertinent issues.   Our program views this an essential process to maintaining the integral rehab physician leadership role in team and the interdisciplinary discussion of our patient.    Legend:  I= independent Im =Modified independent  S=Supervised SB=stand by PEREZ=set up CG=contact stephanie Min= minimal Mod=Moderate Max=maximal Max of 2 =maximal assist of 2 people      CURRENT FUNCTIONAL STATUS:    49 y.o. female patient who presented to [facility] ER 6/10 with initial c/o chest tightness/ pressure and tingling to right side. Noted to be hypertensive. EKG showed ectopic atrial rhythm with HR 87,  no acute STEMI. Trops obtained and noted to be improved with each draw. While in ER, patient was noted to have left sided weakness, facial droop and confusion.Initial CT of head without acute findings. Additional work up confirmed   Lg non-hemorrhagic acute R MCA infarct in addition to adrenal and liver massess. Neurology, 
Medicated with PRN Percocet for 5/10 pain to LUE. LUE elevated on a pillow. Pur wick in place. In bed with alarm activated. Call light in reach. Electronically signed by Amando Shin LPN on 7/2/2025 at 9:28 PM    
[facility]  Facility/Department: [facility]  Speech Language Pathology   Treatment Note          Smooth Easley  1975  R254/R254-01  [x]   confirmed    Date: 2025      Restrictions/Precautions: Fall Risk, Modified Diet     ADULT DIET; Dysphagia - Soft and Bite Sized; No Drinking Straws; ok for breads     Respiratory Status:   Room air  No active isolations    Rehab Diagnosis: CVA     Subjective:  Alert and Cooperative      Improved affect noted    Interventions used this date:  Cognitive Skill Development, Dysphagia Treatment, Oral Motor Treatment, and Estim/NMES    Objective/Assessment:  Patient progressing towards goals:  Goal 1: Patient will be able to demonstrate appropriate diaphragmatic breathing for speech production at sentence level with stand by cues with 90% accuracy.  Goal 2: Patient will increase maximum phonation time from 5 seconds to > 10 seconds with stand by cues with 90% accuracy.  Goal 3: Patient will complete mid level visuospatial tasks related to reading with use of a visual strategy with stand by cues with 90% accuracy.  Mod verbal cues needed during sequencing task to scan to left to locate picture card.  Goal 4: Patient will maintain eye contact/visual attention to communication partner positioned on patient's left side with stand by cues.  Stand by cues needed in unstructured tasks.  Goal met.  Goal 5: Patient will complete abstract verbal reasoning tasks (i.e. inferences, similarities/differences, sequencing) with stand by cues with 90% accuracy.  Sequenced 4 pictures in correct order in 1/3 trials.  Mod cues to correct errors.    Goal 1: Patient will tolerate minced and moist and thin liquids with adequate mastication, oral clearance, and no s/s of aspiration in 10/10 trials.  Goal 2: Patient will demonstrate recommended swallow strategies for safe and efficient swallow at supervised level with 100% accuracy.  Goal 3: Patient will complete labial/buccal/lingual 
[facility]  Facility/Department: [facility]  Speech Language Pathology   Treatment Note          Smooth Easley  1975  R254/R254-01  [x]   confirmed    Date: 2025      Restrictions/Precautions: Fall Risk    ADULT DIET; Regular    Respiratory Status: Room air  No active isolations    Rehab Diagnosis: CVA     Subjective:  Alert and Cooperative        Interventions used this date:  Therapeutic Meal Monitor    Objective/Assessment:  Patient progressing towards goals:  Goal 1: Patient will tolerate regular with thin liquids with adequate mastication and oral clearance with no overt s/s of difficulty or aspiration.  Pt seen for meal monitor of lunch.  Pt able to open containers and feed self.  Pt consumed pizza with crust, macaroni and cheese, and sips of coffee with no overt s/s of aspiration.  Pt had residue on left side of mouth which she independently cleared with delay.  Pt independently cleared left sulcus residue with lingual or finger sweep.  Goal met  Goal 2: Patient will demonstrate recommended swallow strategies for safe and efficient swallow at independent level.  Pt independent with swallow strategies.  Pt reported taking a large bite of her cheeseburger one day and was \"choking\" but knew she took too large of a bite.  Pt stated, \"I know I have to take small bites.\"  Goal met  Goal 3: goal met  Goal 4: Patient will complete oral motor ROM/strengthening/coordination exercises 10x/each in conjunction with NMES (e-stim) to left facial musculature (as able) to improve management of bolus during oral intake.  Goal 5: Pt will complete tongue press, tongue pull back, and effortful swallow exercises 10x/each (with Abilex device as able) with stand by cues in order to strengthen the muscles of the swallow to increase base of tongue/posterior wall approximation.  Goal 6: Pt will complete falsetto & effortful pitch glide exercise 10x/each with min cues in order to strengthen the muscles of the swallow to 
[facility]  Facility/Department: [facility]  Speech Language Pathology   Treatment Note          Smooth Easley  1975  R254/R254-01  [x]   confirmed    Date: 2025      Restrictions/Precautions: Fall Risk, Modified Diet     ADULT DIET; Dysphagia - Soft and Bite Sized; No Drinking Straws; ok for breads     Respiratory Status:   Room air  No active isolations    Rehab Diagnosis: CVA     Subjective:  Alert, Cooperative, Pleasant, and Motivated        Interventions used this date:  Speech Production, Cognitive Skill Development, Dysphagia Treatment, Oral Motor Treatment, Estim/NMES, and Voice Treatment    Objective/Assessment:  Patient progressing towards goals:  Goal 1: Patient will be able to demonstrate appropriate diaphragmatic breathing for speech production at sentence level with stand by cues with 90% accuracy.  Pt read aloud sentences (written directions for sequencing task) with mod cues for visual attention and scanning and 90% speech accuracy.    Goal 2: Patient will increase maximum phonation time from 5 seconds to > 10 seconds with stand by cues with 90% accuracy.  Goal 3: Patient will complete mid level visuospatial tasks related to reading with use of a visual strategy with stand by cues with 90% accuracy.  Goal 4: goal met  Goal 5: Patient will complete abstract verbal reasoning tasks (i.e. inferences, similarities/differences, sequencing) with stand by cues with 90% accuracy.  Figural sequencing: pt identified next shape in sequence (one factor difference) with mod cues throughout task in 2/5x.  Pt had difficulty correcting errors despite cues.  Cues needed for explaining answers.  Pt sequenced playing cards 2-10, alternating hearts and diamonds, with 20% accuracy, increasing with mod cues throughout task.  Pt identified error 1x, towards end of task.  Pt completed mental manipulation task of retaining 4 words heard aloud to answer question that followed with stand by cues with 90% 
[facility]  [place] [facility]  Speech Language Pathology   Treatment Note          Smooth Easley  1975  R254/R254-01  [x]   confirmed    Date: 2025      Restrictions/Precautions: Fall Risk    ADULT DIET; Regular    Respiratory Status:  Room air  No active isolations    Rehab Diagnosis: CVA     Subjective:  Alert, Cooperative, Pleasant, and Motivated        Interventions used this date:  Cognitive Skill Development    Objective/Assessment:  Patient progressing towards goals:  Goal 1: Patient will be able to demonstrate appropriate diaphragmatic breathing for speech production at sentence level with stand by cues with 90% accuracy.  Goal 2: Patient will increase maximum phonation time from 5 seconds to > 10 seconds with stand by cues with 90% accuracy.  Goal 3: Patient will complete mid level visuospatial tasks related to reading with use of a visual strategy with stand by cues with 90% accuracy.  Pt completed worksheet task with paper on clipboard, with pt positioning it to right while sitting upright in bed. Pt demonstrated adequate visual scanning with only one item missed.   Goal 4: goal met  Goal 5: Patient will complete abstract verbal reasoning tasks (i.e. inferences, similarities/differences, sequencing) with stand by cues with 90% accuracy.  Pt completed 3-4 word sequencing task on worksheet with 73% accuracy independently.  Pt corrected errors with min cues.  Improved sequencing with visual task.  Pt gave verbal sequencing when taking steps in PT.        Treatment/Activity Tolerance:  Patient tolerated treatment well    Plan:  Continue per POC    Pain Assessment:  Patient does not c/o pain.    Pain Re-assessment:  Patient does not c/o pain.    Patient/Caregiver Education:  Patient educated on session and progression towards goals.  Patient stated verbal understanding of directions.    Safety Devices:  Bed alarm in place and Call light within reach        Speech Therapy Level of 
[facility]   Facility/Department: [facility]  Speech Language Pathology  Discharge Report        Patient: Smooth Easley  : 1975    Date: 7/15/2025    Initial Status:  Diet:   Puree diet with mildly thick liquids  Dysphagia Outcome Severity Scale:  Rating: 3    Speech Therapy Level of Assistance Scale:  Auditory Comprehension:  Rating: Supervised Assistance  Verbal Expression:  Rating:Supervised Assistance  Motor Speech:  Rating: Minimal Assistance  Problem Solving:  Rating: Supervised Assistance  Memory:  Rating: Modified Independent      Long Term Goals:  Long Term Goals  Time Frame for Long Term Goals: 2-3 weeks  Goal 1: Patient will improve Speech Intelligibility to an Independent level for effective communication of wants, needs, feelings, ideas, and medical/safety information with familiar and unfamiliar listeners.  Goal 2: Patient will demonstrate functional cognitive-linguistic abilities in all opportunities at a mod I level in order to safely complete ADLs.  Long-term Goals  Timeframe for Long-term Goals: 2-3 weeks  Goal 1: Patient will tolerate least restrictive diet with no adverse outcomes.        Patient's Response to Therapy:  Patient participated in speech therapy sessions targeting swallowing, speech, and cognition.  Patient had excellent motivation and participation.  Patient  made progress in all areas.  Pt's diet was gradually advanced to regular diet with thin liquids.  Pt is independent with swallow strategies.  Pt made progress with left side oral motor functioning with and without e-stim treatment.  Visual neglect and problem solving have improved however continued min assist is needed with problem solving.  Patient also continues to work on voice inflection.      Discharge Status:  Diet:   ADULT DIET; Regular; ok for breads  Compensatory Swallowing Strategies : Upright as possible for all oral intake, Small bites/sips, Eat/Feed slowly, Swallow 2 times per bite/sip, Check for 
[facility]  Facility/Department: [facility] [place] [facility]  Speech Language Pathology   Treatment Note          Smooth Easley  1975  R261/R261-01  [x]   confirmed    Date: 2025      Restrictions/Precautions: Modified Diet, Fall Risk     ADULT DIET; Dysphagia - Minced and Moist; no straws     Respiratory Status:   Room air  No active isolations    Rehab Diagnosis: CVA     Subjective:  Alert and Cooperative        Interventions used this date:  Speech Production, Dysphagia Treatment, Oral Motor Treatment, and Estim/NMES    Objective/Assessment:  Patient progressing towards goals:  Goal 1: Patient will complete oral motor drills paired with speech sounds to improve speech accuracy with 90% accuracy.  Goal 2: Patient will produce multi-syllabic words in sentences with 90% accuracy.  Pt read aloud 4 syllable words with 90% speech accuracy.  Stand by cues to increase volume.  Pt read aloud sentences with 100% speech accuracy.  Goal 3: Patient will complete mid level visuospatial tasks related to reading with use of a visual strategy with stand by cues with 90% accuracy.  No errors with reading aloud when written words placed right of midline.  Goal 4: Patient will maintain eye contact/visual attention to communication partner positioned on patient's left side with stand by cues.  Min cues to make eye contact with SLP, improved rate with achieving.    Goal 5: Patient will complete abstract verbal reasoning tasks (i.e. inferences, similarities/differences, sequencing) with stand by cues with 90% accuracy.    Goal 1: Patient will tolerate minced and moist and thin liquids with adequate mastication, oral clearance, and no s/s of aspiration in 10/10 trials.  No overt s/s of aspiration of water from cup in 4 trials.  Goal 2: Patient will demonstrate recommended swallow strategies for safe and efficient swallow at supervised level with 100% accuracy.  Pt independently presented cup to right side of mouth and took small sip and 
[facility]  Facility/Department: [facility] [place] [facility]  Speech Language Pathology   Treatment Note          Smooth Easley  1975  R254/R254-01  [x]   confirmed    Date: 7/10/2025      Restrictions/Precautions: Fall Risk    ADULT DIET; Regular    Respiratory Status: Room air  No active isolations    Rehab Diagnosis: CVA     Subjective:  Alert, Cooperative, Pleasant, and Motivated        Interventions used this date:  Dysphagia Treatment, Oral Motor Treatment, Estim/NMES, and Voice Treatment    Objective/Assessment:  Patient progressing towards goals:  Goal 1: Patient will be able to demonstrate appropriate diaphragmatic breathing for speech production at sentence level with stand by cues with 90% accuracy.  Adequate respiration for speech production in sentences in conversation.  Occasional vocal strain noted.  Goal 2: Patient will increase maximum phonation time from 5 seconds to > 10 seconds with stand by cues with 90% accuracy.  Sustained vowels x 5 with average duration of 6 seconds.  Goal 3: Patient will complete mid level visuospatial tasks related to reading with use of a visual strategy with stand by cues with 90% accuracy.  Goal 4: goal met  Goal 5: Patient will complete abstract verbal reasoning tasks (i.e. inferences, similarities/differences, sequencing) with stand by cues with 90% accuracy.    Goal 1: Patient will complete oral motor ROM/strengthening/coordination exercises 10x/each in conjunction with NMES (e-stim) to left facial musculature (as able) to improve management of bolus during oral intake.  Patient received JASE/e-stim to the left facial musculature for 20 minutes, 30 Hz, 50 microseconds, 5 sec on/15 sec off duty cycle with the intensity of 8-9 while performing labial, facial, and buccal ROM/strengthening/coordination exercises 10x/each with stand by-min cues.  Goal 2: Pt will complete tongue press, tongue pull back, and effortful swallow exercises 10x/each (with Abilex device as able) with 
[facility]   Facility/Department: [facility] [place] [facility]  Speech Language Pathology    Smooth Easley  1975  [id number]/[id number]    Date: 7/5/2025      Speech Therapy attempted to see Smooth Riddlesandra on this date for a/an:    Treatment    Pt was unable to be seen due to:   Other: Pt beginning patient care and bathing with PCA. Pt then had OT/PT scheduled.        Electronically signed by LUZ MARINA Samuel on 7/5/25 at 10:54 AM EDT    
[facility]   Facility/Department: [facility] [place] [facility]  Speech Language Pathology  Initial Speech/Language/Cognitive Assessment    NAME:Smooth Easley  : 1975 (49 y.o.)   [x]   confirmed    MRN: [id number]  ROOM: [place] [address]  ADMISSION DATE: 2025  PATIENT DIAGNOSIS(ES): Impaired mobility and activities of daily living [Z74.09, Z78.9]  No chief complaint on file.    Patient Active Problem List    Diagnosis Date Noted    Dysphagia, oropharyngeal phase 2025    Aphasia 2025    Adrenal mass 2025    Impaired mobility  & ADLs dt CVA 06/15/2025    Cerebrovascular accident (CVA) (HCC) 2025    Liver mass, right lobe 2025    TIA (transient ischemic attack) 06/10/2025    Low back pain 2023     Past Medical History:   Diagnosis Date    Osteoarthritis      Past Surgical History:   Procedure Laterality Date    CHOLECYSTECTOMY         Date of Onset: 6/10/2025  Rehab Diagnosis: CVA    Date of Evaluation: 2025   Evaluating Therapist: LUZ MARINA Ham        Diagnosis: Mild dysarthria characterized by decreased breath support, imprecise speech, and occasional decreased intelligibility in conversation.  Mild cognitive linguistic impairment characterized by flat affect, left visual neglect, decreased attention to details when following 3 step directions, and verbal sequencing.    Requires SLP Intervention: Yes          General  Subjective  Subjective: Narrative of hospital course/history of present illness: 49 y.o. female patient who presented to [facility] ER 6/10 with initial c/o chest tightness/ pressure and tingling to right side. Noted to be hypertensive. EKG showed ectopic atrial rhythm with HR 87,  no acute STEMI. Trops obtained and noted to be improved with each draw. While in ER, patient was noted to have left sided weakness, facial droop and confusion.Initial CT of head without acute findings. Additional work up confirmed   Lg non-hemorrhagic acute R MCA infarct in 
[facility]  Facility/Department: [facility] [place] [facility]  Speech Language Pathology   Treatment Note          Smooth Easley  1975  [id number]/R254-01  [x]   confirmed    Date: 2025      Restrictions/Precautions: Fall Risk    ADULT DIET; Regular    Respiratory Status: Room air  No active isolations    Rehab Diagnosis: CVA     Subjective:  Alert and Cooperative      Pt seen in room with daughter, Leona, present.  Pt demonstrated brighter affect this date with increase in smiling, laughing, and initiation.    Interventions used this date:  Cognitive Skill Development and Voice Treatment    Objective/Assessment:  Patient progressing towards goals:  Goal 1: Patient will be able to demonstrate appropriate diaphragmatic breathing for speech production at sentence level with stand by cues with 90% accuracy.  Goal 2: Patient will increase maximum phonation time from 5 seconds to > 10 seconds with stand by cues with 90% accuracy.  Pt used diaphragmatic breath with cues to decreased facial and shoulder tension.  Pt sustained vowels x 6 trials with average duration of 9-10 seconds.  Improved performance.  Goal 3: Patient will complete mid level visuospatial tasks related to reading with use of a visual strategy with stand by cues with 90% accuracy.  Goal 4: goal met  Goal 5: Patient will complete abstract verbal reasoning tasks (i.e. inferences, similarities/differences, sequencing) with stand by cues with 90% accuracy.  Pt completed 3 word verbal sequencing heard aloud in 1/6 trials, increasing to 6/6 trials with mod cues.  Pt able to talk it through without assist x 2.  Pt reported she is more of a visual person.  Educated pt on rationale for task in which she gave good verbal understanding.    Goal 1: Patient will tolerate regular with thin liquids with adequate mastication and oral clearance with no overt s/s of difficulty or aspiration.  Goal 2: Patient will demonstrate recommended swallow strategies for safe and efficient 
[facility]  [facility]  Speech Language Pathology   Treatment Note      Smooth Easley  1975  R261/R261-01  [x]   confirmed      Date: 2025    Impaired mobility and activities of daily living [Z74.09, Z78.9]    Restrictions/Precautions: Modified Diet, Fall Risk    ADULT DIET; Dysphagia - Minced and Moist; no straws     Respiratory Status:  Room air  No active isolations      Subjective:  Alert and Cooperative        Interventions used this date:  Therapeutic Meal Monitor      Objective/Assessment:  Patient was seen for therapeutic meal monitor to assess tolerance of recommended diet.  Patient feeding self potato chowder soup daughter brought in from home.  Pt independently using strategies of small bites, slow rate, presentation to right side of mouth, and double swallow.  Pt tolerated sips of pop from can independently using small sip and chin tuck.  No overt s/s of aspiration occurred.  Pt used swab to clear small pocketing of potato from left lateral sulcus.    Rec: continue with minced and moist with thin liquids      Treatment/Activity Tolerance:  Patient tolerated treatment well    Plan:  Continue per POC    Pain Assessment:  Patient does not c/o pain.    Pain Re-assessment:  Patient does not c/o pain.    Patient/Caregiver Education:  Patient educated on session and progression towards goals.  Patient stated verbal understanding of directions.    Safety Devices:  Call light within reach and Chair alarm in place      Dysphagia Outcome Severity Scale    SWALLOWING  Ratin      Therapy Time  SLP Individual Minutes  Time In: 1150  Time Out: 1158  Minutes: 8            Signature: Electronically signed by LUZ MARINA Ham on 2025 at 3:29 PM            
[facility]  Facility/Department: [facility]  Speech Language Pathology   Treatment Note          Smooth Easley  1975  R254/R254-01  [x]   confirmed    Date: 2025      Restrictions/Precautions: Fall Risk    ADULT DIET; Regular    Respiratory Status:  Room air  No active isolations    Rehab Diagnosis: CVA     Subjective:  Alert, Cooperative, and Motivated        Interventions used this date:  Cognitive Skill Development, Oral Motor Treatment, Estim/NMES, and Voice Treatment    Objective/Assessment:  Patient progressing towards goals:  Goal 1: Patient will be able to demonstrate appropriate diaphragmatic breathing for speech production at sentence level with stand by cues with 90% accuracy.  Pt demonstrated adequate breath to sustain sentences when repeating back with 100% accuracy.  Goal 2: Patient will increase maximum phonation time from 5 seconds to > 10 seconds with stand by cues with 90% accuracy.  Pt completed with average duration of 6-7 seconds.  Pt used effortful phonation, using resistance which increased loudness in trials however not duration.  Goal 3: Patient will complete mid level visuospatial tasks related to reading with use of a visual strategy with stand by cues with 90% accuracy.  Pt followed 2 step written directions involving 4 components on worksheet (red line made on left side of page) with 100% accuracy independently.   Goal 4: goal met  Goal 5: Patient will complete abstract verbal reasoning tasks (i.e. inferences, similarities/differences, sequencing) with stand by cues with 90% accuracy.  Pt identified picture that doesn't belong from choice of 4 with 80% accuracy.  Pt explained reason didn't belong with min cues with 60% accuracy, increasing to 87% accuracy with min-mod cues.  Pt reworded illogical sentences heard aloud by changing key word with 80% accuracy.      Goal 1: Patient will tolerate regular with thin liquids with adequate mastication and oral clearance with no 
[facility]  Facility/Department: [facility] [place] [facility]  Speech Language Pathology   Treatment Note          Smooth Easley  1975  R254/R254-01  [x]   confirmed    Date: 7/3/2025      Restrictions/Precautions: Fall Risk    ADULT DIET; Regular    Respiratory Status: Room air  No active isolations    Rehab Diagnosis: CVA     Subjective:  Alert and Cooperative        Interventions used this date:  Cognitive Skill Development    Objective/Assessment:  Patient progressing towards goals:  Goal 1: Patient will be able to demonstrate appropriate diaphragmatic breathing for speech production at sentence level with stand by cues with 90% accuracy.  Goal 2: Patient will increase maximum phonation time from 5 seconds to > 10 seconds with stand by cues with 90% accuracy.  Goal 3: Patient will complete mid level visuospatial tasks related to reading with use of a visual strategy with stand by cues with 90% accuracy.  Followed 2 step written directions involving 4 components with 70% accuracy.  Pt corrected errors when prompted in 2/3x.  Goal 4: goal met  Goal 5: Patient will complete abstract verbal reasoning tasks (i.e. inferences, similarities/differences, sequencing) with stand by cues with 90% accuracy.  Completed deductive reasoning based on clues heard aloud with 100% accuracy.  Completed deductive puzzle with max cues fading to mod cues.  Decreased initiation.  Pt responded well to cues.        Treatment/Activity Tolerance:  Patient tolerated treatment well    Plan:  Continue per POC    Pain Assessment:  Patient c/o pain: Location and pain level: 6/10 left arm  Patient able to proceed with session.  Patient reported pain meds recently received.    Pain Re-assessment:  Patient c/o pain: Described as same as above    Patient/Caregiver Education:  Patient educated on session and progression towards goals.  Patient stated verbal understanding of directions.    Safety Devices:  All fall risk precautions in place      Speech 
[facility]  Facility/Department: [facility] [place] [facility]  Speech Language Pathology   Treatment Note      Smooth Easley  1975  R254/R254-01  [x]   confirmed      Date: 2025    Impaired mobility and activities of daily living [Z74.09, Z78.9]    Restrictions/Precautions: Fall Risk, Modified Diet    ADULT DIET; Dysphagia - Soft and Bite Sized; ok for breads     Respiratory Status:  Room air   No active isolations      Subjective:  Alert, Cooperative, Pleasant, and Motivated        Interventions used this date:  Therapeutic Meal Monitor and Instruction in Compensatory Strategies      Objective/Assessment:  Patient progressing towards goals:  Short Term Goals  Time Frame for Short Term Goals: 2 weeks  Goal 1: Patient will be able to demonstrate appropriate diaphragmatic breathing for speech production at sentence level with stand by cues with 90% accuracy.  Goal 2: Patient will increase maximum phonation time from 5 seconds to > 10 seconds with stand by cues with 90% accuracy.  Goal 3: Patient will complete mid level visuospatial tasks related to reading with use of a visual strategy with stand by cues with 90% accuracy.  Goal 4: goal met  Goal 5: Patient will complete abstract verbal reasoning tasks (i.e. inferences, similarities/differences, sequencing) with stand by cues with 90% accuracy.  Short-term Goals  Timeframe for Short-term Goals: 2-3 weeks  Goal 1: Patient will tolerate a soft and bite size diet (allow for breads) with thin liquids with adequate mastication and oral clearance with no overt s/s of difficulty or aspiration.  Goal 2: Patient will demonstrate recommended swallow strategies for safe and efficient swallow at independent level.  Goal 3: Patient will tolerate trials of regular with adequate mastication and oral clearance with no overt s/s of difficulty or aspiration.  Goal 4: Patient will complete oral motor ROM/strengthening/coordination exercises 10x/each in conjunction with NMES (e-stim) to 
[facility]   Facility/Department: [facility] [place] [facility]  Speech Language Pathology  Clinical Bedside Swallow Evaluation    NAME:Smooth Easley  : 1975 (49 y.o.)   [x]   confirmed    MRN: [id number]  [facility]  ADMISSION DATE: 2025  PATIENT DIAGNOSIS(ES): Impaired mobility and ADLs D/T R MCA Infarct  No chief complaint on file.    Patient Active Problem List    Diagnosis Date Noted    Cerebrovascular accident (CVA) (HCC) 2025    Liver mass, right lobe 2025    TIA (transient ischemic attack) 06/10/2025    Low back pain 2023     History reviewed. No pertinent past medical history.  Past Surgical History:   Procedure Laterality Date    CHOLECYSTECTOMY       Allergies   Allergen Reactions    Aspirin        Date of Onset: 2025  Rehab Diagnosis: CVA    Date of Evaluation: 6/15/2025   Evaluating Therapist: Rosa Elena Paiz SLP    Dysphagia Diagnosis  Dysphagia Diagnosis: Moderate to severe oral stage dysphagia;Moderate pharyngeal stage dysphagia  Dysphagia Impression : Patient presents with moderate oral dysphagia and suspected moderate pharyngeal dysphagia. Patient demonstrates left side facial droop resulting in pocketing of food on left side, impaired mastication of soft solids, intermittent cough following PO trials, and delayed initiation of swallow.    Recommended Diet  Recommendations: Dysphagia treatment;Modified barium swallow study  Diet Solids Recommendation: Pureed  Liquid Consistency Recommendation: Mildly Thick (Nectar)  Recommended Form of Meds: Meds in puree  Compensatory Swallowing Strategies : Upright as possible for all oral intake;Small bites/sips;Eat/Feed slowly;Swallow 2 times per bite/sip;Check for pocketing of food on the Left     Dysphagia Outcomes Severity Scale  Dysphagia Outcome Severity Scale: Level 3: Moderate dysphagia- Total assistance, supervision or strategies. Two or more diet consistencies restricted    Reason for Referral  Smooth Easley was referred 
[facility]  Facility/Department: [facility]  Speech Language Pathology   Treatment Note          Smooth Easley  1975  R261/R261-01  [x]   confirmed    Date: 2025      Restrictions/Precautions: Modified Diet, Swallowing - Thickened Liquids, Fall Risk     ADULT DIET; Dysphagia - Pureed; Mildly Thick (Nectar); no straws     Respiratory Status:     No active isolations    Rehab Diagnosis: CVA     Subjective:  Alert and Cooperative        Interventions used this date:  Dysphagia Treatment, Oral Motor Treatment, and Instruction in Compensatory Strategies    Objective/Assessment:  Patient progressing towards goals:  Short-term Goals  Timeframe for Short-term Goals: 2-3 weeks  Goal 1: Patient will tolerate a puree diet with mildly thick liquids with adequate oral clearance with no overt s/s of difficulty or aspiration in 10/10 trials.  Patient consumed ~3 oz of mildly thick liquids via side of cup. She was noted to independently take small sips and utilize chin tuck. She demonstrated no s/s of aspiration in all trials.    Patient trialed ~7 bites of puree via teaspoon. She independently demonstrated small bites, chin tuck, and liquid wash. No s/s of aspiration were noted.    Goal 2: Patient will demonstrate recommended swallow strategies for safe and efficient swallow at supervised level with 100% accuracy.  Patient independently labeled small bites and \"chew better.\" SLP provided re-education of swallow strategies. Patient verbalized understanding and repeated back.     Goal 3: Patient will complete labial/buccal/lingual ROM/strengthening/coordination exercises 10x/each with min cues, to promote safety and efficiency of oral phase of swallow.  Patient completed labial elevation, labial protrusion, and lingual lateralization. Patient continues to demonstrate left side facial weakness.     Goal 4: Patient will complete lingual press, lingual pull back, and bolus control exercises to improve bolus 
[facility]  Facility/Department: [facility]  Speech Language Pathology   Treatment Note          Smooth Easley  1975  R254/R254-01  [x]   confirmed    Date: 2025      Restrictions/Precautions: Fall Risk    ADULT DIET; Regular; ok for breads     Respiratory Status:  Room air  No active isolations    Rehab Diagnosis: CVA     Subjective:  Alert and Cooperative        Interventions used this date:  Cognitive Skill Development, Dysphagia Treatment, Oral Motor Treatment, and Estim/NMES    Objective/Assessment:  Patient progressing towards goals:  Goal 1: Patient will be able to demonstrate appropriate diaphragmatic breathing for speech production at sentence level with stand by cues with 90% accuracy.  Goal 2: Patient will increase maximum phonation time from 5 seconds to > 10 seconds with stand by cues with 90% accuracy.  Goal 3: Patient will complete mid level visuospatial tasks related to reading with use of a visual strategy with stand by cues with 90% accuracy.  Verbal cues needed x 3 during worksheet task.  Goal 4: goal met  Goal 5: Patient will complete abstract verbal reasoning tasks (i.e. inferences, similarities/differences, sequencing) with stand by cues with 90% accuracy.  Pt completed deduction puzzle from previous session with min-mod cues.  Pt requested to have deductive puzzles to complete in room.  Pt was provided with 6 to complete.  Pt initiated second puzzle independently completing first 2 items accurately.      Goal 1: Patient will tolerate regular with thin liquids with adequate mastication and oral clearance with no overt s/s of difficulty or aspiration.  Pt denied difficulty.  Pt is happy she gets cheeseburgers now each day.  Goal 2: Patient will demonstrate recommended swallow strategies for safe and efficient swallow at independent level.  Goal 3: goal met  Goal 4: Patient will complete oral motor ROM/strengthening/coordination exercises 10x/each in conjunction with NMES (e-stim) 
[facility]   [facility]  Speech Language Pathology  Treatment Note      Smooth Easley  1975  R261/R261-01  [x]   confirmed      Date: 2025    Impaired mobility and activities of daily living [Z74.09, Z78.9]    Restrictions/Precautions: Fall Risk, Modified Diet    ADULT DIET; Dysphagia - Minced and Moist; No Drinking Straws yes    Respiratory Status:  WFL  No active isolations    Subjective:  Alert, Cooperative, and Pleasant        Interventions used this date:  Speech Production and Cognitive Skill Development      Objective/Assessment:  Patient progressing towards goals:  Short Term Goals  Time Frame for Short Term Goals: 2 weeks  Goal 1: Patient will be able to demonstrate appropriate diaphragmatic breathing for speech production at sentence level with stand by cues with 90% accuracy.  Goal 2: Patient will increase maximum phonation time from 5 seconds to > 10 seconds with stand by cues with 90% accuracy.  Goal 3: Patient will complete mid level visuospatial tasks related to reading with use of a visual strategy with stand by cues with 90% accuracy.  Pt provided with visuospatial symbol cancellation task and initially instructed to turn head to left to acknowledge left side. Pt completed this task however missed the first 6 columns on left side. Educated patient on compensating for this deficit and provided it to complete with continued missing of left side of page.  Goal 4: Patient will maintain eye contact/visual attention to communication partner positioned on patient's left side with stand by cues.  Positioned self on left side of patient. Pt did attend to therapist and stated she was able to view the bathroom door (on her left side).   Goal 5: Patient will complete abstract verbal reasoning tasks (i.e. inferences, similarities/differences, sequencing) with stand by cues with 90% accuracy. Engaged patient in reading comprehension sequencing task with 5 steps for simple 
[facility]  Facility/Department: [facility] [place] [facility]  Speech Language Pathology   Treatment Note          Smooth Easley  1975  R254/R254-01  [x]   confirmed    Date: 2025      Restrictions/Precautions: Fall Risk    ADULT DIET; Regular    Respiratory Status: Room air  No active isolations    Rehab Diagnosis: CVA     Subjective:  Alert and Cooperative        Interventions used this date:  Cognitive Skill Development    Objective/Assessment:  Patient progressing towards goals:  Goal 1: Patient will be able to demonstrate appropriate diaphragmatic breathing for speech production at sentence level with stand by cues with 90% accuracy.  Goal 2: Patient will increase maximum phonation time from 5 seconds to > 10 seconds with stand by cues with 90% accuracy.  Goal 3: Patient will complete mid level visuospatial tasks related to reading with use of a visual strategy with stand by cues with 90% accuracy.  Visualspatial skills with looking at map on worksheet- completed with increased time and written cues placed for each direction (north, south, etc).  Min verbal cues throughout task to use finger to track left to right to locate written words on map.  Goal 4: goal met  Goal 5: Patient will complete abstract verbal reasoning tasks (i.e. inferences, similarities/differences, sequencing) with stand by cues with 90% accuracy.  Problem solving with map- pt answered written questions related to looking at a zoo map in 7/8 trials with min cues.        Treatment/Activity Tolerance:  Patient tolerated treatment well    Plan:  Continue per POC    Pain Assessment:  Patient does not c/o pain.    Pain Re-assessment:  Patient does not c/o pain.    Patient/Caregiver Education:  Patient educated on session and progression towards goals.  Patient stated verbal understanding of directions.    Safety Devices:  All fall risk precautions in place      Speech Therapy Level of Assistance Scale    PROBLEM SOLVING  Rating: Minimal 
[facility]  Facility/Department[place] [facility] [place] [facility]  Speech Language Pathology   Treatment Note          Smooth Easley  1975  R254/R254-01  [x]   confirmed    Date: 7/15/2025      Restrictions/Precautions: Fall Risk    ADULT DIET; Regular    Respiratory Status: Room air  No active isolations    Rehab Diagnosis: CVA     Subjective:  Alert, Cooperative, Pleasant, and Motivated        Interventions used this date:  Dysphagia Treatment, Oral Motor Treatment, Estim/NMES, and Voice Treatment    Objective/Assessment:  Patient progressing towards goals:  Goal 1: Patient will be able to demonstrate appropriate diaphragmatic breathing for speech production at sentence level with stand by cues with 90% accuracy.  Goal 2: Patient will increase maximum phonation time from 5 seconds to > 10 seconds with stand by cues with 90% accuracy.  Pt sustained vowels for average of 9 seconds x 5 trials.  Goal 3: Patient will complete mid level visuospatial tasks related to reading with use of a visual strategy with stand by cues with 90% accuracy.  Educated pt and daughter on visual strategies and HEP provided to patient with cross word puzzles and word searches.  Goal 4: goal met  Goal 5: Patient will complete abstract verbal reasoning tasks (i.e. inferences, similarities/differences, sequencing) with stand by cues with 90% accuracy.    Goal 1: Patient will complete oral motor ROM/strengthening/coordination exercises 10x/each in conjunction with NMES (e-stim) to left facial musculature (as able) to improve management of bolus during oral intake.  Patient received JASE/e-stim to the left facial musculature for 24 minutes, 30 Hz, 50 microseconds, 5 sec on/15 sec off duty cycle with the intensity of 9 while performing labial, facial, lingual, and buccal ROM/strengthening/coordination exercises 10x/each with stand by cues.  Pt followed along with HEP provided.  Much improved ROM and strength on left side compared to 1 week ago.  Pt 
[facility]   Facility/Department: [facility]  Speech Language Pathology    Smooth Easley  1975  R254/R254-01    Date: 7/4/2025      Speech Therapy attempted to see Smooth Easley for an unscheduled session.  Patient not seen due to:  Patient reported multiple emesis this am. Patient was leaving the floor for a KUB. ST will f/u with patient when able to.     Electronically signed by LUZ MARINA Reyes on 7/4/25 at 10:00 AM EDT   
[facility]  Facility/Department: [facility]  Speech Language Pathology   Treatment Note          Smooth Easley  1975  R261/R261-01  [x]   confirmed    Date: 2025      Restrictions/Precautions: Modified Diet, Fall Risk     ADULT DIET; Dysphagia - Minced and Moist; no straws     Respiratory Status:  Room air  No active isolations    Rehab Diagnosis: CVA     Subjective:  Alert and Cooperative        Interventions used this date:  Cognitive Skill Development, Dysphagia Treatment, Oral Motor Treatment, and Estim/NMES    Objective/Assessment:  Patient progressing towards goals:  Goal 1: Patient will complete oral motor drills paired with speech sounds to improve speech accuracy with 90% accuracy.  Goal 2: Patient will produce multi-syllabic words in sentences with 90% accuracy.  Goal 3: Patient will complete mid level visuospatial tasks related to reading with use of a visual strategy with stand by cues with 90% accuracy.  Goal 4: Patient will maintain eye contact/visual attention to communication partner positioned on patient's left side with stand by cues.  Pt read aloud room numbers on left side of hallway when pt being taken back to room by SLP when prompted with no errors.  Goal 5: Patient will complete abstract verbal reasoning tasks (i.e. inferences, similarities/differences, sequencing) with stand by cues with 90% accuracy.  Pt sequenced 3 words heard aloud in correct progression with 30% accuracy.  Accuracy improved with mod cues/repetition.    Goal 1: Patient will tolerate minced and moist and thin liquids with adequate mastication, oral clearance, and no s/s of aspiration in 10/10 trials.  Goal 2: Patient will demonstrate recommended swallow strategies for safe and efficient swallow at supervised level with 100% accuracy.  Independently used chin tuck with liquids and presented to right side of mouth.  Goal 3: Patient will complete labial/buccal/lingual ROM/strengthening/coordination exercises 
[facility]  Facility/Department: [facility]  Speech Language Pathology   Treatment Note          Smooth Easley  1975  R261/R261-01  [x]   confirmed    Date: 2025      Restrictions/Precautions: Fall Risk, Modified Diet     ADULT DIET; Dysphagia - Minced and Moist; No Drinking Straws     Respiratory Status:   RA  No active isolations    Rehab Diagnosis: CVA     Subjective:  Alert and Cooperative        Interventions used this date:  Dysphagia Treatment, Oral Motor Treatment, and Estim/NMES    Objective/Assessment:  Patient progressing towards goals:  Short-term Goals  Timeframe for Short-term Goals: 2-3 weeks  Goal 1: Patient will tolerate minced and moist and thin liquids with adequate mastication, oral clearance, and no s/s of aspiration in 10/10 trials.    Goal 2: Patient will demonstrate recommended swallow strategies for safe and efficient swallow at supervised level with 100% accuracy.    Goal 3: Patient will complete labial/buccal/lingual ROM/strengthening/coordination exercises 10x/each with min cues, to promote safety and efficiency of oral phase of swallow.  Patient received JASE/e-stim to the left facial musculature for 15 minutes, 30 Hz, 50 microseconds, 5 sec on/15 sec off duty cycle with the intensity of 8 while performing oral motor exercises: labial, facial, and buccal (10x/each) and lingual/labial exercises including lingual lateralization, lingual scrape and lingual protrusion with resistance. Patient presented with reduced ability to coordinate specific lingual movements with max verbal and visual cueing including lingual press and lingual pull back.       Goal 4: Patient will complete lingual press, lingual pull back, and bolus control exercises to improve bolus control/cohesion and manipulation with adequate effort and performance with min cues at 90% accuracy, in order to strengthen the muscles of the swallow and to safely handle the least restrictive diet level.    Goal 5: 
[facility]  Facility/Department: [facility]  Speech Language Pathology   Treatment Note          Smooth Easley  1975  R254/R254-01  [x]   confirmed    Date: 2025      Restrictions/Precautions: Fall Risk, Modified Diet     ADULT DIET; Dysphagia - Soft and Bite Sized; ok for breads     Respiratory Status:   Room air  No active isolations    Rehab Diagnosis: CVA     Subjective:  Alert and Cooperative     Daughter, Leona, observed session     Interventions used this date:  Dysphagia Treatment, Oral Motor Treatment, Estim/NMES, and Voice Treatment    Objective/Assessment:  Patient progressing towards goals:  Goal 1: Patient will be able to demonstrate appropriate diaphragmatic breathing for speech production at sentence level with stand by cues with 90% accuracy.  Performed diaphragmatic breathing with stand by cues x 5 trials.  Performed diaphragmatic breathing with vocal adduction with min cues.  Spontaneous speech consists of short sentences- maintains adequate phonation, however decreased inflection.  Goal 2: Patient will increase maximum phonation time from 5 seconds to > 10 seconds with stand by cues with 90% accuracy.  Sustained vowels for average duration of 6 seconds x 8 trials.  Goal 3: Patient will complete mid level visuospatial tasks related to reading with use of a visual strategy with stand by cues with 90% accuracy.  Goal 4: goal met  Goal 5: Patient will complete abstract verbal reasoning tasks (i.e. inferences, similarities/differences, sequencing) with stand by cues with 90% accuracy.    Goal 1: Patient will tolerate a soft and bite size diet (allow for breads) with thin liquids with adequate mastication and oral clearance with no overt s/s of difficulty or aspiration.  Goal 2: Patient will demonstrate recommended swallow strategies for safe and efficient swallow at independent level.  Goal 3: Patient will tolerate trials of regular with adequate mastication and oral clearance with no 
[facility]  Facility/Department: [facility] REHAB  Speech Language Pathology   Treatment Note          Smooth Easley  1975  R261/R261-01  [x]   confirmed    Date: 2025      Restrictions/Precautions: Fall Risk, Modified Diet     ADULT DIET; Dysphagia - Minced and Moist; no straws     Respiratory Status:   Room air  No active isolations    Rehab Diagnosis: CVA     Subjective:  Alert, Cooperative, and Motivated        Interventions used this date:  Cognitive Skill Development, Dysphagia Treatment, Oral Motor Treatment, Estim/NMES, and Voice Treatment    Objective/Assessment:  Patient progressing towards goals:  Goal 1: Patient will be able to demonstrate appropriate diaphragmatic breathing for speech production at sentence level with stand by cues with 90% accuracy.  Pt performed diaphragmatic breathing simultaneous with SLP then completing with min cues.  Cues to relax body.  Goal 2: Patient will increase maximum phonation time from 5 seconds to > 10 seconds with stand by cues with 90% accuracy.  Pt performed vocal adduction of vowels with use of diaphragmatic breath, averaging 6 seconds in duration.  Good breath support however strained but able to adjust at times with sustaining sound longer.      Goal 3: Patient will complete mid level visuospatial tasks related to reading with use of a visual strategy with stand by cues with 90% accuracy.  Goal 4: Patient will maintain eye contact/visual attention to communication partner positioned on patient's left side with stand by cues.  Goal 5: Patient will complete abstract verbal reasoning tasks (i.e. inferences, similarities/differences, sequencing) with stand by cues with 90% accuracy.  Pt explained 2 similarities and 2 differences between 2 pictured items with 81% accuracy, increasing to 100% accuracy with min cues.  Pt taking long pauses during task.    Goal 1: Patient will tolerate minced and moist and thin liquids with adequate mastication, oral clearance, 
[facility]  Facility/Department: [facility]  Speech Language Pathology   Treatment Note          Smooth Easley  1975  R261/R261-01  [x]   confirmed    Date: 2025      Restrictions/Precautions: Modified Diet, Fall Risk     ADULT DIET; Dysphagia - Minced and Moist; no straws     Respiratory Status:   Room air  No active isolations    Rehab Diagnosis: CVA     Subjective:  Alert, Cooperative, and Motivated        Interventions used this date:  Speech Production, Cognitive Skill Development, Dysphagia Treatment, and Estim/NMES    Objective/Assessment:  Patient progressing towards goals:  Goal 1: Patient will complete oral motor drills paired with speech sounds to improve speech accuracy with 90% accuracy.  Pt repeated rhyming one syllable word pairs with 100% speech accuracy.  D/C goal, goal met  Goal 2: Patient will produce multi-syllabic words in sentences with 90% accuracy.  Adequate speech production in sentences however noted strained vocal quality.  D/C goal  Advance speech goals to voice goals:  Patient will be able to demonstrate appropriate diaphragmatic breathing for speech production at sentence level with stand by cues with 90% accuracy.  Patient will increase maximum phonation time from 5 seconds to > 10 seconds with stand by cues with 90% accuracy.  Goal 3: Patient will complete mid level visuospatial tasks related to reading with use of a visual strategy with stand by cues with 90% accuracy.  Pt completed worksheet, marking letter of the correct category on line next to each word (3 categories).  Pt completed 2 columns with 15/16 correct.  Pt missed column on left side of page.  Once cued pt able to locate.  Red line placed on left side of page.  Pt then completed column with 7/7 correct.  Goal 4: Patient will maintain eye contact/visual attention to communication partner positioned on patient's left side with stand by cues.  Pt located correct number of blue lights on left side of hallway 
OCCUPATIONAL THERAPY  INPATIENT REHAB TREATMENT NOTE  [facility]      NAME: Smooth Easley  : 1975 (49 y.o.)  MRN: [id number]  CODE STATUS: Full Code  Room: R254/R254-01    Date of Service: 2025    Referring Physician: Dr Todd for Dr Ramirez  Rehab Diagnosis: Impaired mobility and ADL's due to R MCA infarct    Hospital course:   Comments: 49 y.o. female patient who presented to [facility] ER 6/10 with initial c/o chest tightness/ pressure and tingling to right side. Noted to be hypertensive. EKG showed ectopic atrial rhythm with HR 87,  no acute STEMI. Trops obtained and noted to be improved with each draw. While in ER, patient was noted to have left sided weakness, facial droop and confusion.Initial CT of head without acute findings. Additional work up confirmed   Lg non-hemorrhagic acute R MCA infarct in addition to adrenal and liver massess. Neurology, heme/onc and IR consulted.      Restrictions  Restrictions/Precautions  Restrictions/Precautions: Fall Risk, Modified Diet  Activity Level: Up as Tolerated, Up with Assist                 Patient's date of birth confirmed: Yes    SAFETY:  Safety Devices  Safety Devices in place: Yes  Type of devices: All fall risk precautions in place    SUBJECTIVE:  Subjective  Subjective: \" Yes I am.\"    Pain at start of treatment: No    Pain at end of treatment: No    COGNITION:  Orientation  Orientation Level: Unable to assess  Cognition  Overall Cognitive Status: Exceptions  Arousal/Alertness: Appears intact  Following Commands: Follows one step commands consistently  Attention Span: Appears intact  Memory: Decreased short term memory;Decreased recall of recent events  Safety Judgement: Decreased awareness of need for assistance;Decreased awareness of need for safety  Problem Solving: Assistance required to identify errors made;Assistance required to implement solutions;Assistance required to correct errors made;Assistance required to generate 
OCCUPATIONAL THERAPY  INPATIENT REHAB TREATMENT NOTE  [facility]      NAME: Smooth Easley  : 1975 (49 y.o.)  MRN: [id number]  CODE STATUS: Full Code  Room: R254/R254-01    Date of Service: 7/10/2025    Referring Physician: Dr Todd for Dr Ramirez  Rehab Diagnosis: Impaired mobility and ADL's due to R MCA infarct    Hospital course:   Comments: 49 y.o. female patient who presented to [facility] ER 6/10 with initial c/o chest tightness/ pressure and tingling to right side. Noted to be hypertensive. EKG showed ectopic atrial rhythm with HR 87,  no acute STEMI. Trops obtained and noted to be improved with each draw. While in ER, patient was noted to have left sided weakness, facial droop and confusion.Initial CT of head without acute findings. Additional work up confirmed   Lg non-hemorrhagic acute R MCA infarct in addition to adrenal and liver massess. Neurology, heme/onc and IR consulted.      Restrictions  Restrictions/Precautions  Restrictions/Precautions: Fall Risk, Modified Diet                 Patient's date of birth confirmed: Yes    SAFETY:  Safety Devices  Safety Devices in place: Yes  Type of devices: All fall risk precautions in place    SUBJECTIVE:  Subjective  Subjective: Patient reported that she slept the best she has since she has been in the hospital last night    Pain at start of treatment: No    Pain at end of treatment: No        COGNITION:     WFL    OBJECTIVE:     Patient was scheduled for family training with her daughter assisting with a shower. Daughter was unable to get a ride to the hospital on this date. Patient did complete shower ADL as stated below     Grooming/Oral Hygiene  Assistance Level: Set-up  Upper Extremity Bathing  Assistance Level: Supervision  Lower Extremity Bathing  Assistance Level: Minimal assistance  Skilled Clinical Factors: for rear chaparro cleansing while patient held onto the grab bar with her right hand  Upper Extremity Dressing  Assistance 
OCCUPATIONAL THERAPY  INPATIENT REHAB TREATMENT NOTE  [facility]      NAME: Smooth Easley  : 1975 (49 y.o.)  MRN: [id number]  CODE STATUS: Full Code  Room: R254/R254-01    Date of Service: 2025    Referring Physician: Dr Todd for Dr Ramirez  Rehab Diagnosis: Impaired mobility and ADL's due to R MCA infarct    Hospital course:   Comments: 49 y.o. female patient who presented to [facility] ER 6/10 with initial c/o chest tightness/ pressure and tingling to right side. Noted to be hypertensive. EKG showed ectopic atrial rhythm with HR 87,  no acute STEMI. Trops obtained and noted to be improved with each draw. While in ER, patient was noted to have left sided weakness, facial droop and confusion.Initial CT of head without acute findings. Additional work up confirmed   Lg non-hemorrhagic acute R MCA infarct in addition to adrenal and liver massess. Neurology, heme/onc and IR consulted.    Restrictions  Restrictions/Precautions  Restrictions/Precautions: Fall Risk, Modified Diet          Patient's date of birth confirmed: Yes    SAFETY:  Safety Devices  Type of devices: All fall risk precautions in place    SUBJECTIVE:   \"I'm tired\"    Pain at start of treatment: No    Pain at end of treatment: No    OBJECTIVE:     Sit to Stand  Assistance Level: Moderate assistance  Skilled Clinical Factors: STSs completed to scoot backward into w/c  Stand to Sit  Assistance Level: Moderate assistance    Arm tray adjusted to better fit w/c    While seated, completed RUE fine motor task with focus on coordination, sitting balance activity tolerance, and weight bearing in order to improve general function.    Challenged pt through usage of golf tees. Pt required to manipulate tees, through pinching/grasping, and placing according to instruction into pegboard. R UE reaches completed to various planes and directions.     LUE placed in therapists lap with pegboard placed on low table. Pt required to lean forward 
OCCUPATIONAL THERAPY  INPATIENT REHAB TREATMENT NOTE  [facility]      NAME: Smooth Easley  : 1975 (49 y.o.)  MRN: [id number]  CODE STATUS: Full Code  Room: R254/R254-01    Date of Service: 2025    Referring Physician: Dr Todd for Dr Ramirez  Rehab Diagnosis: Impaired mobility and ADL's due to R MCA infarct    Hospital course:   Comments: 49 y.o. female patient who presented to [facility] ER 6/10 with initial c/o chest tightness/ pressure and tingling to right side. Noted to be hypertensive. EKG showed ectopic atrial rhythm with HR 87,  no acute STEMI. Trops obtained and noted to be improved with each draw. While in ER, patient was noted to have left sided weakness, facial droop and confusion.Initial CT of head without acute findings. Additional work up confirmed   Lg non-hemorrhagic acute R MCA infarct in addition to adrenal and liver massess. Neurology, heme/onc and IR consulted.      Restrictions  Restrictions/Precautions  Restrictions/Precautions: Fall Risk, Modified Diet                 Patient's date of birth confirmed: Yes    SAFETY:  Safety Devices  Type of devices: All fall risk precautions in place    SUBJECTIVE:  Subjective  Subjective: \"I just want to change my clothes\"    Pain at start of treatment: Yes: 8/10    Pain at end of treatment: Yes: 6/10    Location: left hip and shoulder   Description: random  Nursing notified: Declined  Nurse: N/A  Intervention: Cold applied to the left hip for 15 minutes before hand off of care to the PTA    COGNITION:     WFL for the session    OBJECTIVE:         Upper Extremity Dressing  Assistance Level: Maximum assistance  Lower Extremity Dressing  Assistance Level: Maximum assistance  Putting On/Taking Off Footwear  Assistance Level: Dependent  Skilled Clinical Factors: daughter donned patient's socks and shoes to practice patient's care  Toileting  Assistance Level: Dependent  Skilled Clinical Factors: Patient was able to wipe her posterior 
OCCUPATIONAL THERAPY  INPATIENT REHAB TREATMENT NOTE  [facility]      NAME: Smooth Easley  : 1975 (49 y.o.)  MRN: [id number]  CODE STATUS: Full Code  Room: R254/R254-01    Date of Service: 2025    Referring Physician: Dr Todd for Dr Ramirez  Rehab Diagnosis: Impaired mobility and ADL's due to R MCA infarct    Hospital course:   Comments: 49 y.o. female patient who presented to [facility] ER 6/10 with initial c/o chest tightness/ pressure and tingling to right side. Noted to be hypertensive. EKG showed ectopic atrial rhythm with HR 87,  no acute STEMI. Trops obtained and noted to be improved with each draw. While in ER, patient was noted to have left sided weakness, facial droop and confusion.Initial CT of head without acute findings. Additional work up confirmed   Lg non-hemorrhagic acute R MCA infarct in addition to adrenal and liver massess. Neurology, heme/onc and IR consulted.      Restrictions  Restrictions/Precautions  Restrictions/Precautions: Fall Risk, Modified Diet  Activity Level: Up as Tolerated, Up with Assist      Patient's date of birth confirmed: Yes    SAFETY:  Safety Devices  Safety Devices in place: Yes  Type of devices: All fall risk precautions in place    SUBJECTIVE:  Subjective  Subjective: Pt stated that she wanted to work on her standing.    Pain at start of treatment: Yes: 9/10    Pain at end of treatment: Yes: 9/10    Location: \"My butt.\"  Nursing notified: Yes  Intervention: Other: Checking if pt can have pain medication.    COGNITION:  Orientation  Overall Orientation Status: Within Functional Limits  Orientation Level: Oriented to person;Oriented to place;Oriented to situation;Oriented to time  Cognition  Overall Cognitive Status: Exceptions  Arousal/Alertness: Appears intact  Following Commands: Follows one step commands with increased time;Follows one step commands with repetition  Attention Span: Appears intact  Memory: Decreased short term 
OCCUPATIONAL THERAPY  INPATIENT REHAB TREATMENT NOTE  [facility]      NAME: Smooth Easley  : 1975 (49 y.o.)  MRN: [id number]  CODE STATUS: Full Code  Room: R254/R254-01    Date of Service: 2025    Referring Physician: Dr Todd for Dr Ramirez  Rehab Diagnosis: Impaired mobility and ADL's due to R MCA infarct    Hospital course:   Comments: 49 y.o. female patient who presented to [facility] ER 6/10 with initial c/o chest tightness/ pressure and tingling to right side. Noted to be hypertensive. EKG showed ectopic atrial rhythm with HR 87,  no acute STEMI. Trops obtained and noted to be improved with each draw. While in ER, patient was noted to have left sided weakness, facial droop and confusion.Initial CT of head without acute findings. Additional work up confirmed   Lg non-hemorrhagic acute R MCA infarct in addition to adrenal and liver massess. Neurology, heme/onc and IR consulted.      Restrictions  Restrictions/Precautions  Restrictions/Precautions: Fall Risk, Modified Diet                 Patient's date of birth confirmed: Yes    SAFETY:  Safety Devices  Type of devices: All fall risk precautions in place    SUBJECTIVE:  Subjective  Subjective: \"I just want to change my clothes\"    Pain at start of treatment: No    Pain at end of treatment: No      COGNITION:     WFL for the session     OBJECTIVE:     Patient was seen for a partial ADL. Please see status below     Upper Extremity Dressing  Assistance Level: Maximum assistance  Lower Extremity Dressing  Assistance Level: Maximum assistance  Putting On/Taking Off Footwear  Assistance Level: Dependent  Skilled Clinical Factors: daughter donned patient's socks and shoes to practice patient's care  Toileting  Assistance Level: Dependent  Skilled Clinical Factors: Patient was able to wipe her posterior chaparro area while seated but needed assistance for anterior chaparro wiping and managing pants up and down  Toilet Transfers  Assistance Level: 
OCCUPATIONAL THERAPY  INPATIENT REHAB TREATMENT NOTE  [facility]      NAME: Smooth Easley  : 1975 (49 y.o.)  MRN: [id number]  CODE STATUS: Full Code  Room: R254/R254-01    Date of Service: 2025    Referring Physician: Dr Todd for Dr Ramirez  Rehab Diagnosis: Impaired mobility and ADL's due to R MCA infarct    Hospital course:   Comments: 49 y.o. female patient who presented to [facility] ER 6/10 with initial c/o chest tightness/ pressure and tingling to right side. Noted to be hypertensive. EKG showed ectopic atrial rhythm with HR 87,  no acute STEMI. Trops obtained and noted to be improved with each draw. While in ER, patient was noted to have left sided weakness, facial droop and confusion.Initial CT of head without acute findings. Additional work up confirmed   Lg non-hemorrhagic acute R MCA infarct in addition to adrenal and liver massess. Neurology, heme/onc and IR consulted.      Restrictions  Restrictions/Precautions  Restrictions/Precautions: Fall Risk                 Patient's date of birth confirmed: Yes    SAFETY:  Safety Devices  Safety Devices in place: Yes  Type of devices: All fall risk precautions in place    SUBJECTIVE:  Subjective  Subjective: \"I was so happy I could eat a cheeseburger last night. And french fries\" Patient reported when discussing her upgrade to regular diet.    Pain at start of treatment: Yes: 7/10    Pain at end of treatment: No    Location: left arm   Description: throb   Nursing notified: Not Applicable  Nurse: N/A  Intervention: Repositioned    COGNITION:     WFL for the session     OBJECTIVE:     Patient transferred from the wheelchair to the mat with a mod assist stand pivot transfer. While seated on the mat patient participated in weight bearing on the left arm while grasping a bean bag from the right and crossing midline with it before dropping it into a container on the floor. Patient's arm was adjusted to maximize weight bearing on extended 
OCCUPATIONAL THERAPY  INPATIENT REHAB TREATMENT NOTE  [facility]      NAME: Smooth Easley  : 1975 (49 y.o.)  MRN: [id number]  CODE STATUS: Full Code  Room: R254/R254-01    Date of Service: 2025    Referring Physician: Dr Todd for Dr Ramirez  Rehab Diagnosis: Impaired mobility and ADL's due to R MCA infarct    Hospital course:   Comments: 49 y.o. female patient who presented to [facility] ER 6/10 with initial c/o chest tightness/ pressure and tingling to right side. Noted to be hypertensive. EKG showed ectopic atrial rhythm with HR 87,  no acute STEMI. Trops obtained and noted to be improved with each draw. While in ER, patient was noted to have left sided weakness, facial droop and confusion.Initial CT of head without acute findings. Additional work up confirmed   Lg non-hemorrhagic acute R MCA infarct in addition to adrenal and liver massess. Neurology, heme/onc and IR consulted.      Restrictions  Restrictions/Precautions  Restrictions/Precautions: Fall Risk, Modified Diet  Activity Level: Up as Tolerated, Up with Assist     Patient's date of birth confirmed: Yes    SAFETY:  Safety Devices  Safety Devices in place: Yes  Type of devices: All fall risk precautions in place    SUBJECTIVE: \"I don't like it (splint). It gets caught on things.\"    Pain at start of treatment: Yes: 5/10    Pain at end of treatment: Yes: 2/10    Location: head  Description: ache  Nursing notified: Yes  Nurse: Carmen  Intervention: RN provided pain medication    COGNITION:  Orientation  Overall Orientation Status: Within Functional Limits  Cognition  Overall Cognitive Status: Exceptions  Arousal/Alertness: Appears intact  Following Commands: Follows one step commands consistently  Attention Span: Appears intact  Memory: Decreased short term memory;Decreased recall of recent events  Safety Judgement: Decreased awareness of need for assistance;Decreased awareness of need for safety  Problem Solving: Assistance 
OCCUPATIONAL THERAPY  INPATIENT REHAB TREATMENT NOTE  [facility]      NAME: Smooth Easley  : 1975 (49 y.o.)  MRN: [id number]  CODE STATUS: Full Code  Room: R254/R254-01    Date of Service: 2025    Referring Physician: Dr Todd for Dr Ramirez  Rehab Diagnosis: Impaired mobility and ADL's due to R MCA infarct    Hospital course:   Comments: 49 y.o. female patient who presented to [facility] ER 6/10 with initial c/o chest tightness/ pressure and tingling to right side. Noted to be hypertensive. EKG showed ectopic atrial rhythm with HR 87,  no acute STEMI. Trops obtained and noted to be improved with each draw. While in ER, patient was noted to have left sided weakness, facial droop and confusion.Initial CT of head without acute findings. Additional work up confirmed   Lg non-hemorrhagic acute R MCA infarct in addition to adrenal and liver massess. Neurology, heme/onc and IR consulted.      Restrictions  Restrictions/Precautions  Restrictions/Precautions: Fall Risk, Modified Diet  Activity Level: Up as Tolerated, Up with Assist                 Patient's date of birth confirmed: Yes    SAFETY:  Safety Devices  Safety Devices in place: Yes  Type of devices: All fall risk precautions in place    SUBJECTIVE:  Subjective  Subjective: Pt states she feels much better.    Pain at start of treatment: No    Pain at end of treatment: No    COGNITION:  Orientation  Overall Orientation Status: Within Functional Limits  Orientation Level: Oriented X4  Cognition  Overall Cognitive Status: Exceptions  Arousal/Alertness: Appears intact  Following Commands: Follows one step commands consistently  Attention Span: Appears intact  Memory: Decreased short term memory;Decreased recall of recent events  Safety Judgement: Decreased awareness of need for assistance;Decreased awareness of need for safety  Problem Solving: Assistance required to identify errors made;Assistance required to implement solutions;Assistance 
OCCUPATIONAL THERAPY  INPATIENT REHAB TREATMENT NOTE  [facility]      NAME: Smooth Easley  : 1975 (49 y.o.)  MRN: [id number]  CODE STATUS: Full Code  Room: R254/R254-01    Date of Service: 2025    Referring Physician: Dr Todd for Dr Ramirez  Rehab Diagnosis: Impaired mobility and ADL's due to R MCA infarct    Hospital course:   Comments: 49 y.o. female patient who presented to [facility] ER 6/10 with initial c/o chest tightness/ pressure and tingling to right side. Noted to be hypertensive. EKG showed ectopic atrial rhythm with HR 87,  no acute STEMI. Trops obtained and noted to be improved with each draw. While in ER, patient was noted to have left sided weakness, facial droop and confusion.Initial CT of head without acute findings. Additional work up confirmed   Lg non-hemorrhagic acute R MCA infarct in addition to adrenal and liver massess. Neurology, heme/onc and IR consulted.    Restrictions  Restrictions/Precautions  Restrictions/Precautions: Fall Risk, Modified Diet          Patient's date of birth confirmed: Yes    SAFETY:   All precautions in place    SUBJECTIVE:   \"Did you find your water bottle?\"    Pain at start of treatment: No    Pain at end of treatment: No    COGNITION:     Problem Solving: Supervision  Memory: Supervision    OBJECTIVE:    ADL- bed level. Pt then transferred to w/c with tremaine rojo       Grooming/Oral Hygiene  Assistance Level: Set-up  Skilled Clinical Factors: oral hygiene completed in bed with HOB raised. Setup only needed  Upper Extremity Bathing  Assistance Level: Maximum assistance  Skilled Clinical Factors: assist for thoroughness  Lower Extremity Bathing  Assistance Level: Dependent  Skilled Clinical Factors: bed level.  Upper Extremity Dressing  Assistance Level: Maximum assistance  Skilled Clinical Factors: assist threading L arm and then pulling down in the back  Lower Extremity Dressing  Assistance Level: Dependent;Requires x 2 
OCCUPATIONAL THERAPY  INPATIENT REHAB TREATMENT NOTE  [facility]      NAME: Smooth Easley  : 1975 (49 y.o.)  MRN: [id number]  CODE STATUS: Full Code  Room: R261/R261-01    Date of Service: 2025    Referring Physician: Dr Todd for Dr Ramirez  Rehab Diagnosis: Impaired mobility and ADL's due to R MCA infarct    Hospital course:   Comments: 49 y.o. female patient who presented to [facility] ER 6/10 with initial c/o chest tightness/ pressure and tingling to right side. Noted to be hypertensive. EKG showed ectopic atrial rhythm with HR 87,  no acute STEMI. Trops obtained and noted to be improved with each draw. While in ER, patient was noted to have left sided weakness, facial droop and confusion.Initial CT of head without acute findings. Additional work up confirmed   Lg non-hemorrhagic acute R MCA infarct in addition to adrenal and liver massess. Neurology, heme/onc and IR consulted.      Restrictions  Restrictions/Precautions  Restrictions/Precautions: Modified Diet, Fall Risk                 Patient's date of birth confirmed: Yes    SAFETY:  Safety Devices  Safety Devices in place: Yes  Type of devices: All fall risk precautions in place    SUBJECTIVE:  Subjective  Subjective: Patient reported that she was able to sleep with the splint on and it did not bother her    Pain at start of treatment: Yes: 2/10    Pain at end of treatment: Yes: 3/10    Location: soreness  Description: back  Nursing notified: Declined  Nurse: N/A  Intervention: Repositioned    COGNITION:     WFL for the session       OBJECTIVE:     Patient was seen for sponge bath ADL. Please see status below     Feeding  Assistance Level: Verbal cues;Supervision  Grooming/Oral Hygiene  Assistance Level: Supervision  Skilled Clinical Factors: hair care not completed  Upper Extremity Bathing  Assistance Level: Moderate assistance  Skilled Clinical Factors: seated in w/c at sink.  Lower Extremity Bathing  Assistance Level: 
OCCUPATIONAL THERAPY  INPATIENT REHAB TREATMENT NOTE  [facility]      NAME: Smooth Easley  : 1975 (49 y.o.)  MRN: [id number]  CODE STATUS: Full Code  Room: R254/R254-01    Date of Service: 2025    Referring Physician: Dr Todd for Dr Ramirez  Rehab Diagnosis: Impaired mobility and ADL's due to R MCA infarct    Hospital course:   Comments: 49 y.o. female patient who presented to [facility] ER 6/10 with initial c/o chest tightness/ pressure and tingling to right side. Noted to be hypertensive. EKG showed ectopic atrial rhythm with HR 87,  no acute STEMI. Trops obtained and noted to be improved with each draw. While in ER, patient was noted to have left sided weakness, facial droop and confusion.Initial CT of head without acute findings. Additional work up confirmed   Lg non-hemorrhagic acute R MCA infarct in addition to adrenal and liver massess. Neurology, heme/onc and IR consulted.      Restrictions  Restrictions/Precautions  Restrictions/Precautions: Fall Risk, Modified Diet  Activity Level: Up as Tolerated, Up with Assist        Patient's date of birth confirmed: Yes    SAFETY:  Safety Devices  Safety Devices in place: Yes  Type of devices: All fall risk precautions in place    SUBJECTIVE:  Subjective  Subjective: Pt stated that she had to use the bathroom.    Pain at start of treatment: No    Pain at end of treatment: No    COGNITION:  Orientation  Overall Orientation Status: Within Functional Limits  Orientation Level: Oriented to person;Oriented to situation  Cognition  Overall Cognitive Status: Exceptions  Arousal/Alertness: Appears intact  Following Commands: Follows one step commands with increased time;Follows one step commands with repetition  Attention Span: Appears intact  Memory: Decreased short term memory  Safety Judgement: Decreased awareness of need for assistance;Decreased awareness of need for safety  Problem Solving: Assistance required to identify errors 
OCCUPATIONAL THERAPY  INPATIENT REHAB TREATMENT NOTE  [facility]      NAME: Smooth Easley  : 1975 (49 y.o.)  MRN: [id number]  CODE STATUS: Full Code  Room: R254/R254-01    Date of Service: 7/10/2025    Referring Physician: Dr Todd for Dr Ramirez  Rehab Diagnosis: Impaired mobility and ADL's due to R MCA infarct    Hospital course:   Comments: 49 y.o. female patient who presented to [facility] ER 6/10 with initial c/o chest tightness/ pressure and tingling to right side. Noted to be hypertensive. EKG showed ectopic atrial rhythm with HR 87,  no acute STEMI. Trops obtained and noted to be improved with each draw. While in ER, patient was noted to have left sided weakness, facial droop and confusion.Initial CT of head without acute findings. Additional work up confirmed   Lg non-hemorrhagic acute R MCA infarct in addition to adrenal and liver massess. Neurology, heme/onc and IR consulted.      Restrictions  Restrictions/Precautions  Restrictions/Precautions: Modified Diet, Fall Risk  Activity Level: Up as Tolerated, Up with Assist        Patient's date of birth confirmed: Yes    SAFETY:  Safety Devices  Safety Devices in place: Yes  Type of devices: All fall risk precautions in place    SUBJECTIVE:  Subjective  Subjective: \"I just hope I don't drop a pill like that on the floor at home\"    Pain at start of treatment: No    Pain at end of treatment: No    COGNITION:  Orientation  Overall Orientation Status: Within Functional Limits  Orientation Level: Oriented X4  Cognition  Overall Cognitive Status: Exceptions  Arousal/Alertness: Appears intact  Following Commands: Follows one step commands consistently  Attention Span: Appears intact  Memory: Decreased short term memory;Decreased recall of recent events  Safety Judgement: Decreased awareness of need for assistance;Decreased awareness of need for safety  Problem Solving: Assistance required to identify errors made;Assistance required to 
OCCUPATIONAL THERAPY  INPATIENT REHAB TREATMENT NOTE  [facility]      NAME: Smooth Easley  : 1975 (49 y.o.)  MRN: [id number]  CODE STATUS: Full Code  Room: R254/R254-01    Date of Service: 7/3/2025    Referring Physician: Dr Todd for Dr Ramirez  Rehab Diagnosis: Impaired mobility and ADL's due to R MCA infarct    Hospital course:   Comments: 49 y.o. female patient who presented to [facility] ER 6/10 with initial c/o chest tightness/ pressure and tingling to right side. Noted to be hypertensive. EKG showed ectopic atrial rhythm with HR 87,  no acute STEMI. Trops obtained and noted to be improved with each draw. While in ER, patient was noted to have left sided weakness, facial droop and confusion.Initial CT of head without acute findings. Additional work up confirmed   Lg non-hemorrhagic acute R MCA infarct in addition to adrenal and liver massess. Neurology, heme/onc and IR consulted.      Restrictions  Restrictions/Precautions  Restrictions/Precautions: Fall Risk                 Patient's date of birth confirmed: Yes    SAFETY:  Safety Devices  Safety Devices in place: Yes  Type of devices: All fall risk precautions in place    SUBJECTIVE:  Subjective  Subjective: Patient reported that she loved the new bra that was purchased for her.    Pain at start of treatment: No    Pain at end of treatment: No    COGNITION:     Good for the session     Pt's current cognitive status is:  Comprehension: Mod I  Expression: Mod I  Social Interaction: Mod I  Problem Solving: Supervision  Memory: Mod I    OBJECTIVE:     Patient was seen for shower ADL. Please see status below    Grooming/Oral Hygiene  Skilled Clinical Factors: Patient was able to wash her hands and face and put product in her hair and brush it but required assistance to style hair to keep it out of her face  Upper Extremity Bathing  Assistance Level: Stand by assist  Skilled Clinical Factors: Patient was provided with a long handled poof 
OCCUPATIONAL THERAPY  INPATIENT REHAB TREATMENT NOTE  [facility]      NAME: Smooth Easley  : 1975 (49 y.o.)  MRN: [id number]  CODE STATUS: Full Code  Room: R254/R254-01    Date of Service: 7/15/2025    Referring Physician: Dr Todd for Dr Ramirez  Rehab Diagnosis: Impaired mobility and ADL's due to R MCA infarct    Hospital course:   Comments: 49 y.o. female patient who presented to [facility] ER 6/10 with initial c/o chest tightness/ pressure and tingling to right side. Noted to be hypertensive. EKG showed ectopic atrial rhythm with HR 87,  no acute STEMI. Trops obtained and noted to be improved with each draw. While in ER, patient was noted to have left sided weakness, facial droop and confusion.Initial CT of head without acute findings. Additional work up confirmed   Lg non-hemorrhagic acute R MCA infarct in addition to adrenal and liver massess. Neurology, heme/onc and IR consulted.      Restrictions  Restrictions/Precautions  Restrictions/Precautions: Fall Risk                 Patient's date of birth confirmed: Yes    SAFETY:  Safety Devices  Safety Devices in place: Yes  Type of devices: All fall risk precautions in place    SUBJECTIVE:   \"I've been really practicing\" patient reported about compensating for her left neglect     Pain at start of treatment: No    Pain at end of treatment: Yes: 2/10    Location: fingers  Description: sore  Nursing notified: No  Nurse: PTA was going to reach out to RN    COGNITION:     WFL      OBJECTIVE:     Patient transferred to the mat from the wheelchair with SPT with mod assist. Patient was able to maintain her sitting balance while reaching slightly out of midline to grasp bean bags from therapist and placing them into a container on her right side.  Patient also performed weight bearing on the left UE while repetitively reaching to the left with her right UE.     Patient was given cancel B task. Patient easily located all B's on the right and most 
OCCUPATIONAL THERAPY  INPATIENT REHAB TREATMENT NOTE  [facility]      NAME: Smooth Easley  : 1975 (49 y.o.)  MRN: [id number]  CODE STATUS: Full Code  Room: R254/R254-01    Date of Service: 2025    Referring Physician: Dr Todd for Dr Ramirez  Rehab Diagnosis: Impaired mobility and ADL's due to R MCA infarct    Hospital course:   Comments: 49 y.o. female patient who presented to [facility] ER 6/10 with initial c/o chest tightness/ pressure and tingling to right side. Noted to be hypertensive. EKG showed ectopic atrial rhythm with HR 87,  no acute STEMI. Trops obtained and noted to be improved with each draw. While in ER, patient was noted to have left sided weakness, facial droop and confusion.Initial CT of head without acute findings. Additional work up confirmed   Lg non-hemorrhagic acute R MCA infarct in addition to adrenal and liver massess. Neurology, heme/onc and IR consulted.      Restrictions  Restrictions/Precautions  Restrictions/Precautions: Fall Risk, Modified Diet                 Patient's date of birth confirmed: Yes    SAFETY:  Safety Devices  Safety Devices in place: Yes  Type of devices: All fall risk precautions in place    SUBJECTIVE:  Subjective  Subjective: Patient reported that her daughter will not be making it in today    Pain at start of treatment: No    Pain at end of treatment: No      COGNITION:  Orientation  Overall Orientation Status: Within Functional Limits      OBJECTIVE:      Putting On/Taking Off Footwear  Skilled Clinical Factors: Patient was able to doff and don the non skid sock using adaptive equipment. Patient only doffed and donned the right sock due to left foot not moving and will be more difficult. Patient had min difficulty because of the compression hose on the foot under the sock    Patient transferred with a mod assist transfer from the wheelchair to the mat. Therapist sat on the left side and held patient's arm in an extended position while 
OCCUPATIONAL THERAPY  INPATIENT REHAB TREATMENT NOTE  [facility]      NAME: Smooth Easley  : 1975 (49 y.o.)  MRN: [id number]  CODE STATUS: Full Code  Room: R254/R254-01    Date of Service: 2025    Referring Physician: Dr Todd for Dr Ramirez  Rehab Diagnosis: Impaired mobility and ADL's due to R MCA infarct    Hospital course:   Comments: 49 y.o. female patient who presented to [facility] ER 6/10 with initial c/o chest tightness/ pressure and tingling to right side. Noted to be hypertensive. EKG showed ectopic atrial rhythm with HR 87,  no acute STEMI. Trops obtained and noted to be improved with each draw. While in ER, patient was noted to have left sided weakness, facial droop and confusion.Initial CT of head without acute findings. Additional work up confirmed   Lg non-hemorrhagic acute R MCA infarct in addition to adrenal and liver massess. Neurology, heme/onc and IR consulted.    Restrictions  Restrictions/Precautions  Restrictions/Precautions: Fall Risk, Modified Diet       Patient's date of birth confirmed: Yes    SAFETY:   All precautions in place    SUBJECTIVE:     Pain at start of treatment: No    Pain at end of treatment: No  OBJECTIVE:     Sit to Stand  Assistance Level: Moderate assistance  Skilled Clinical Factors: x3 STSs completed to completely don shorts over hips.  Stand to Sit  Assistance Level: Moderate assistance    Estim- targeting LUE digit flexion/extension + shoulder subluxation protocol x 15 minutes. No adverse reactions noted. Pt with good tolerance and + reaction. Pt with excellent response all joints. Guided movements completed during estimo    Intensity: adjusted to setting 3-4 on NMES device  Duty duration:  On time 20 sec  Off time 20 sec  Frequency  55 Hz  Pulse width  200 uS    While seated at table, completed fine motor task with focus on coordination, activity tolerance, and visual scanning in order to improve general function.    Completed card 
OCCUPATIONAL THERAPY  INPATIENT REHAB TREATMENT NOTE  [facility]      NAME: Smooth Easley  : 1975 (49 y.o.)  MRN: [id number]  CODE STATUS: Full Code  Room: R254/R254-01    Date of Service: 2025    Referring Physician: Dr Todd for Dr Ramirez  Rehab Diagnosis: Impaired mobility and ADL's due to R MCA infarct    Hospital course:   Comments: 49 y.o. female patient who presented to [facility] ER 6/10 with initial c/o chest tightness/ pressure and tingling to right side. Noted to be hypertensive. EKG showed ectopic atrial rhythm with HR 87,  no acute STEMI. Trops obtained and noted to be improved with each draw. While in ER, patient was noted to have left sided weakness, facial droop and confusion.Initial CT of head without acute findings. Additional work up confirmed   Lg non-hemorrhagic acute R MCA infarct in addition to adrenal and liver massess. Neurology, heme/onc and IR consulted.      Restrictions  Restrictions/Precautions  Restrictions/Precautions: Fall Risk, Modified Diet                 Patient's date of birth confirmed: Yes    SAFETY:  Safety Devices  Safety Devices in place: Yes  Type of devices: All fall risk precautions in place    SUBJECTIVE:  Subjective  Subjective: \"I was tired\" patient reported when therapist mentioned that patient was in bed    Pain at start of treatment: No    Pain at end of treatment: No      COGNITION:   WFl    OBJECTIVE:     Patient completed two 3D cube design copying tasks. Patient made one small mistake both times but was noted to place the designs in a mirror image to what they looked like on the sample page. Patient was able to correct the easier one when she was educated she made it backwards but not the more complicated design     Supine to Sit  Assistance Level: Minimal assistance  Sit to Stand  Assistance Level: Minimal assistance  Stand to Sit  Assistance Level: Minimal assistance  Bed To/From Chair  Technique: Stand pivot  Assistance Level: 
OCCUPATIONAL THERAPY  INPATIENT REHAB TREATMENT NOTE  [facility]      NAME: Smooth Easley  : 1975 (49 y.o.)  MRN: [id number]  CODE STATUS: Full Code  Room: R261/R261-01    Date of Service: 2025    Referring Physician: Dr Todd for Dr Ramirez  Rehab Diagnosis: Impaired mobility and ADL's due to R MCA infarct    Hospital course:   Comments: 49 y.o. female patient who presented to [facility] ER 6/10 with initial c/o chest tightness/ pressure and tingling to right side. Noted to be hypertensive. EKG showed ectopic atrial rhythm with HR 87,  no acute STEMI. Trops obtained and noted to be improved with each draw. While in ER, patient was noted to have left sided weakness, facial droop and confusion.Initial CT of head without acute findings. Additional work up confirmed   Lg non-hemorrhagic acute R MCA infarct in addition to adrenal and liver massess. Neurology, heme/onc and IR consulted.      Restrictions  Restrictions/Precautions  Restrictions/Precautions: Modified Diet, Fall Risk                 Patient's date of birth confirmed: Yes    SAFETY:  Safety Devices  Safety Devices in place: Yes  Type of devices: All fall risk precautions in place    SUBJECTIVE:   Patient reported she slept well last night     Pain at start of treatment: No    Pain at end of treatment: No      COGNITION:     Good for the session    OBJECTIVE:     Patient was not scheduled for an ADL on this date but was not washed or dressed so an ADL was completed     Grooming/Oral Hygiene  Assistance Level: Minimal assistance  Skilled Clinical Factors: min care for brushing her hair and putting it in a pony tail  Upper Extremity Bathing  Assistance Level: Maximum assistance  Skilled Clinical Factors: seated on the side of the bed with min assist for sitting balance  Lower Extremity Bathing  Assistance Level: Dependent  Skilled Clinical Factors: completed at bed level to allow for thoroughness  Upper Extremity 
OCCUPATIONAL THERAPY  INPATIENT REHAB TREATMENT NOTE  [facility]      NAME: Smooth Easley  : 1975 (49 y.o.)  MRN: [id number]  CODE STATUS: Full Code  Room: R254/R254-01    Date of Service: 2025    Referring Physician: Dr Todd for Dr Ramirez  Rehab Diagnosis: Impaired mobility and ADL's due to R MCA infarct    Hospital course:   Comments: 49 y.o. female patient who presented to [facility] ER 6/10 with initial c/o chest tightness/ pressure and tingling to right side. Noted to be hypertensive. EKG showed ectopic atrial rhythm with HR 87,  no acute STEMI. Trops obtained and noted to be improved with each draw. While in ER, patient was noted to have left sided weakness, facial droop and confusion.Initial CT of head without acute findings. Additional work up confirmed   Lg non-hemorrhagic acute R MCA infarct in addition to adrenal and liver massess. Neurology, heme/onc and IR consulted.      Restrictions  Restrictions/Precautions  Restrictions/Precautions: Fall Risk, Modified Diet                 Patient's date of birth confirmed: Yes    SAFETY:  Safety Devices  Safety Devices in place: Yes  Type of devices: All fall risk precautions in place    SUBJECTIVE:  Subjective  Subjective: \"It catches on things when I am sleeping\" patient reported about the splint    Pain at start of treatment: No    Pain at end of treatment: Yes: 3/10    Location: left wrist and fingers  Description: increased with movement   Nursing notified: Declined  Nurse: N/A  Intervention: Other: UE was supported     COGNITION:   WFL    OBJECTIVE:     Patient was seen for shower ADL. Please see status below     Feeding  Assistance Level: Set-up  Grooming/Oral Hygiene  Assistance Level: Set-up  Upper Extremity Bathing  Assistance Level: Supervision  Lower Extremity Bathing  Assistance Level: Minimal assistance  Skilled Clinical Factors: for rear chaparro cleansing while patient held onto the grab bar with her right hand  Upper 
OCCUPATIONAL THERAPY  INPATIENT REHAB TREATMENT NOTE  [facility]      NAME: Smooth Easley  : 1975 (49 y.o.)  MRN: [id number]  CODE STATUS: Full Code  Room: R261/R261-01    Date of Service: 2025    Referring Physician: Dr Todd for Dr Ramirez  Rehab Diagnosis: Impaired mobility and ADL's due to R MCA infarct    Hospital course:   Comments: 49 y.o. female patient who presented to [facility] ER 6/10 with initial c/o chest tightness/ pressure and tingling to right side. Noted to be hypertensive. EKG showed ectopic atrial rhythm with HR 87,  no acute STEMI. Trops obtained and noted to be improved with each draw. While in ER, patient was noted to have left sided weakness, facial droop and confusion.Initial CT of head without acute findings. Additional work up confirmed   Lg non-hemorrhagic acute R MCA infarct in addition to adrenal and liver massess. Neurology, heme/onc and IR consulted.      Restrictions  Restrictions/Precautions  Restrictions/Precautions: Modified Diet, Fall Risk  Activity Level: Up as Tolerated                 Patient's date of birth confirmed: Yes    SAFETY:  Safety Devices  Safety Devices in place: Yes  Type of devices: All fall risk precautions in place    SUBJECTIVE: \"My daughters are momma girls.\"       Pain at start of treatment: Yes: 9/10    Pain at end of treatment: Yes: 9/10    Location: lower back  Description: throbbing   Nursing notified: Declined  Intervention: Cold applied during OT intervention     COGNITION:  Orientation  Overall Orientation Status: Within Functional Limits  Orientation Level: Oriented to person;Oriented to place;Oriented to situation;Oriented to time  Cognition  Overall Cognitive Status: Exceptions  Arousal/Alertness: Appears intact  Following Commands: Follows one step commands with increased time;Follows one step commands with repetition  Attention Span: Appears intact  Insights: Decreased awareness of deficits  Initiation: Requires cues 
OCCUPATIONAL THERAPY  INPATIENT REHAB TREATMENT NOTE  [facility]      NAME: Smooth Easley  : 1975 (49 y.o.)  MRN: [id number]  CODE STATUS: Full Code  Room: R261/R261-01    Date of Service: 2025    Referring Physician: Dr Todd for Dr Ramirez  Rehab Diagnosis: Impaired mobility and ADL's due to R MCA infarct    Hospital course:   Comments: 49 y.o. female patient who presented to [facility] ER 6/10 with initial c/o chest tightness/ pressure and tingling to right side. Noted to be hypertensive. EKG showed ectopic atrial rhythm with HR 87,  no acute STEMI. Trops obtained and noted to be improved with each draw. While in ER, patient was noted to have left sided weakness, facial droop and confusion.Initial CT of head without acute findings. Additional work up confirmed   Lg non-hemorrhagic acute R MCA infarct in addition to adrenal and liver massess. Neurology, heme/onc and IR consulted.      Restrictions  Restrictions/Precautions  Restrictions/Precautions: Modified Diet, Fall Risk                 Patient's date of birth confirmed: Yes    SAFETY:  Safety Devices  Safety Devices in place: Yes  Type of devices: All fall risk precautions in place    SUBJECTIVE:  Subjective  Subjective: \"It hurts when I lean forward\"    Pain at start of treatment: No    Pain at end of treatment: No    Patient reported no pain at the beginning and end of the session but some when she leans anterior.  Identified closeness of the wheelchair and obtained a different wheelchair with increased width    COGNITION:     WFL for the session     OBJECTIVE:      Feeding  Assistance Level: Supervision  Skilled Clinical Factors: verbal cues to clear the left side of her mouth twice. Other times patient initiated it on her own and performed finger sweep. Patient was observed performing the recommended chin tuck each time she drank  Toileting  Assistance Level: Dependent;Requires x 2 assistance  Toilet Transfers  Additional 
OCCUPATIONAL THERAPY  INPATIENT REHAB TREATMENT NOTE  [facility]      NAME: Smooth Easley  : 1975 (50 y.o.)  MRN: [id number]  CODE STATUS: Full Code  Room: R254/R254-01    Date of Service: 2025    Referring Physician: Dr Todd for Dr Ramirez  Rehab Diagnosis: Impaired mobility and ADL's due to R MCA infarct    Hospital course:   Comments: 49 y.o. female patient who presented to [facility] ER 6/10 with initial c/o chest tightness/ pressure and tingling to right side. Noted to be hypertensive. EKG showed ectopic atrial rhythm with HR 87,  no acute STEMI. Trops obtained and noted to be improved with each draw. While in ER, patient was noted to have left sided weakness, facial droop and confusion.Initial CT of head without acute findings. Additional work up confirmed   Lg non-hemorrhagic acute R MCA infarct in addition to adrenal and liver massess. Neurology, heme/onc and IR consulted.      Restrictions  Restrictions/Precautions  Restrictions/Precautions: Fall Risk                 Patient's date of birth confirmed: Yes    SAFETY:   All precautions in place    SUBJECTIVE:   I am going home about 6:00    Pain at start of treatment: No    Pain at end of treatment: No      OBJECTIVE:     PROM was performed throughout the left UE with tone reduction techniques used. Patient reported some pain in the wrist and fingers with extension           PLAN OF CARE:  Balance training, Functional mobility training, Strengthening, Endurance training, Neuromuscular re-education, Cognitive reorientation, Pain management, Safety education & training, Equipment evaluation, education, & procurement, Patient/Caregiver education & training, Self-Care / ADL, Home management training, Cognitive/Perceptual training, Coordination training, Sensory integration, ROM  Continue OT POC until discharge with plan of care to concentrate on family training and home going education    Patient goals : \"To get better to go home.\" 
OCCUPATIONAL THERAPY  INPATIENT REHAB TREATMENT NOTE  [facility]      NAME: Smooth Easley  : 1975 (49 y.o.)  MRN: [id number]  CODE STATUS: Full Code  Room: R261/R261-01    Date of Service: 2025    Referring Physician: Dr Todd for Dr Ramirez  Rehab Diagnosis: Impaired mobility and ADL's due to R MCA infarct    Hospital course:   Comments: 49 y.o. female patient who presented to [facility] ER 6/10 with initial c/o chest tightness/ pressure and tingling to right side. Noted to be hypertensive. EKG showed ectopic atrial rhythm with HR 87,  no acute STEMI. Trops obtained and noted to be improved with each draw. While in ER, patient was noted to have left sided weakness, facial droop and confusion.Initial CT of head without acute findings. Additional work up confirmed   Lg non-hemorrhagic acute R MCA infarct in addition to adrenal and liver massess. Neurology, heme/onc and IR consulted.      Restrictions  Restrictions/Precautions  Restrictions/Precautions: Modified Diet, Fall Risk                 Patient's date of birth confirmed: Yes    SAFETY:  Safety Devices  Safety Devices in place: Yes  Type of devices: All fall risk precautions in place    SUBJECTIVE:   \"Left' Patient stated when therapist asked where she thought the letters she was looking for might be    Pain at start of treatment: No    Pain at end of treatment: No    COGNITION:     Good for the session    OBJECTIVE:    Patient transferred from the wheelchair to the therapy mat using a sliding board with a mod assist transfer. Once seated on the edge of the mat in unsupported sitting patient was able to sit with SBA to mod assist depending on attention and balance.   Weight bearing was performed on extended arm with patient reaching to the mat next to her to  rings one at a time from the right side and place them onto a post on the left side. Patient then transitioned to picking up clothespins and placing them onto a yardstick 
OCCUPATIONAL THERAPY  INPATIENT REHAB TREATMENT NOTE  [facility]      NAME: Smooth Easley  : 1975 (49 y.o.)  MRN: [id number]  CODE STATUS: Full Code  Room: R254/R254-01    Date of Service: 2025    Referring Physician: Dr Todd for Dr Ramirez  Rehab Diagnosis: Impaired mobility and ADL's due to R MCA infarct    Hospital course:   Comments: 49 y.o. female patient who presented to [facility] ER 6/10 with initial c/o chest tightness/ pressure and tingling to right side. Noted to be hypertensive. EKG showed ectopic atrial rhythm with HR 87,  no acute STEMI. Trops obtained and noted to be improved with each draw. While in ER, patient was noted to have left sided weakness, facial droop and confusion.Initial CT of head without acute findings. Additional work up confirmed   Lg non-hemorrhagic acute R MCA infarct in addition to adrenal and liver massess. Neurology, heme/onc and IR consulted.    Restrictions  Restrictions/Precautions  Restrictions/Precautions: Fall Risk, Modified Diet       Patient's date of birth confirmed: Yes    SAFETY:   All precautions in place    SUBJECTIVE:     Pain at start of treatment: No    Pain at end of treatment: No    OBJECTIVE:    Patient engaged in fine motor and visual perceptual / cognitive activity to increase [place] with ADL/IADL completion.    Challenged pt to assemble small peg board into pattern according to a visual model presented on a piece of paper. Pt completed 1 pattern. Completed with less difficult symmetrical design.   Pt also challenged with following color pattern for the models.   Pt overall with fair accuracy. 1 line of pegs noted to not be completed on the far left side of the board and pt was able to correct once cued    Estim    Targeted LUE digit flexion/extension + shoulder subluxation protocol x 15 minutes. No adverse reactions noted. Pt with good tolerance and + reaction. Pt with good response. Guided movements completed during 
OCCUPATIONAL THERAPY  INPATIENT REHAB TREATMENT NOTE  [facility]      NAME: Smooth Easley  : 1975 (49 y.o.)  MRN: [id number]  CODE STATUS: Full Code  Room: R254/R254-01    Date of Service: 2025    Referring Physician: Dr Todd for Dr Ramirez  Rehab Diagnosis: Impaired mobility and ADL's due to R MCA infarct    Hospital course:   Comments: 49 y.o. female patient who presented to [facility] ER 6/10 with initial c/o chest tightness/ pressure and tingling to right side. Noted to be hypertensive. EKG showed ectopic atrial rhythm with HR 87,  no acute STEMI. Trops obtained and noted to be improved with each draw. While in ER, patient was noted to have left sided weakness, facial droop and confusion.Initial CT of head without acute findings. Additional work up confirmed   Lg non-hemorrhagic acute R MCA infarct in addition to adrenal and liver massess. Neurology, heme/onc and IR consulted.    Restrictions  Restrictions/Precautions  Restrictions/Precautions: Fall Risk, Modified Diet       Patient's date of birth confirmed: Yes    SAFETY:     All precautions in place    SUBJECTIVE:     Pain at start of treatment: Yes: 1/10    Pain at end of treatment: Yes: 1/10    Location: lower back  Description: ache  Nursing notified: Declined    OBJECTIVE:    ADL- toileting completed two times. Pt incontinent of urine upon session. Pt then able to later voice that she needed to use the restroom at the of of the session     Toileting  Assistance Level: Dependent;Requires x 2 assistance  Skilled Clinical Factors: 1 person for balance, 1 person for task management     Toilet transfer: MaxA needed to pivot to/from toilet     Sit to Stand- to bathroom grab bar   Assistance Level: Moderate assistance  Skilled Clinical Factors: x4 STSs     Stand to Sit  Assistance Level: Moderate assistance    Patient engaged in seated fine motor and visual perceptual activities to increase [place] with ADL/IADL 
OCCUPATIONAL THERAPY  INPATIENT REHAB TREATMENT NOTE  [facility]      NAME: Smooth Easley  : 1975 (49 y.o.)  MRN: [id number]  CODE STATUS: Full Code  Room: R254/R254-01    Date of Service: 2025    Referring Physician: Dr Todd for Dr Ramirez  Rehab Diagnosis: Impaired mobility and ADL's due to R MCA infarct    Hospital course:   Comments: 49 y.o. female patient who presented to [facility] ER 6/10 with initial c/o chest tightness/ pressure and tingling to right side. Noted to be hypertensive. EKG showed ectopic atrial rhythm with HR 87,  no acute STEMI. Trops obtained and noted to be improved with each draw. While in ER, patient was noted to have left sided weakness, facial droop and confusion.Initial CT of head without acute findings. Additional work up confirmed   Lg non-hemorrhagic acute R MCA infarct in addition to adrenal and liver massess. Neurology, heme/onc and IR consulted.    Restrictions  Restrictions/Precautions  Restrictions/Precautions: Fall Risk, Modified Diet          Patient's date of birth confirmed: Yes    SAFETY:   All precautions in place    SUBJECTIVE:     Pain at start of treatment: Yes: 7/10    Pain at end of treatment: Yes: 7/10    Location: L wrist to shoulder   Description: ache  Nursing notified: Declined  Nurse aware per pt     OBJECTIVE:    Patient engaged in seated visual perceptual activity to increase [place] with ADL/IADL completion.    Pt required to assemble different parquetry shapes (trapezoid, square, triangle, devonte, etc- each being a different color) and sizes into various 2D structures/creations according to a visual model presented on a piece of paper.     Pt completed butterfly pattern.   Started with less difficult symmetrical design. Pt with good ability to complete the R side of the butterfly accurately. L side proved very difficult for patient with several cues needed to fully complete pattern and to correct multiple errors.     While 
OCCUPATIONAL THERAPY  INPATIENT REHAB TREATMENT NOTE  [facility]      NAME: Smooth Easley  : 1975 (49 y.o.)  MRN: [id number]  CODE STATUS: Full Code  Room: R254/R254-01    Date of Service: 2025    Referring Physician: Dr Todd for Dr Ramirez  Rehab Diagnosis: Impaired mobility and ADL's due to R MCA infarct    Hospital course:   Comments: 49 y.o. female patient who presented to [facility] ER 6/10 with initial c/o chest tightness/ pressure and tingling to right side. Noted to be hypertensive. EKG showed ectopic atrial rhythm with HR 87,  no acute STEMI. Trops obtained and noted to be improved with each draw. While in ER, patient was noted to have left sided weakness, facial droop and confusion.Initial CT of head without acute findings. Additional work up confirmed   Lg non-hemorrhagic acute R MCA infarct in addition to adrenal and liver massess. Neurology, heme/onc and IR consulted.      Restrictions  Restrictions/Precautions  Restrictions/Precautions: Fall Risk, Modified Diet                 Patient's date of birth confirmed: Yes    SAFETY:  Safety Devices  Safety Devices in place: Yes  Type of devices: All fall risk precautions in place    SUBJECTIVE:  Subjective  Subjective: Patient reported feeling as though her wrist pain is decreased this pm during extension    Pain at start of treatment: No    Pain at end of treatment: No      COGNITION:     WFL for this session    OBJECTIVE:     Patient transferred from the wheelchair to the mat with min assist transfer to the right. Patient sat edge of mat with no assistance but with supervision. Patient completed sitting on the edge of the mat and placed 100 pop beads onto the peg board using her right hand while she was maintaining her balance.     Patient then performed reaching to where therapist was holding the pop bead and she used fine motor  to grasp them and then she placed it in a container on the left while performing rotation of 
OCCUPATIONAL THERAPY  INPATIENT REHAB TREATMENT NOTE  [facility]      NAME: Smooth Easley  : 1975 (49 y.o.)  MRN: [id number]  CODE STATUS: Full Code  Room: R254/R254-01    Date of Service: 2025    Referring Physician: Dr Todd for Dr Ramirez  Rehab Diagnosis: Impaired mobility and ADL's due to R MCA infarct    Hospital course:   Comments: 49 y.o. female patient who presented to [facility] ER 6/10 with initial c/o chest tightness/ pressure and tingling to right side. Noted to be hypertensive. EKG showed ectopic atrial rhythm with HR 87,  no acute STEMI. Trops obtained and noted to be improved with each draw. While in ER, patient was noted to have left sided weakness, facial droop and confusion.Initial CT of head without acute findings. Additional work up confirmed   Lg non-hemorrhagic acute R MCA infarct in addition to adrenal and liver massess. Neurology, heme/onc and IR consulted.      Restrictions  Restrictions/Precautions  Restrictions/Precautions: Fall Risk                 Patient's date of birth confirmed: Yes    SAFETY:  Safety Devices  Safety Devices in place: Yes  Type of devices: All fall risk precautions in place    SUBJECTIVE:  Subjective  Subjective: \"I am trying\" patient reported when she was asked to move the left UE. Patient was assured that therapist knew she was trying but that we were attempting to encourage the brain to send the correct impulses to the arm    Pain at start of treatment: No    Pain at end of treatment: No      COGNITION:  Orientation  Overall Orientation Status: Within Functional Limits    OBJECTIVE:     Patient transferred from the wheelchair to the mat with mod assist transfer to the right. Patient was able to maintain sitting balance in midline with supervision only. A wheelchair table was placed in front of patient with her arms supported. Patient completed weight bearing on the left arm both on extended arm and on her elbow while reaching across 
OCCUPATIONAL THERAPY  INPATIENT REHAB TREATMENT NOTE  [facility]      NAME: Smooth Easley  : 1975 (49 y.o.)  MRN: [id number]  CODE STATUS: Full Code  Room: R261/R261-01    Date of Service: 2025    Referring Physician: Dr Todd for Dr Ramirez  Rehab Diagnosis: Impaired mobility and ADL's due to R MCA infarct    Hospital course:   Comments: 49 y.o. female patient who presented to [facility] ER 6/10 with initial c/o chest tightness/ pressure and tingling to right side. Noted to be hypertensive. EKG showed ectopic atrial rhythm with HR 87,  no acute STEMI. Trops obtained and noted to be improved with each draw. While in ER, patient was noted to have left sided weakness, facial droop and confusion.Initial CT of head without acute findings. Additional work up confirmed   Lg non-hemorrhagic acute R MCA infarct in addition to adrenal and liver massess. Neurology, heme/onc and IR consulted.    Restrictions  Restrictions/Precautions  Restrictions/Precautions: Fall Risk, Modified Diet          Patient's date of birth confirmed: Yes    SAFETY:  Safety Devices  Type of devices: All fall risk precautions in place    SUBJECTIVE:     Pain at start of treatment: Yes: 6/10    Pain at end of treatment: Yes: 6/10    Location: headache  Description: ache  Nursing notified: Declined  Prev medicated    COGNITION:     Problem Solving: Mod A  Memory: Supervision    OBJECTIVE:    ADL     Grooming/Oral Hygiene  Assistance Level: Set-up;Moderate assistance;Increased time to complete  Skilled Clinical Factors: setup for oral hygiene. Assist needed for hair care and doning lorena  Upper Extremity Bathing  Assistance Level: Maximum assistance  Skilled Clinical Factors: assist needed for siting balance and thoroughness  Lower Extremity Bathing  Assistance Level: Dependent;Requires x 2 assistance  Skilled Clinical Factors: assist needed for reaching distal legs and x2 for any standing. Consistent L side lean  Upper 
OCCUPATIONAL THERAPY  INPATIENT REHAB TREATMENT NOTE  [facility]      NAME: Smooth Easley  : 1975 (49 y.o.)  MRN: [id number]  CODE STATUS: Full Code  Room: R261/R261-01    Date of Service: 2025    Referring Physician: Dr Todd for Dr Ramirez  Rehab Diagnosis: Impaired mobility and ADL's due to R MCA infarct    Hospital course:   Comments: 49 y.o. female patient who presented to [facility] ER 6/10 with initial c/o chest tightness/ pressure and tingling to right side. Noted to be hypertensive. EKG showed ectopic atrial rhythm with HR 87,  no acute STEMI. Trops obtained and noted to be improved with each draw. While in ER, patient was noted to have left sided weakness, facial droop and confusion.Initial CT of head without acute findings. Additional work up confirmed   Lg non-hemorrhagic acute R MCA infarct in addition to adrenal and liver massess. Neurology, heme/onc and IR consulted.      Restrictions  Restrictions/Precautions  Restrictions/Precautions: Fall Risk, Modified Diet (Minced and moist, no straws)       Patient's date of birth confirmed: Yes    SAFETY:  Safety Devices  Safety Devices in place: Yes  Type of devices: All fall risk precautions in place    SUBJECTIVE: \"I work at [employer] ([employer]).\"     Pain   Pain at start of treatment: No    Pain at end of treatment: No      OBJECTIVE:    Dynamic Sitting/Visual Perception  Pt participated in dynamic sitting activity with visual perception challenges incorporated. Large board was placed on a low table at midline in front of pt who was sitting in WC with feet on the floor. Pt used her right hand to retrieve large pegs that were placed on low stools to the right and left of pt. Pt then inserted/removed 100 pegs with increased time and effort. Pt required verbal and visual cues to attend to top left corner of board. Mirror was placed in front of pt to provide visual feedback of posture. Pt was able to sit with an upright posture with rest 
OCCUPATIONAL THERAPY  INPATIENT REHAB TREATMENT NOTE  [facility]      NAME: Smooth Easley  : 1975 (49 y.o.)  MRN: [id number]  CODE STATUS: Full Code  Room: R254/R254-01    Date of Service: 2025    Referring Physician: Dr Todd for Dr Ramirez  Rehab Diagnosis: Impaired mobility and ADL's due to R MCA infarct    Hospital course:   Comments: 49 y.o. female patient who presented to [facility] ER 6/10 with initial c/o chest tightness/ pressure and tingling to right side. Noted to be hypertensive. EKG showed ectopic atrial rhythm with HR 87,  no acute STEMI. Trops obtained and noted to be improved with each draw. While in ER, patient was noted to have left sided weakness, facial droop and confusion.Initial CT of head without acute findings. Additional work up confirmed   Lg non-hemorrhagic acute R MCA infarct in addition to adrenal and liver massess. Neurology, heme/onc and IR consulted.      Restrictions  Restrictions/Precautions  Restrictions/Precautions: Fall Risk, Modified Diet  Activity Level: Up as Tolerated, Up with Assist                 Patient's date of birth confirmed: Yes    SAFETY:  Safety Devices  Safety Devices in place: Yes  Type of devices: All fall risk precautions in place    SUBJECTIVE:  Subjective  Subjective: \"No.\"    Pain at start of treatment: No    Pain at end of treatment: No    COGNITION:  Orientation  Overall Orientation Status: Within Functional Limits  Orientation Level: Oriented X4  Cognition  Overall Cognitive Status: Exceptions  Arousal/Alertness: Appears intact  Following Commands: Follows one step commands consistently  Attention Span: Appears intact  Memory: Decreased short term memory;Decreased recall of recent events  Safety Judgement: Decreased awareness of need for assistance;Decreased awareness of need for safety  Problem Solving: Assistance required to identify errors made;Assistance required to implement solutions;Assistance required to correct errors 
OCCUPATIONAL THERAPY  INPATIENT REHAB TREATMENT NOTE  [facility]      NAME: Smooth Easley  : 1975 (49 y.o.)  MRN: [id number]  CODE STATUS: Full Code  Room: R254/R254-01    Date of Service: 2025    Referring Physician: Dr Todd for Dr Ramirez  Rehab Diagnosis: Impaired mobility and ADL's due to R MCA infarct    Hospital course:   Comments: 49 y.o. female patient who presented to [facility] ER 6/10 with initial c/o chest tightness/ pressure and tingling to right side. Noted to be hypertensive. EKG showed ectopic atrial rhythm with HR 87,  no acute STEMI. Trops obtained and noted to be improved with each draw. While in ER, patient was noted to have left sided weakness, facial droop and confusion.Initial CT of head without acute findings. Additional work up confirmed   Lg non-hemorrhagic acute R MCA infarct in addition to adrenal and liver massess. Neurology, heme/onc and IR consulted.      Restrictions  Restrictions/Precautions  Restrictions/Precautions: Fall Risk, Modified Diet  Activity Level: Up as Tolerated, Up with Assist         Patient's date of birth confirmed: Yes    SAFETY:  Safety Devices  Safety Devices in place: Yes  Type of devices: All fall risk precautions in place    SUBJECTIVE:  Subjective  Subjective: Pt stated that she had to use the bathroom.    Pain at start of treatment: No    Pain at end of treatment: No    COGNITION:  Orientation  Overall Orientation Status: Within Functional Limits  Orientation Level: Oriented to person;Oriented to situation  Cognition  Overall Cognitive Status: Exceptions  Arousal/Alertness: Appears intact  Following Commands: Follows one step commands with increased time;Follows one step commands with repetition  Attention Span: Appears intact  Memory: Decreased short term memory  Safety Judgement: Decreased awareness of need for assistance;Decreased awareness of need for safety  Problem Solving: Assistance required to identify errors 
OCCUPATIONAL THERAPY  INPATIENT REHAB TREATMENT NOTE  [facility]      NAME: Smooth Easley  : 1975 (49 y.o.)  MRN: [id number]  CODE STATUS: Full Code  Room: R261/R261-01    Date of Service: 2025    Referring Physician: Dr Todd for Dr Ramirez  Rehab Diagnosis: Impaired mobility and ADL's due to R MCA infarct    Hospital course:   Comments: 49 y.o. female patient who presented to [facility] ER 6/10 with initial c/o chest tightness/ pressure and tingling to right side. Noted to be hypertensive. EKG showed ectopic atrial rhythm with HR 87,  no acute STEMI. Trops obtained and noted to be improved with each draw. While in ER, patient was noted to have left sided weakness, facial droop and confusion.Initial CT of head without acute findings. Additional work up confirmed   Lg non-hemorrhagic acute R MCA infarct in addition to adrenal and liver massess. Neurology, heme/onc and IR consulted.      Restrictions  Restrictions/Precautions  Restrictions/Precautions: Modified Diet, Fall Risk  Activity Level: Up as Tolerated, Up with Assist                 Patient's date of birth confirmed: Yes    SAFETY:  Safety Devices  Safety Devices in place: Yes  Type of devices: All fall risk precautions in place    SUBJECTIVE:     Pain at start/end of treatment: yes low back  Nursing notified: No  Intervention: Other: discussed self positioning to an upright neutral position d/t leaning to the L    COGNITION:  Orientation  Overall Orientation Status: Within Functional Limits  Orientation Level: Oriented to person;Oriented to place;Oriented to situation;Oriented to time  Cognition  Overall Cognitive Status: Exceptions  Arousal/Alertness: Appears intact  Following Commands: Follows one step commands with increased time;Follows one step commands with repetition  Attention Span: Appears intact  Memory: Decreased short term memory  Safety Judgement: Decreased awareness of need for assistance;Decreased awareness of need 
OCCUPATIONAL THERAPY  INPATIENT REHAB TREATMENT NOTE  [facility]      NAME: Smooth Easley  : 1975 (49 y.o.)  MRN: [id number]  CODE STATUS: Full Code  Room: R254/R254-01    Date of Service: 2025    Referring Physician: Dr Todd for Dr Ramirez  Rehab Diagnosis: Impaired mobility and ADL's due to R MCA infarct    Hospital course:   Comments: 49 y.o. female patient who presented to [facility] ER 6/10 with initial c/o chest tightness/ pressure and tingling to right side. Noted to be hypertensive. EKG showed ectopic atrial rhythm with HR 87,  no acute STEMI. Trops obtained and noted to be improved with each draw. While in ER, patient was noted to have left sided weakness, facial droop and confusion.Initial CT of head without acute findings. Additional work up confirmed   Lg non-hemorrhagic acute R MCA infarct in addition to adrenal and liver massess. Neurology, heme/onc and IR consulted.    Restrictions  Restrictions/Precautions  Restrictions/Precautions: Fall Risk, Modified Diet       Patient's date of birth confirmed: Yes    SAFETY:  Safety Devices  Type of devices: All fall risk precautions in place    SUBJECTIVE:   \"I showered yesterday\"    Pain at start of treatment: No    Pain at end of treatment: No    OBJECTIVE:    Pt presents in w/c in the gym. ADL + family training scheduled with pt's daughter however daughter not present and pt was already dressed and bathed.     IADL/standing balance acitivity    Pt assisted with preparing pot of coffee at counter level.   STSs completed x 3 from w/c to countertop. Pt needing Marylou for STS and Min-ModA to maintain balance in standing while completing tasks without UE support. Pt also required the L knee to be blocked to prevent buckle. R side lean still noted. With multiple stands pt was able to complete all steps to prepare coffee with min cues.     While seated EOM, focused on LUE weight bearing, seated balance, strength, and activity tolerance 
OCCUPATIONAL THERAPY  INPATIENT REHAB TREATMENT NOTE  [facility]      NAME: Smooth Easley  : 1975 (49 y.o.)  MRN: [id number]  CODE STATUS: Full Code  Room: R254/R254-01    Date of Service: 2025    Referring Physician: Dr Todd for Dr Ramirez  Rehab Diagnosis: Impaired mobility and ADL's due to R MCA infarct    Hospital course:   Comments: 49 y.o. female patient who presented to [facility] ER 6/10 with initial c/o chest tightness/ pressure and tingling to right side. Noted to be hypertensive. EKG showed ectopic atrial rhythm with HR 87,  no acute STEMI. Trops obtained and noted to be improved with each draw. While in ER, patient was noted to have left sided weakness, facial droop and confusion.Initial CT of head without acute findings. Additional work up confirmed   Lg non-hemorrhagic acute R MCA infarct in addition to adrenal and liver massess. Neurology, heme/onc and IR consulted.      Restrictions  Restrictions/Precautions  Restrictions/Precautions: Fall Risk                 Patient's date of birth confirmed: Yes    SAFETY:  Safety Devices  Safety Devices in place: Yes  Type of devices: All fall risk precautions in place    SUBJECTIVE:   \"I am trying\"    Pain at start of treatment: Yes: 6/10    Pain at end of treatment: Yes: 6/10    Location: left arm   Description: throbbing   Nursing notified: Declined  Nurse: N/A  Intervention: Other: Patient reported that she was recently medicated just before she went to PT at 0900    COGNITION:     WFL    OBJECTIVE:     Patient was seen for shower ADL. Please see status below     Grooming/Oral Hygiene  Assistance Level: Set-up  Skilled Clinical Factors: Patient is able to complete washing her face and hands as well as oral care and brushing her hair. Patient is able to put the conditioner in her hair after therapist squeezes it into her hand. Patient was able to remove the lid from her anti persperant and put it on while therapist held her arm out 
OCCUPATIONAL THERAPY  INPATIENT REHAB TREATMENT NOTE  [facility]      NAME: Smooth Easley  : 1975 (49 y.o.)  MRN: [id number]  CODE STATUS: Full Code  Room: R261/R261-01    Date of Service: 2025    Referring Physician: Dr Todd for Dr Ramirez  Rehab Diagnosis: Impaired mobility and ADL's due to R MCA infarct    Hospital course:   Comments: 49 y.o. female patient who presented to [facility] ER 6/10 with initial c/o chest tightness/ pressure and tingling to right side. Noted to be hypertensive. EKG showed ectopic atrial rhythm with HR 87,  no acute STEMI. Trops obtained and noted to be improved with each draw. While in ER, patient was noted to have left sided weakness, facial droop and confusion.Initial CT of head without acute findings. Additional work up confirmed   Lg non-hemorrhagic acute R MCA infarct in addition to adrenal and liver massess. Neurology, heme/onc and IR consulted.      Restrictions  Restrictions/Precautions  Restrictions/Precautions: Modified Diet, Fall Risk  Activity Level: Up as Tolerated, Up with Assist                 Patient's date of birth confirmed: Yes    SAFETY:  Safety Devices  Safety Devices in place: Yes  Type of devices: All fall risk precautions in place    SUBJECTIVE:  Subjective  Subjective: Pt with flat affect, reports fatigue    Pain at start of treatment: No    Pain at end of treatment: No      COGNITION:  Orientation  Overall Orientation Status: Within Functional Limits  Orientation Level: Oriented to person;Oriented to place;Oriented to situation;Oriented to time  Cognition  Overall Cognitive Status: Exceptions  Arousal/Alertness: Appears intact  Following Commands: Follows one step commands with increased time;Follows one step commands with repetition  Attention Span: Appears intact  Memory: Decreased short term memory  Safety Judgement: Decreased awareness of need for assistance;Decreased awareness of need for safety  Problem Solving: Assistance 
OCCUPATIONAL THERAPY  INPATIENT REHAB TREATMENT NOTE  [facility]      NAME: Smooth Easley  : 1975 (49 y.o.)  MRN: [id number]  CODE STATUS: Full Code  Room: R254/R254-01    Date of Service: 2025    Referring Physician: Dr Todd for Dr Ramirez  Rehab Diagnosis: Impaired mobility and ADL's due to R MCA infarct    Hospital course:   Comments: 49 y.o. female patient who presented to [facility] ER 6/10 with initial c/o chest tightness/ pressure and tingling to right side. Noted to be hypertensive. EKG showed ectopic atrial rhythm with HR 87,  no acute STEMI. Trops obtained and noted to be improved with each draw. While in ER, patient was noted to have left sided weakness, facial droop and confusion.Initial CT of head without acute findings. Additional work up confirmed   Lg non-hemorrhagic acute R MCA infarct in addition to adrenal and liver massess. Neurology, heme/onc and IR consulted.      Restrictions  Restrictions/Precautions  Restrictions/Precautions: Fall Risk                 Patient's date of birth confirmed: Yes    SAFETY:  Safety Devices  Safety Devices in place: Yes  Type of devices: All fall risk precautions in place    SUBJECTIVE:   \"I need to look to the left\"    Pain at start of treatment: No    Pain at end of treatment: No    Patient reported that overnight she had some pain in her left UE but that it resolved this am.     COGNITION:  WFL    OBJECTIVE:  While working on visual perception and increasing ability to compensate for left neglect patient participated in  putting together the [place] puzzle.   Patient assembled [place] 50 piece puzzle.   Patient was very slow with the task requiring increased time to look for where the state piece of the puzzle belonged as well as then placing the piece. Kathi was able to place 14 pieces in 45 minutes with max verbal cues   Patient able to  puzzle pieces with no difficulty with FM and was able to turn pieces into correct position for 
OCCUPATIONAL THERAPY  INPATIENT REHAB TREATMENT NOTE  [facility]      NAME: Smooth Easley  : 1975 (49 y.o.)  MRN: [id number]  CODE STATUS: Full Code  Room: R254/R254-01    Date of Service: 2025    Referring Physician: Dr Todd for Dr Ramirez  Rehab Diagnosis: Impaired mobility and ADL's due to R MCA infarct    Hospital course:   Comments: 49 y.o. female patient who presented to [facility] ER 6/10 with initial c/o chest tightness/ pressure and tingling to right side. Noted to be hypertensive. EKG showed ectopic atrial rhythm with HR 87,  no acute STEMI. Trops obtained and noted to be improved with each draw. While in ER, patient was noted to have left sided weakness, facial droop and confusion.Initial CT of head without acute findings. Additional work up confirmed   Lg non-hemorrhagic acute R MCA infarct in addition to adrenal and liver massess. Neurology, heme/onc and IR consulted.      Restrictions  Restrictions/Precautions  Restrictions/Precautions: Fall Risk, Modified Diet                 Patient's date of birth confirmed: Yes    SAFETY:  Safety Devices  Safety Devices in place: Yes  Type of devices: All fall risk precautions in place    SUBJECTIVE:  Subjective  Subjective: Patient reported that she has not been wearing her wrist cock up splint. She does not remember who removed it but was willing to have it replaced    Pain at start of treatment: Yes: 7/10    Pain at end of treatment: Yes: 6/10    Location: left UE   Description: throbbing  Nursing notified: Declined  Nurse: N/A  Intervention: Other: Patient reported that she recently had pain medication     COGNITION:  Orientation  Overall Orientation Status: Within Functional Limits  Cognition  Arousal/Alertness: Appears intact  Following Commands: Follows one step commands consistently;Follows multistep commands with repetition  Attention Span: Appears intact  Memory: Decreased short term memory;Decreased recall of recent 
Patient assessment complete, patient is A/O this AM   Patient medicated as per MAR and for pain to the rib cage with PRN tylenol.  Patient report 5 of 10 pain with the left side of her rib cage.  Patient tolerated well with medications crushed in applesauce  Electronically signed by Aziza Middleton RN on 6/16/2025 at 12:17 PM      
Patient assessment complete, patient is A/O this AM   Patient sitting up in the chair, no S/S of distress noted at this time   Electronically signed by Aziza Middleton RN on 7/12/2025 at 11:54 AM    
Patient complains of pain to left shoulder. PRN percocet given.  Electronically signed by Marie Sarah LPN on 7/3/2025 at 8:34 AM    Patient complains of pain to left shoulder. PRN percocet given.  Electronically signed by Marie Sarah LPN on 7/3/2025 at 5:19 PM    Patient's bp at 1700 hxs095/81, 81. Scheduled clonidine and hydrodiuril given.  Electronically signed by Marie Sarah LPN on 7/3/2025 at 5:26 PM    
Patient had a small yellow emesis this morning. Scheduled Tums given. Gingerale given. Physician notified.  Electronically signed by Marie Sarah LPN on 7/4/2025 at 9:03 AM    One time PO dose zofran given. Patient went down for KUB.  Electronically signed by Marie Sarah LPN on 7/4/2025 at 10:10 AM    #22 heplock inserted to right hand. Brisk blood return. Patient tolerated well.  Electronically signed by Marie Sarah LPN on 7/4/2025 at 11:44 AM    Soap suds enema given. Instructed patient to try and hold it in for at least 5 minutes but she was unable to hold it in but a couple minutes. She produced very small results.  Electronically signed by Marie Sarah LPN on 7/4/2025 at 5:23 PM    
Patient's bp has been running high today. Hospitalist notified. Clonidine given twice.   Electronically signed by Marie Sarah LPN on 6/29/2025 at 5:32 PM    Lactolose given this morning because patient hasn't had a BM for 5 days.  Electronically signed by Marie Sarah, ALPESH on 6/29/2025 at 5:33 PM    Percocet given this morning for 8/10 pain to left arm.  Electronically signed by Marie Sarah LPN on 6/29/2025 at 5:33 PM    Amlodipine ordered and given.  Electronically signed by Marie Sarah LPN on 6/29/2025 at 5:56 PM    BP at this time is 175/78, 68. Scheduled hydralazine given early. Report given to night RN to recheck.  Electronically signed by Marie Sarah LPN on 6/29/2025 at 7:06 PM      
Physical Therapy  Facility/Department: [facility]  Rehabilitation Discharge note    NAME: Smooth Easley  : 1975  MRN: [id number]    Date of discharge: 25        Past Medical History:   Diagnosis Date    Lumbosacral disc disease     Osteoarthritis      Past Surgical History:   Procedure Laterality Date    CHOLECYSTECTOMY         Restrictions  Restrictions/Precautions  Restrictions/Precautions: Fall Risk  Activity Level: Up as Tolerated, Up with Assist    Objective      Bed Mobility  Overall Assistance Level: Moderate Assistance  Additional Factors: Verbal cues;Increased time to complete;Head of bed flat;With handrails  Roll Left  Assistance Level: Stand by assist  Skilled Clinical Factors: verbal cues for technique completed with and without bedrails  Roll Right  Assistance Level: Stand by assist;Minimal assistance  Skilled Clinical Factors: SBA with use of bedrail. Marylou without  Sit to Supine  Assistance Level: Minimal assistance  Skilled Clinical Factors: Assist LLE into bed  Supine to Sit  Assistance Level: Minimal assistance  Skilled Clinical Factors: assist at LLE to come to EOB  Scooting  Assistance Level: Minimal assistance  Skilled Clinical Factors: Assist with LE placement     Transfers  Surface: Wheelchair;From bed;Standard toilet  Additional Factors: Verbal cues;Hand placement cues;Increased time to complete  Device: Cane  Sit to Stand  Assistance Level: Moderate assistance  Skilled Clinical Factors: left knee and ankle wrapped with ace bandage for support and assistance.  Stand to Sit  Assistance Level: Moderate assistance  Skilled Clinical Factors: improving eccentric control and RUE support when descending  Stand Pivot  Assistance Level: Moderate assistance  Skilled Clinical Factors: face to face transfer, bed to wc, toilet to wc and wc to toilet, completed towards weak side, pt initially impulsive and requires cues for safety and improved technique, daughter completed x1  Car 
Physical Therapy Rehab Treatment Note  Facility/Department[facility]  [facility]       NAME: Smooth Easley  : 1975 (49 y.o.)  MRN: [id number]  CODE STATUS: Full Code    Date of Service: 2025     Restrictions:  Restrictions/Precautions: Fall Risk, Modified Diet       SUBJECTIVE:   Subjective: Pt reports she is still not feeling well, RN at bedside starting an IV and states that pt can participate in bed level tx if feeling up for it. Pt agreeable to attempt session.    Pain  Pain: Pt denies pain pre and post tx.      OBJECTIVE:         Scooting  Assistance Level: Moderate assistance  Skilled Clinical Factors: scooting to HOB, pt needed assistance with LLE                 PT Exercises  PROM Exercises: LLE PROM: ankle plantar/dorsiflexion, hip/che flexion/extension, hip abd/add x10ea  A/AROM Exercises: supine on LLE: AP, QS, GS, heel slides, hip abd, bridges (with assist to keep LLE in position) x10ea        Activity Tolerance  Activity Tolerance: Treatment limited secondary to medical complications           ASSESSMENT/PROGRESS TOWARDS GOALS:   Assessment  Assessment: Treatment completed at bed level per nursing and pt request due to pt not feeling well this morning. Focused on supine strengthening and ROM activities, mostly PROM on LLE. Patient was able to assist with scooting to HOB with vc's and assistance from therapist at LLE. Good tolerance to tx with occasional rest breaks, repositioned for comfort at end of session.  Activity Tolerance: Treatment limited secondary to medical complications    Goals:  Long Term Goals  Long Term Goal 1: Pt to complete bed mobility with SBA  Long Term Goal 2: Pt to complete transfers with SBA  Long Term Goal 3: Pt to ambulate 10-25ft with LRD and Marylou  Long Term Goal 4: Pt to maintain midline stand unsupport >/= 1 min with SBA  Long Term Goal 5: Pt will demonstrate w/c mobililty >/= 50ft SBA  Long term goal 6: Pt will demonstrate 1 step with 1 rail mod 
Physical Therapy Rehab Treatment Note  [facility]  [facility]       NAME: Smooth Easley  : 1975 (49 y.o.)  MRN: [id number]  CODE STATUS: Full Code    Date of Service: 2025     Restrictions:  Restrictions/Precautions: Fall Risk, Modified Diet     SUBJECTIVE:      Pain  Pain: head ache 4/10 Pt alreay medicated    OBJECTIVE:         Bed mobility  Rolling to Left: Stand by assistance  Rolling to Right: Minimal assistance  Supine to Sit: Minimal assistance  Sit to Supine: Minimal assistance  Bed Mobility Comments: Step by step VCs for technique.    Transfers  Sit to Stand: Partial/Moderate assistance;Minimal Assistance  Stand to Sit: Minimal Assistance  Bed to Chair: Partial/Moderate assistance;Minimal assistance        Wheelchair Activities  Wheelchair Parts Management: Yes  Right Brakes Level of Assistance: Stand by Assist  Anti-tippers Level of Assistance: Stand by assistance  Propulsion: Yes  Propulsion 1  Propulsion: Manual  Level: Level Tile  Method: RUE;RLE  Level of Assistance: Minimal assistance  Description/ Details: Assist for obstacle management and change of direction.  VCs for WC positioning and set up for transfer.  Distance: 25ft     PT Exercises  Exercise Treatment: bridges x20, LTR x10, hip flexion/ext in supine and s/l x10 reps ea AAROM  PROM Exercises: L ankle PROM in all planes; manual gastroc stretching [id number] L  Functional Mobility Circuit Training: Partial STS focusing on = WBing 99halq5 with min assist; Scooting laterally EOM L and R to increase L LE WB and improve ease with transfers.  Standing Wt shifting with L knee partially flexed.              ASSESSMENT/PROGRESS TOWARDS GOALS: Focused treatment on L LE WBing and facilitation.       Goals:  Long Term Goals  Long Term Goal 1: Pt to complete bed mobility with SBA  Long Term Goal 2: Pt to complete transfers with SBA  Long Term Goal 3: Pt to ambulate 10-25ft with LRD and Marlyou  Long Term Goal 4: Pt to 
Physical Therapy Rehab Treatment Note  [place] [facility] [place]  Room: R261/R261-01       NAME: Smooth Easley  : 1975 (49 y.o.)  MRN: [id number]  CODE STATUS: Full Code    Date of Service: 2025       Restrictions:  Restrictions/Precautions: Fall Risk, Modified Diet       SUBJECTIVE:   Subjective: \"I am having a good day\"    Pain  Pain: denies pain pre/post session      OBJECTIVE:     Transfers  Surface: Wheelchair;To mat;From mat  Additional Factors: Verbal cues;Hand placement cues;Increased time to complete  Device:  (// bars)  Sit to Stand  Assistance Level: Moderate assistance;Minimal assistance  Skilled Clinical Factors: +2 SBA for safety, ModA at blocking LLE into extension, L lean in standing requiring assist and cues for correction with inconsistent carryover varying on fatigue. completed x2 this date  Stand to Sit  Assistance Level: Moderate assistance  Skilled Clinical Factors: decreased eccentric control d/t fatigue and lack of UE support when descending d/t flaccid LUE, completed x2 this date  Bed To/From Chair  Technique: Stand pivot  Assistance Level: Maximum assistance  Skilled Clinical Factors: wc to mat, completed towards strong side, pt initially impulsive and requires cues for safety and improved technique  Stand Pivot  Assistance Level: Maximum assistance  Skilled Clinical Factors: face to face transfer, mat to wc completed towards weak side, pt initially impulsive and requires cues for safety and improved technique, +2 assist donning/doffing pants throughout transitions.    Ambulation  Surface: Level surface  Device: Parallel Bars  Distance: 8'  Activity: Within Unit  Activity Comments: +2 for WC follow  Additional Factors: Verbal cues;Hand placement cues;Increased time to complete  Assistance Level: Maximum assistance;Requires x 2 assistance;Moderate assistance  Gait Deviations: Slow kenny;Narrow base of support;Unsteady gait;Path deviations  Skilled Clinical Factors: 
Physical Therapy Rehab Treatment Note  [place] [facility] [place] [facility]  Room: R254/R254-01       NAME: Smooth Easley  : 1975 (49 y.o.)  MRN: [id number]  CODE STATUS: Full Code    Date of Service: 7/3/2025       Restrictions:  Restrictions/Precautions: Fall Risk, Modified Diet       SUBJECTIVE:   Subjective: Pt reported she is feeling good and able to get her cheese burger today.    Pain  Pain: Pt denies pain pre and post tx.      OBJECTIVE:     Bed Mobility  Overall Assistance Level: Maximum Assistance  Additional Factors: Verbal cues;Increased time to complete;Head of bed flat;Without handrails  Roll Left  Assistance Level: Moderate assistance  Skilled Clinical Factors: vc's for technique and sequencing.  Roll Right  Assistance Level: Moderate assistance  Skilled Clinical Factors: increased difficulty d/t flaccid LUE, hand over hand assist to complete full roll  Sit to Supine  Assistance Level: Moderate assistance  Skilled Clinical Factors: assist at BLE, cues for R elbow lean for improved trunk positioning with return to supine  Supine to Sit  Assistance Level: Moderate assistance  Skilled Clinical Factors: assist at trunk and LLE  Scooting  Assistance Level: Moderate assistance  Skilled Clinical Factors: scooting towards EOB, assist needed with LLE    Transfers  Surface: Standard toilet;Wheelchair  Additional Factors: Verbal cues;Hand placement cues;Increased time to complete  Sit to Stand  Assistance Level: Minimal assistance;Moderate assistance (stand pivot transfer.)  Skilled Clinical Factors: assist to position LLE , good use of RUE to assist to standing, increased assistance was needed to stabilize as she fatigued toward end of session  Stand to Sit  Assistance Level: Minimal assistance  Stand Pivot  Assistance Level: Moderate assistance  Skilled Clinical Factors: face to face transfer, toilet to wc completed towards weak side, pt initially impulsive and requires cues for safety and improved 
Physical Therapy Rehab Treatment Note  Facility/Department: [facility] [place] [facility]  Room: R261/R261-01       NAME: Smooth Easley  : 1975 (49 y.o.)  MRN: [id number]  CODE STATUS: Full Code    Date of Service: 2025       Restrictions:  Restrictions/Precautions: Modified Diet, Fall Risk       SUBJECTIVE:   Subjective: \"I am doing good\"    Pain  Pain: denies pain pre/post session      OBJECTIVE:     Transfers  Surface: Wheelchair  Additional Factors: Verbal cues;Hand placement cues;Increased time to complete  Device:  (// bars)  Sit to Stand  Assistance Level: Moderate assistance;Minimal assistance  Skilled Clinical Factors: +2 SBA for safety, ModA at blocking LLE into extension, L lean in standing requiring assist and cues for correction with inconsistent carryover varying on fatigue. completed x2 this date  Stand to Sit  Assistance Level: Moderate assistance  Skilled Clinical Factors: decreased eccentric control d/t fatigue and lack of UE support when descending d/t flaccid LUE, completed x2 this date    Ambulation  Surface: Level surface  Device: Parallel Bars  Distance: 8'  Activity: Within Unit  Activity Comments: +2 for WC follow  Additional Factors: Verbal cues;Hand placement cues;Increased time to complete  Assistance Level: Maximum assistance;Requires x 2 assistance;Moderate assistance  Gait Deviations: Slow kenny;Narrow base of support;Unsteady gait;Path deviations  Skilled Clinical Factors: assist for weight shifting and RLE advancement, buckling noted in stance phase but recovers with cues for glute set and stability, fatigues quickly with short distances and requires seated rest break post gait. +2 for WC follow only, no hands on assist required.    Wheelchair  Surface: Level surface  Device: Standard wheelchair  Additional Factors: Verbal cues;Hand placement cues;Increased time to complete  Assistance Required to Manage Parts: All wheelchair parts  Assistance Level for Propulsion: Stand by 
Physical Therapy Rehab Treatment Note  [facility] [place] [place] [facility]  [facility]       NAME: Smooth Easley  : 1975 (49 y.o.)  MRN: [id number]  CODE STATUS: Full Code    Date of Service: 2025     Restrictions:  Restrictions/Precautions: Fall Risk, Modified Diet    SUBJECTIVE:   Subjective: \" I want to get back in bed\"    Pain  Pain: 6/10 pain L hip pre/post session- received meds    OBJECTIVE:     Roll Left  Assistance Level: Stand by assist  Skilled Clinical Factors: Verbal cues for technique completed with and without bedrails  Roll Right  Assistance Level: Stand by assist;Moderate assistance  Skilled Clinical Factors: SBA with use of bedrail. Marylou without  Sit to Supine  Assistance Level: Minimal assistance  Skilled Clinical Factors: Assist LLE into bed  Scooting  Assistance Level: Moderate assistance  Skilled Clinical Factors: Assist with LE placement    Bed To/From Chair  Technique: Stand pivot  Assistance Level: Moderate assistance  Skilled Clinical Factors: Initially attempting slideboard transfer. Once board was placed patient stands and pivots to bed.    ASSESSMENT/PROGRESS TOWARDS GOALS:   Assessment  Assessment: Patient with increased fatigue this session requesting to return to bed of scheduled PT. Patient completes stand pivot transfer back to bed with moderate assistance. Multiple rolls completed to improve core activation/ technique  Activity Tolerance: Patient limited by fatigue  Discharge Recommendations: Continue to assess pending progress;Therapy recommended at discharge    Goals:  Long Term Goals  Long Term Goal 1: Pt to complete bed mobility with SBA  Long Term Goal 2: Pt to complete transfers with SBA  Long Term Goal 3: Pt to ambulate 10-25ft with LRD and Marylou  Long Term Goal 4: Pt to maintain midline stand unsupport >/= 1 min with SBA  Long Term Goal 5: Pt will demonstrate w/c mobililty >/= 50ft SBA  Long term goal 6: Pt will demonstrate 1 step with 1 rail mod A  Patient 
Physical Therapy Rehab Treatment Note  [facility]Department[facility] [place] [facility]  [facility]       NAME: Smooth Easley  : 1975 (49 y.o.)  MRN: [id number]  CODE STATUS: Full Code    Date of Service: 2025       Restrictions:  Restrictions/Precautions: Modified Diet, Fall Risk       SUBJECTIVE:   Subjective: \"I will walk again\"    Pain  Pain: 8/10 pain in buttocks pre/post session- received meds      OBJECTIVE:     Transfers  Surface: Wheelchair  Additional Factors: Verbal cues;Hand placement cues;Increased time to complete  Device:  (// bars)  Sit to Stand  Assistance Level: Moderate assistance;Minimal assistance  Skilled Clinical Factors: +2 SBA for safety, ModA at blocking LLE into extension, L lean in standing requiring assist and cues for correction with inconsistent carryover varying on fatigue. completed x2 this date  Stand to Sit  Assistance Level: Moderate assistance  Skilled Clinical Factors: decreased eccentric control d/t fatigue and lack of UE support when descending d/t flaccid LUE, completed x2 this date    Ambulation  Surface: Level surface  Device: Parallel Bars  Distance: 6'  Activity: Within Unit  Activity Comments: +2 for WC follow  Additional Factors: Verbal cues;Hand placement cues;Increased time to complete  Assistance Level: Maximum assistance;Requires x 2 assistance  Gait Deviations: Slow kenny;Narrow base of support;Unsteady gait;Path deviations  Skilled Clinical Factors: assist for weight shifting and RLE advancement, buckling noted in stance phase but recovers with cues for glute set and stability, fatigues quickly with short distances and requires seated rest break post gait.    Wheelchair  Surface: Level surface  Device: Standard wheelchair  Additional Factors: Verbal cues;Hand placement cues;Increased time to complete  Assistance Required to Manage Parts: All wheelchair parts  Assistance Level for Propulsion: Moderate assistance;Maximum assistance  Propulsion Method: Right 
Physical Therapy Rehab Treatment Note  [facility] [place] [facility]  [facility]       NAME: Smooth Easley  : 1975 (49 y.o.)  MRN: [id number]  CODE STATUS: Full Code    Date of Service: 2025       Restrictions:  Restrictions/Precautions: Fall Risk, Modified Diet       SUBJECTIVE:   Subjective: \"Lunch was good\"    Pain  Pain: denies pain pre/post session      OBJECTIVE:     Transfers  Surface: Wheelchair;To chair with arms;From chair with arms;To mat;From mat  Additional Factors: Verbal cues;Hand placement cues;Increased time to complete  Device: Cane (LBQC)  Sit to Stand  Assistance Level: Minimal assistance  Skilled Clinical Factors: assist to position LLE on treamine steady, good use of RUE to assist to standing with only slight lifting assist needed from therapist  Stand to Sit  Assistance Level: Minimal assistance  Skilled Clinical Factors: Improving eccentric control and RUE support when descending  Bed To/From Chair  Technique: Stand pivot  Assistance Level: Moderate assistance  Skilled Clinical Factors: wc to mat, completed towards strong side, Better awareness and ability with put weight through LLE to advance RLE. Knee block in place to keep LLE in extension.  Stand Pivot  Assistance Level: Moderate assistance  Skilled Clinical Factors: face to face transfer, mat to wc completed towards weak side, pt initially impulsive and requires cues for safety and improved technique,    Ambulation  Surface: Level surface  Device: Quad Cane  Distance: 8 steps fwd  Activity: Within Unit  Activity Comments: +2 for WC follow  Additional Factors: Verbal cues;Hand placement cues;Increased time to complete  Assistance Level: Maximum assistance;Requires x 2 assistance  Gait Deviations: Slow kenny;Narrow base of support;Unsteady gait;Path deviations  Skilled Clinical Factors: +2 SBA for safety but no hands on assist required, MaxA required from therapist for weight shifting and LLE advancement, increased 
Physical Therapy Rehab Treatment Note  Facility/Department: [facility] [place] [facility]  [facility]       NAME: Smooth Easley  : 1975 (49 y.o.)  MRN: [id number]  CODE STATUS: Full Code    Date of Service: 2025       Restrictions:  Restrictions/Precautions: Fall Risk, Modified Diet       SUBJECTIVE:   Subjective: \"I tried to sleep well\"    Pain  Pain: 10 headache pre/post session- received meds      OBJECTIVE:     Transfers  Surface: Wheelchair;To mat;From mat  Additional Factors: Verbal cues;Hand placement cues;Increased time to complete  Device: Cane  Sit to Stand  Assistance Level: Minimal assistance;Moderate assistance  Skilled Clinical Factors: assist to position LLE , good use of RUE to assist to standing, increased assistance was needed to stabilize as she fatigued toward end of session  Stand to Sit  Assistance Level: Minimal assistance  Skilled Clinical Factors: Improving eccentric control and RUE support when descending  Sliding Board  Assistance Level: Minimal assistance  Skilled Clinical Factors: wc to mat and mat to wc, completed towards strong side, Better awareness and ability with put weight through LLE to advance along slideboard.    Ambulation  Surface: Level surface  Device: Quad Cane (LBQC)  Distance: 10'  Activity: Within Unit  Activity Comments: +2 for WC follow and to assist with weightshifting to RLE  Additional Factors: Verbal cues;Hand placement cues;Increased time to complete  Assistance Level: Moderate assistance  Gait Deviations: Slow kenny;Narrow base of support;Unsteady gait;Path deviations  Skilled Clinical Factors: ModA required from therapist for weight shifting and LLE advancement, increased cues for trunk control and glute engagement in stance phase. knee block required at LLE to prevent buckling but improved engagement as opposed to previous sessions. +2 only for WC follow and safety. L ACE wrap and dorsiflex wrap applied.    PT Exercises  A/AROM Exercises: seated RLE 
Physical Therapy Rehab Treatment Note  [place] [facility] [place] [facility]  [facility]       NAME: Smooth Easley  : 1975 (49 y.o.)  MRN: [id number]  CODE STATUS: Full Code    Date of Service: 2025       Restrictions:  Restrictions/Precautions: Modified Diet, Fall Risk       SUBJECTIVE:   Subjective: \"A little tired\"    Pain  Pain: denies pain pre/post session      OBJECTIVE:     Bed Mobility  Overall Assistance Level: Maximum Assistance  Additional Factors: Verbal cues;Increased time to complete;Head of bed raised;With handrails  Roll Left  Assistance Level: Moderate assistance  Skilled Clinical Factors: vc's for technique and sequencing, able to assist with completing roll using RUE, assist for LUE placement throughout transition  Roll Right  Assistance Level: Maximum assistance  Skilled Clinical Factors: increased difficulty d/t flaccid LUE, hand over hand assist to complete full roll  Sit to Supine  Assistance Level: Maximum assistance  Skilled Clinical Factors: assist at trunk and BLE  Supine to Sit  Skilled Clinical Factors: N/T- in chair at start of session  Scooting  Assistance Level: Moderate assistance  Skilled Clinical Factors: scooting towards EOB    Transfers  Surface: Wheelchair;To bed  Additional Factors: Verbal cues;Hand placement cues;Increased time to complete  Device:  (// bars)  Sit to Stand  Assistance Level: Moderate assistance;Minimal assistance  Skilled Clinical Factors: +2 SBA for safety, ModA at blocking LLE into extension, L lean in standing requiring assist and cues for correction with inconsistent carryover varying on fatigue. completed 2 this date  Stand to Sit  Assistance Level: Moderate assistance  Skilled Clinical Factors: decreased eccentric control d/t fatigue and lack of UE support when descending d/t flaccid LUE, completed x2 this date  Bed To/From Chair  Technique: Stand pivot  Assistance Level: Maximum assistance  Skilled Clinical Factors: wc to bed, completed 
Physical Therapy Rehab Treatment Note  [place] [facility] [place] [facility]  Room: R261/R261-01       NAME: Smooth Easley  : 1975 (49 y.o.)  MRN: [id number]  CODE STATUS: Full Code    Date of Service: 2025       Restrictions:  Restrictions/Precautions: Fall Risk, Modified Diet       SUBJECTIVE:   Subjective: \"I am doing well.\"    Pain  Pain: denies pain pre/post session      OBJECTIVE:             Bed Mobility  Overall Assistance Level: Maximum Assistance  Additional Factors: Verbal cues;Increased time to complete;Head of bed raised  Roll Left  Assistance Level: Moderate assistance  Skilled Clinical Factors: vc's for technique and sequencing. Assisted patient to seated position with assist of upper trunk.  Scooting  Assistance Level: Moderate assistance  Skilled Clinical Factors: scooting towards EOB    Transfers  Surface: Wheelchair;To bed  Additional Factors: Verbal cues;Hand placement cues;Increased time to complete  Device:  (// bars)  Sit to Stand  Assistance Level: Moderate assistance;Minimal assistance  Skilled Clinical Factors: ModA to block LLE in extension when performing. Good use of RUE to assist to standing.  Stand to Sit  Assistance Level: Moderate assistance;Minimal assistance  Skilled Clinical Factors: Improving eccentric control and RUE support when descending ; completed x5  Bed To/From Chair  Technique: Stand pivot  Assistance Level: Maximum assistance  Skilled Clinical Factors: wc to bed, completed towards strong side, Better awareness and ability with put weight through LLE to advance RLE. Knee block in place to keep LLE in extension.                                   Activity Tolerance  Activity Tolerance: Patient tolerated treatment well             ASSESSMENT/PROGRESS TOWARDS GOALS:   Assessment  Assessment: Pt agreeable to therapy in room this date to focus on bed mobility and sit to stand transfers. Pt demonstrates improved safety and strength to complete STS transfers with less 
Physical Therapy Rehab Treatment Note  [facility] [place] [facility]  [facility]       NAME: Smooth Easley  : 1975 (49 y.o.)  MRN: [id number]  CODE STATUS: Full Code    Date of Service: 2025       Restrictions:  Restrictions/Precautions: Fall Risk, Modified Diet       SUBJECTIVE:   Subjective: \"I didn't sleep great\"    Pain  Pain: 10 pain in LUE pre/post session- received meds      OBJECTIVE:     Transfers  Surface: Wheelchair  Additional Factors: Verbal cues;Hand placement cues;Increased time to complete  Device: Cane (LBQC)  Sit to Stand  Assistance Level: Minimal assistance  Skilled Clinical Factors: assist to position LLE , good use of RUE to assist to standing with only slight lifting assist needed from therapist  Stand to Sit  Assistance Level: Minimal assistance  Skilled Clinical Factors: Improving eccentric control and RUE support when descending    Ambulation  Surface: Level surface  Device: Quad Cane  Distance: 8'  Activity: Within Unit  Activity Comments: +2 for WC follow  Additional Factors: Verbal cues;Hand placement cues;Increased time to complete  Assistance Level: Maximum assistance  Gait Deviations: Slow kenny;Narrow base of support;Unsteady gait;Path deviations  Skilled Clinical Factors: +2 SBA for safety but no hands on assist required, MaxA required from therapist for weight shifting and LLE advancement, increased cues for trunk control and glute engagement in stance phase. knee block required at LLE to prevent buckling but improved engagement as opposed to previous sessions.    Wheelchair  Surface: Level surface  Device: Standard wheelchair  Additional Factors: Verbal cues;Hand placement cues;Increased time to complete  Assistance Required to Manage Parts: All wheelchair parts  Assistance Level for Propulsion: Stand by assist;Minimal assistance  Propulsion Method: Right upper extremity;Right lower extremity  Propulsion Quality: Slow velocity;Short strokes;Veers 
Physical Therapy Rehab Treatment Note  [facility] [place] [facility]  [facility]       NAME: Smooth Easley  : 1975 (49 y.o.)  MRN: [id number]  CODE STATUS: Full Code    Date of Service: 2025       Restrictions:  Restrictions/Precautions: Fall Risk, Modified Diet    SUBJECTIVE: Patient supine in bed, agreeable to treatment.     Pain   Denies pain pre and post    OBJECTIVE:        Bed mobility  Rolling to Left: Minimal assistance  Rolling to Right: Minimal assistance  Supine to Sit: Partial/Moderate assistance  Sit to Supine: Partial/Moderate assistance  Bed Mobility Comments: hob flat, bed rails used    Transfers  Sit to Stand: Partial/Moderate assistance  Stand to Sit: Partial/Moderate assistance  Bed to Chair: Substantial/Maximal assistance  Comment: toward right bed to wc, max - post tx max/dep wc to bed dt fatigue       Neuromuscular Education  NDT Treatment: Standing  Facilitation techniques: seated: tapping, aarom with trace mm movements on lle for quad and hamstring activation / supine hooklying abd/add x10 greater difficulty add vs abd  Joint Compression: left hip, knee and ankle compressions x5 supine and seated  Functional Movement Patterns: at hemibar; standing blocked practice of weight shifting, stepping, tke, stance, swing ; fwd and lateral with mod assist to recover balance and postural control, 2nd person for safety and occational additional min assist as patient fatigues        ASSESSMENT/PROGRESS TOWARDS GOALS:   Body Structures, Functions, Activity Limitations Requiring Skilled Therapeutic Intervention: Decreased functional mobility ;Decreased ADL status;Decreased ROM;Decreased tolerance to work activity;Decreased strength;Decreased safe awareness;Decreased cognition;Decreased endurance;Decreased balance;Decreased coordination;Decreased vision/visual deficit  Assessment: Blocked practice of wbing, swing and hip extension to improve gait mm initiation. Patient fatigued post 
Physical Therapy Rehab Treatment Note  [facility] [place] [facility]  [facility]       NAME: Smooth Easley  : 1975 (49 y.o.)  MRN: [id number]  CODE STATUS: Full Code    Date of Service: 2025       Restrictions:  Restrictions/Precautions: Fall Risk, Modified Diet       SUBJECTIVE:   Subjective: \"I am tired but I am okay\"    Pain  Pain: denies pain pre/post session      OBJECTIVE:     Bed Mobility  Overall Assistance Level: Maximum Assistance  Additional Factors: Verbal cues;Increased time to complete;Head of bed flat;With handrails  Roll Left  Assistance Level: Minimal assistance  Skilled Clinical Factors: vc's for technique and sequencing.  Roll Right  Assistance Level: Minimal assistance  Skilled Clinical Factors: increased difficulty d/t flaccid  Sit to Supine  Assistance Level: Moderate assistance  Skilled Clinical Factors: assist at BLE, cues for R elbow lean for improved trunk positioning with return to supine  Supine to Sit  Assistance Level: Moderate assistance  Skilled Clinical Factors: assist at trunk and LLE  Scooting  Assistance Level: Moderate assistance  Skilled Clinical Factors: scooting to HOB, pt needed assistance with LLE    Transfers  Surface: Wheelchair;To mat;From mat;To bed  Additional Factors: Verbal cues;Hand placement cues;Increased time to complete  Device: Cane (LBQC)  Sit to Stand  Assistance Level: Minimal assistance;Moderate assistance  Skilled Clinical Factors: assist to position LLE , good use of RUE to assist to standing, increased assistance was needed to stabilize as she fatigued toward end of session  Stand to Sit  Assistance Level: Minimal assistance  Skilled Clinical Factors: Improving eccentric control and RUE support when descending  Bed To/From Chair  Technique: Stand pivot  Assistance Level: Moderate assistance  Skilled Clinical Factors: wc to bed, completed towards strong side, Better awareness and ability with put weight through LLE to advance RLE. 
Physical Therapy Rehab Treatment Note  Facility/Department: [facility] [facility]  Room: R254R254-01       NAME: Smooth Easley  : 1975 (49 y.o.)  MRN: [id number]  CODE STATUS: Full Code    Date of Service: 2025       Restrictions:  Restrictions/Precautions: Fall Risk, Modified Diet       SUBJECTIVE:   Subjective: \"I am okay\"    Pain  Pain: 6/10 LBP pre/post session- received meds      OBJECTIVE:     Transfers  Surface: Wheelchair  Additional Factors: Verbal cues;Hand placement cues;Increased time to complete  Device: Cane (// bars, maddie rail, LBQC)  Sit to Stand  Assistance Level: Moderate assistance;Minimal assistance  Skilled Clinical Factors: ModA to block LLE in extension when performing. Good use of RUE to assist to standing. completed x3 this date with focus on L quad stability. completed x3 once in // bars, once at maddie rail, once at LBQC  Stand to Sit  Assistance Level: Moderate assistance;Minimal assistance  Skilled Clinical Factors: Improving eccentric control and RUE support when descending ; completed x3    Ambulation  Surface: Level surface  Device: Parallel Bars (maddie rail)  Distance: 8' in // bars, 5' at maddie rail  Activity: Within Unit  Activity Comments: +2 for WC follow  Additional Factors: Verbal cues;Hand placement cues;Increased time to complete  Assistance Level: Moderate assistance  Gait Deviations: Slow kenny;Narrow base of support;Unsteady gait;Path deviations  Skilled Clinical Factors: cues for weight shifting and physical assist required for RLE advancement, L knee block required to prevent buckling in stance phase, cues required for slow controlled movements for motor control with advancement of RLE. +2 for WC follow but no hands on assist required.    Wheelchair  Surface: Level surface  Device: Standard wheelchair  Additional Factors: Verbal cues;Hand placement cues;Increased time to complete  Assistance Required to Manage Parts: All wheelchair parts  Assistance Level for 
Physical Therapy Rehab Treatment Note  Facility/Department: [facility] [place] [facility]  [facility]       NAME: Smooth Easley  : 1975 (49 y.o.)  MRN: [id number]  CODE STATUS: Full Code    Date of Service: 7/15/2025       Restrictions:  Restrictions/Precautions: Fall Risk       SUBJECTIVE:   Subjective: \"I am okay\"    Pain  Pain: denies pain pre/post session      OBJECTIVE:     Bed Mobility  Overall Assistance Level: Moderate Assistance  Additional Factors: Verbal cues;Increased time to complete;Head of bed flat;With handrails  Roll Left  Assistance Level: Stand by assist  Skilled Clinical Factors: verbal cues for technique completed with and without bedrails  Roll Right  Assistance Level: Stand by assist;Minimal assistance  Skilled Clinical Factors: SBA with use of bedrail. Marylou without  Sit to Supine  Assistance Level: Minimal assistance  Skilled Clinical Factors: Assist LLE into bed  Supine to Sit  Assistance Level: Minimal assistance  Skilled Clinical Factors: assist at LLE to come to EOB  Scooting  Assistance Level: Minimal assistance  Skilled Clinical Factors: Assist with LE placement    Transfers  Surface: Wheelchair;From mat;To mat  Additional Factors: Verbal cues;Hand placement cues;Increased time to complete  Sit to Stand  Assistance Level: Moderate assistance  Stand to Sit  Assistance Level: Moderate assistance  Skilled Clinical Factors: improving eccentric control and RUE support when descending  Stand Pivot  Assistance Level: Moderate assistance  Skilled Clinical Factors: face to face transfer, wc to mat and mat to wc, completed towards weak side, pt initially impulsive and requires cues for safety and improved technique, daughter completed x1  Sliding Board  Assistance Level: Minimal assistance  Skilled Clinical Factors: wc to mat and mat to wc, completed towards strong side, Better awareness and ability with put weight through LLE to advance along slideboard. daughter completed x2 ea way    Activity 
Physical Therapy Rehab Treatment Note  Facility/Department: [facility] [place] [facility]  Room: R261R261-01       NAME: Smooth Easley  : 1975 (49 y.o.)  MRN: [id number]  CODE STATUS: Full Code    Date of Service: 2025       Restrictions:  Restrictions/Precautions: Modified Diet, Fall Risk       SUBJECTIVE:   Subjective: \"I am doing okay today\"    Pain  Pain: denies pain pre/post session      OBJECTIVE:     Transfers  Surface: Wheelchair;To mat;From mat  Additional Factors: Verbal cues;Hand placement cues;Increased time to complete  Device:  (// bars)  Sit to Stand  Assistance Level: Moderate assistance;Minimal assistance  Skilled Clinical Factors: +2 SBA for safety, ModA at blocking LLE into extension, L lean in standing requiring assist and cues for correction with inconsistent carryover varying on fatigue. completed x3 this date  Stand to Sit  Assistance Level: Moderate assistance  Skilled Clinical Factors: decreased eccentric control d/t fatigue and lack of UE support when descending d/t flaccid LUE, completed x3 this date  Bed To/From Chair  Technique: Stand pivot  Assistance Level: Maximum assistance  Skilled Clinical Factors: wc to mat, completed towards strong side, pt initially impulsive and requires cues for safety and improved technique  Stand Pivot  Assistance Level: Maximum assistance  Skilled Clinical Factors: face to face transfer, mat to wc, completed towards strong side, pt initially impulsive and requires cues for safety and improved technique    Ambulation  Surface: Level surface  Device: Parallel Bars  Distance: 6'  Activity: Within Unit  Activity Comments: +2 for WC follow  Additional Factors: Verbal cues;Hand placement cues;Increased time to complete  Assistance Level: Maximum assistance;Requires x 2 assistance  Gait Deviations: Slow kenny;Narrow base of support;Unsteady gait;Path deviations  Skilled Clinical Factors: assist for weight shifting and RLE advancement, buckling noted in stance phase 
Physical Therapy Rehab Treatment Note  Facility/Department[facility] [place] [facility]  [facility]       NAME: Smooth Easley  : 1975 (49 y.o.)  MRN: [id number]  CODE STATUS: Full Code    Date of Service: 2025  Chart Reviewed: Yes  Family/Caregiver Present: No    Restrictions:  Restrictions/Precautions: Fall Risk, Modified Diet       SUBJECTIVE:   Subjective: pt agreeable to tx.    Pain   0/10 pre/post tx.       OBJECTIVE:             Bed mobility  Sit to Supine: Minimal assistance  Bed Mobility Comments: Step by step VCs for technique. lifting assistance to get LLE into bed.    Transfers  Sit to Stand: Partial/Moderate assistance  Stand to Sit: Minimal Assistance  Comment: from w/c vc's for sequencing hand placement to LBQC.    Ambulation  Surface: Level surface  Device: Quad Cane  Distance: 5'  Activity: Within Unit  Activity Comments: +2 for trunk and LLE support, assistance needed to advance LLE. WC follow needed.  Additional Factors: Verbal cues;Hand placement cues;Increased time to complete  Assistance Level: Maximum assistance  Gait Deviations: Slow kenny;Narrow base of support;Unsteady gait;Path deviations  Skilled Clinical Factors: ModA required from therapist for weight shifting and LLE advancement, increased cues for trunk control and glute engagement in stance phase. knee block required at LLE to prevent buckling but improved engagement as opposed to previous sessionsy L ACE wrap and dorsiflex wrap applied. to L knee and ankle.         Wheelchair Activities  Propulsion: Yes  Propulsion 1  Propulsion: Manual  Level: Level Tile  Method: RUE;RLE  Level of Assistance: Minimal assistance;Partial/Moderate assistance  Description/ Details: Assist for obstacle management and change of direction.  VCs for WC positioning and set up for transfer. on ramp pt needs Mod A to ascend.  Distance: 150'         PT Exercises  Dynamic Standing Balance Exercises: face to face standing weight shifts with focus on WBing 
Physical Therapy Rehab Treatment Note  [place] [facility] [facility]  [facility]       NAME: Smooth Easley  : 1975 (49 y.o.)  MRN: [id number]  CODE STATUS: Full Code    Date of Service: 2025       Restrictions:  Restrictions/Precautions: Fall Risk, Modified Diet       SUBJECTIVE:   Subjective: \"A little tired\"    Pain  Pain: denies pain pre/post session      OBJECTIVE:     Bed Mobility  Overall Assistance Level: Maximum Assistance  Additional Factors: Verbal cues;Increased time to complete;Head of bed flat;Without handrails  Roll Left  Assistance Level: Moderate assistance  Skilled Clinical Factors: vc's for technique and sequencing.  Roll Right  Assistance Level: Moderate assistance  Skilled Clinical Factors: increased difficulty d/t flaccid LUE, hand over hand assist to complete full roll  Sit to Supine  Assistance Level: Moderate assistance  Skilled Clinical Factors: assist at BLE, cues for R elbow lean for improved trunk positioning with return to supine  Supine to Sit  Assistance Level: Moderate assistance  Skilled Clinical Factors: assist at trunk and LLE  Scooting  Assistance Level: Moderate assistance  Skilled Clinical Factors: scooting towards EOB    Transfers  Surface: Wheelchair;To bed  Additional Factors: Verbal cues;Hand placement cues;Increased time to complete  Device:  (// bars)  Sit to Stand  Assistance Level: Moderate assistance;Minimal assistance  Skilled Clinical Factors: ModA to block LLE in extension when performing. Good use of RUE to assist to standing. completed x3 this date with focus on L quad stability.  Stand to Sit  Assistance Level: Moderate assistance;Minimal assistance  Skilled Clinical Factors: Improving eccentric control and RUE support when descending ; completed x3  Bed To/From Chair  Technique: Stand pivot  Assistance Level: Maximum assistance  Skilled Clinical Factors: wc to bed, completed towards strong side, Better awareness and ability with put weight 
Physical Therapy Rehab Treatment Note  Facility/Department: [facility]  Room: R261/R261-01       NAME: Smooth Easley  : 1975 (49 y.o.)  MRN: [id number]  CODE STATUS: Full Code    Date of Service: 2025       Restrictions:  Restrictions/Precautions: Modified Diet, Fall Risk       SUBJECTIVE:   Subjective: \"I fell yesterday\"    Pain  Pain: 9/10 pain in LBP pre/post session- received meds      OBJECTIVE:     Transfers  Surface: Wheelchair  Additional Factors: Verbal cues;Hand placement cues;Increased time to complete  Device:  (// bars)  Sit to Stand  Assistance Level: Moderate assistance;Minimal assistance  Skilled Clinical Factors: +2 SBA for safety, ModA at blocking LLE into extension, L lean in standing requiring assist and cues for correction with inconsistent carryover varying on fatigue. completed x2 this date  Stand to Sit  Assistance Level: Moderate assistance  Skilled Clinical Factors: decreased eccentric control d/t fatigue and lack of UE support when descending d/t flaccid LUE, completed x2 this date    Ambulation  Surface: Level surface  Device: Parallel Bars  Distance: 6'  Activity: Within Unit  Activity Comments: +2 for WC follow  Additional Factors: Verbal cues;Hand placement cues;Increased time to complete  Assistance Level: Maximum assistance;Requires x 2 assistance  Gait Deviations: Slow kenny;Narrow base of support;Unsteady gait;Path deviations    PT Exercises  A/AROM Exercises: seated RLE recruitment tasks with and without resistance x10 ea  Dynamic Standing Balance Exercises: standing weight shifts in // bars x10 ea direction  Disease-specific Exercises: visual tracking towards L vision field.     Activity Tolerance  Activity Tolerance: Patient tolerated treatment well    ASSESSMENT/PROGRESS TOWARDS GOALS:   Assessment  Assessment: Able to initiate gait training with pt this date, increased assist required but overall improved weight shifting and standing stability. Continues to 
Physical Therapy Rehab Treatment Note  Facility/Department: [facility] [place] [facility]  [facility]       NAME: Smooth Easley  : 1975 (49 y.o.)  MRN: [id number]  CODE STATUS: Full Code    Date of Service: 2025       Restrictions:  Restrictions/Precautions: Fall Risk, Modified Diet       SUBJECTIVE:   Subjective: \"I had a good weekend\"    Pain  Pain: 7/10 L arm pain pre/post session- received meds      OBJECTIVE:     Transfers  Surface: Wheelchair  Additional Factors: Verbal cues;Hand placement cues;Increased time to complete  Device: Cane (// bars, LBQC)  Sit to Stand  Assistance Level: Minimal assistance  Skilled Clinical Factors: assist to position LLE on tremaine steady, good use of RUE to assist to standing with only slight lifting assist needed from therapist; completed x3  Stand to Sit  Assistance Level: Minimal assistance  Skilled Clinical Factors: Improving eccentric control and RUE support when descending; completed x3    Ambulation  Surface: Level surface  Device: Parallel Bars (LBQC)  Distance: 8' in // bars, 5' with LBQC  Activity: Within Unit  Activity Comments: +2 for WC follow  Additional Factors: Verbal cues;Hand placement cues;Increased time to complete  Assistance Level: Moderate assistance;Maximum assistance;Requires x 2 assistance  Gait Deviations: Slow kenny;Narrow base of support;Unsteady gait;Path deviations  Skilled Clinical Factors: ModA of 1 in // bars to complete gait training with WC follow for safety but no hands on assist, assist for weight shifting and LLE advancement. Trialed short distance with LBQC, MaxA of 2 required with WC follow to complete,  assist for weight shifting and trunk stability as well as +2 assist with LBQC sequencing and stability.    Wheelchair  Surface: Level surface  Device: Standard wheelchair  Additional Factors: Verbal cues;Hand placement cues;Increased time to complete  Assistance Required to Manage Parts: All wheelchair parts  Assistance Level for 
Physical Therapy Rehab Treatment Note  Facility/Department: [facility] [place] [facility]  [facility]       NAME: Smooth Easley  : 1975 (49 y.o.)  MRN: [id number]  CODE STATUS: Full Code    Date of Service: 2025     Restrictions:  Restrictions/Precautions: Fall Risk, Modified Diet       SUBJECTIVE:   Subjective: \"Whatever you want to do\"    Pain  Pain: denies pain pre/post session      OBJECTIVE:         Bed Mobility  Additional Factors: Verbal cues;Increased time to complete;Head of bed flat;Without handrails  Sit to Supine  Assistance Level: Moderate assistance  Skilled Clinical Factors: assist at BLE, cues for R elbow lean for improved trunk positioning with return to supine  Scooting  Assistance Level: Moderate assistance  Skilled Clinical Factors: scooting towards EOB, assist needed with LLE    Transfers  Surface: From bed;To bed  Additional Factors: Verbal cues;Hand placement cues;Increased time to complete  Device:  (tremaine steady)  Sit to Stand  Assistance Level: Minimal assistance  Skilled Clinical Factors: assist to position LLE on tremaine steady, good use of RUE to assist to standing with only slight lifting assist needed from therapist; completed x5  Stand to Sit  Assistance Level: Minimal assistance  Skilled Clinical Factors: Improving eccentric control and RUE support when descending; completed x5              PT Exercises  PROM Exercises: LLE PROM: ankle plantar/dorsiflexion, hip/che flexion/extension, hip abd/add x10ea  A/AROM Exercises: supine on RLE: AP, heel slides, hip abd/add x10ea  Functional Mobility Circuit Training: STS x5 from EOB while using tremaine steady  Static Sitting Balance Exercises: sitting EOB with focus on maintaining midline  Static Standing Balance Exercises: static standing in tremaine steady with focus on LLE extension and WBing as able through LLE  Dynamic Standing Balance Exercises: standing weightshifts in tremaine steady 2 x30s        Activity Tolerance  Activity Tolerance: Patient 
Physical Therapy Rehab Treatment Note  Facility/Department: [facility] [place] [facility]  Room: R254/R254-01       NAME: Smooth Easley  : 1975 (49 y.o.)  MRN: [id number]  CODE STATUS: Full Code    Date of Service: 2025       Restrictions:  Restrictions/Precautions: Fall Risk, Modified Diet       SUBJECTIVE:   Subjective: \"I am all good\"    Pain  Pain: denies pain pre/post session      OBJECTIVE:     Transfers  Surface: Wheelchair  Additional Factors: Verbal cues;Hand placement cues;Increased time to complete  Device:  (// bars)  Sit to Stand  Assistance Level: Moderate assistance;Minimal assistance  Skilled Clinical Factors: ModA to block LLE in extension when performing. Good use of RUE to assist to standing.  Stand to Sit  Assistance Level: Moderate assistance;Minimal assistance  Skilled Clinical Factors: Improving eccentric control and RUE support when descending ; completed x5    Ambulation  Surface: Level surface  Device: Parallel Bars  Distance: 8'  Activity: Within Unit  Activity Comments: +2 for WC follow  Additional Factors: Verbal cues;Hand placement cues;Increased time to complete  Assistance Level: Maximum assistance;Requires x 2 assistance;Moderate assistance  Gait Deviations: Slow kenny;Narrow base of support;Unsteady gait;Path deviations  Skilled Clinical Factors: assist for weight shifting and RLE advancement, buckling noted in stance phase but recovers with cues for glute set and stability, fatigues quickly with short distances and requires seated rest break post gait. +2 for WC follow only, no hands on assist required.    Wheelchair  Surface: Level surface  Device: Standard wheelchair  Additional Factors: Verbal cues;Hand placement cues;Increased time to complete  Assistance Required to Manage Parts: All wheelchair parts  Assistance Level for Propulsion: Minimal assistance;Moderate assistance  Propulsion Method: Right upper extremity;Right lower extremity  Propulsion Quality: Slow velocity;Short 
Physical Therapy Rehab Treatment Note  Facility/Department: [facility] [place] [facility]  Room: R261/R261-01       NAME: Smooth Easley  : 1975 (49 y.o.)  MRN: [id number]  CODE STATUS: Full Code    Date of Service: 2025       Restrictions:  Restrictions/Precautions: Modified Diet, Fall Risk       SUBJECTIVE:   Subjective: \"I am doing good\"    Pain  Pain: denies pain pre/post session      OBJECTIVE:     Transfers  Surface: Wheelchair;Standard toilet  Additional Factors: Verbal cues;Hand placement cues;Increased time to complete  Device:  (// bars)  Sit to Stand  Assistance Level: Moderate assistance;Minimal assistance  Skilled Clinical Factors: +2 SBA for safety, ModA at blocking LLE into extension, L lean in standing requiring assist and cues for correction with inconsistent carryover varying on fatigue. completed x2 this date  Stand to Sit  Assistance Level: Moderate assistance  Skilled Clinical Factors: decreased eccentric control d/t fatigue and lack of UE support when descending d/t flaccid LUE, completed x2 this date  Stand Pivot  Assistance Level: Maximum assistance;Requires x 2 assistance  Skilled Clinical Factors: face to face transfer, wc to toilet and toilet to wc, completed towards weak side, pt initially impulsive and requires cues for safety and improved technique, +2 assist donning/doffing pants throughout transitions.    Ambulation  Surface: Level surface  Device: Parallel Bars  Distance: 6'  Activity: Within Unit  Activity Comments: +2 for WC follow  Additional Factors: Verbal cues;Hand placement cues;Increased time to complete  Assistance Level: Maximum assistance;Requires x 2 assistance;Moderate assistance  Gait Deviations: Slow kenny;Narrow base of support;Unsteady gait;Path deviations  Skilled Clinical Factors: assist for weight shifting and RLE advancement, buckling noted in stance phase but recovers with cues for glute set and stability, fatigues quickly with short distances and requires seated 
Physical Therapy Rehab Treatment Note  Facility/Department: [facility] [place] [facility]  Room: R254/R254-01       NAME: Smooth Easley  : 1975 (49 y.o.)  MRN: [id number]  CODE STATUS: Full Code    Date of Service: 2025     Restrictions:  Restrictions/Precautions: Fall Risk, Modified Diet       SUBJECTIVE:   Subjective: Patient reports she's feeling a little bit better this afternoon, agreeable to tx.    Pain  Pain: Pt denies pain pre and post tx.      OBJECTIVE:         Bed Mobility  Additional Factors: Verbal cues;Increased time to complete;Head of bed flat;With handrails  Roll Left  Assistance Level: Moderate assistance  Skilled Clinical Factors: vc's for technique and sequencing.  Roll Right  Assistance Level: Moderate assistance  Skilled Clinical Factors: increased difficulty d/t flaccid LUE, hand over hand assist to complete full roll  Scooting  Assistance Level: Moderate assistance  Skilled Clinical Factors: scooting to HOB, pt needed assistance with LLE                 PT Exercises  PROM Exercises: LLE PROM: ankle plantar/dorsiflexion, hip/che flexion/extension, hip abd/add x10ea  A/AROM Exercises: supine on LLE: AP, QS, GS, heel slides, hip abd, bridges (with assist to keep LLE in position) x10ea  Functional Mobility Circuit Training: blocked practice of rolling completed with emphasis on improving technique and efficiency x3ea way        Activity Tolerance  Activity Tolerance: Treatment limited secondary to medical complications           ASSESSMENT/PROGRESS TOWARDS GOALS:   Assessment  Assessment: Treatment continued to be completed at bed level this afternoon per nursing request due to recent Xray showing partial bowel obstruction and pt with multiple episodes of emesis this morning. Continued to work toward LE ROM/strengthening as well as improving technique and efficiency of bed mobility. She is challenged with rolling to the L especially secondary to flaccid LUE and needs hand over hand assistance to 
Physical Therapy Rehab Treatment Note  [facility] [place] [facility]  [facility]       NAME: Smooth Easley  : 1975 (49 y.o.)  MRN: [id number]  CODE STATUS: Full Code    Date of Service: 2025       Restrictions:  Restrictions/Precautions: Fall Risk, Modified Diet       SUBJECTIVE:   Subjective: \"I hate this chair\"    Pain  Pain: 6/10 pain L hip pre/post session- received meds      OBJECTIVE:     Transfers  Surface: Wheelchair;To mat;From mat  Additional Factors: Verbal cues;Hand placement cues;Increased time to complete  Device: Cane  Sit to Stand  Assistance Level: Minimal assistance;Moderate assistance  Skilled Clinical Factors: assist to position LLE , good use of RUE to assist to standing, increased assistance was needed to stabilize as she fatigued toward end of session  Stand to Sit  Assistance Level: Minimal assistance  Skilled Clinical Factors: Improving eccentric control and RUE support when descending  Sliding Board  Assistance Level: Minimal assistance  Skilled Clinical Factors: wc to mat and mat to wc, completed towards strong side, Better awareness and ability with put weight through LLE to advance along slideboard. daughter completed x2 both ways.    Activity Tolerance  Activity Tolerance: Patient tolerated treatment well    ASSESSMENT/PROGRESS TOWARDS GOALS:   Assessment  Assessment: Pt pleasant and motivated this date, continues to progress with slide board transfers for ease of mobility and promoting improved patient facilitation with tasks, less overall physical assist required this date. Able to slightly increase gait distance this session as opposed to previous.  Activity Tolerance: Patient tolerated treatment well    Goals:  Long Term Goals  Long Term Goal 1: Pt to complete bed mobility with SBA  Long Term Goal 2: Pt to complete transfers with SBA  Long Term Goal 3: Pt to ambulate 10-25ft with LRD and Marylou  Long Term Goal 4: Pt to maintain midline stand unsupport >/= 1 min 
Physical Therapy Rehab Treatment Note  Facility/Department: [facility] [place] [facility]  [facility]       NAME: Smooth Easley  : 1975 (49 y.o.)  MRN: [id number]  CODE STATUS: Full Code    Date of Service: 2025       Restrictions:  Restrictions/Precautions: Fall Risk, Modified Diet       SUBJECTIVE:   Subjective: \"I slept better\"    Pain  Pain: denies pain pre/post session      OBJECTIVE:     Transfers  Surface: Wheelchair;To mat;From mat  Additional Factors: Verbal cues;Hand placement cues;Increased time to complete  Sit to Stand  Assistance Level: Moderate assistance;Minimal assistance  Skilled Clinical Factors: ModA to block LLE in extension when performing. Good use of RUE to assist to standing. completed at EOM this date x2 with focus on L quad stability.  Stand to Sit  Assistance Level: Moderate assistance;Minimal assistance  Skilled Clinical Factors: Improving eccentric control and RUE support when descending ; completed x2  Bed To/From Chair  Technique: Stand pivot  Assistance Level: Maximum assistance  Skilled Clinical Factors: wc to mat, completed towards strong side, Better awareness and ability with put weight through LLE to advance RLE. Knee block in place to keep LLE in extension.  Stand Pivot  Assistance Level: Maximum assistance  Skilled Clinical Factors: face to face transfer, mat to wc completed towards weak side, pt initially impulsive and requires cues for safety and improved technique,    PT Exercises  A/AROM Exercises: seated RLE recruitment tasks with and without resistance x10 ea  Dynamic Sitting Balance Exercises: seated balance reaching across body and midline with focus on core recruitment, seated trunk rotation with use of orange cones, seated ball tap back and forth with rehab aide- focus on balance maintenance with dynamic quick reaching.     Activity Tolerance  Activity Tolerance: Patient tolerated treatment well    ASSESSMENT/PROGRESS TOWARDS GOALS:   Assessment  Assessment: Pt 
Physical Therapy Rehab Treatment Note  Facility/Department[facility]  [facility]       NAME: Smooth Easley  : 1975 (49 y.o.)  MRN: [id number]  CODE STATUS: Full Code    Date of Service: 2025       Restrictions:  Restrictions/Precautions: Fall Risk, Modified Diet       SUBJECTIVE:   Subjective: \"I am tired today\"    Pain  Pain: denies pain pre/post session      OBJECTIVE:     Bed Mobility  Overall Assistance Level: Maximum Assistance  Additional Factors: Verbal cues;Increased time to complete;Head of bed flat;Without handrails  Roll Left  Assistance Level: Moderate assistance  Skilled Clinical Factors: vc's for technique and sequencing.  Roll Right  Assistance Level: Moderate assistance  Skilled Clinical Factors: increased difficulty d/t flaccid LUE, hand over hand assist to complete full roll  Sit to Supine  Assistance Level: Moderate assistance  Skilled Clinical Factors: assist at BLE, cues for R elbow lean for improved trunk positioning with return to supine  Supine to Sit  Assistance Level: Moderate assistance  Skilled Clinical Factors: assist at trunk and LLE  Scooting  Assistance Level: Moderate assistance  Skilled Clinical Factors: scooting towards EOB    Transfers  Surface: Wheelchair;To bed;To mat;From mat  Additional Factors: Verbal cues;Hand placement cues;Increased time to complete  Device:  (// bars)  Sit to Stand  Assistance Level: Moderate assistance;Minimal assistance  Skilled Clinical Factors: ModA to block LLE in extension when performing. Good use of RUE to assist to standing. completed at EOM this date x2 with focus on L quad stability.  Stand to Sit  Assistance Level: Moderate assistance;Minimal assistance  Skilled Clinical Factors: Improving eccentric control and RUE support when descending ; completed x2  Bed To/From Chair  Technique: Stand pivot  Assistance Level: Maximum assistance  Skilled Clinical Factors: wc to mat and wc to bed, completed towards strong side, Better 
Physical Therapy Rehab Treatment Note  Facility/Department: [facility]  [facility]       NAME: Smooth Easley  : 1975 (49 y.o.)  MRN: [id number]  CODE STATUS: Full Code    Date of Service: 2025       Restrictions:  Restrictions/Precautions: Fall Risk, Modified Diet       SUBJECTIVE:   Subjective: \"A little tired\"    Pain  Pain: denies pain pre/post session      OBJECTIVE:     Bed Mobility  Overall Assistance Level: Maximum Assistance  Additional Factors: Verbal cues;Increased time to complete;Head of bed raised  Roll Left  Assistance Level: Moderate assistance  Skilled Clinical Factors: vc's for technique and sequencing. Assisted patient to seated position with assist of upper trunk.  Roll Right  Assistance Level: Maximum assistance  Skilled Clinical Factors: increased difficulty d/t flaccid LUE, hand over hand assist to complete full roll  Sit to Supine  Assistance Level: Maximum assistance  Skilled Clinical Factors: assist at trunk and BLE  Supine to Sit  Assistance Level: Maximum assistance  Skilled Clinical Factors: assist at trunk and BLE  Scooting  Assistance Level: Moderate assistance  Skilled Clinical Factors: scooting towards EOB    Transfers  Surface: Wheelchair;To bed  Additional Factors: Verbal cues;Hand placement cues;Increased time to complete  Device:  (// bars)  Sit to Stand  Assistance Level: Moderate assistance;Minimal assistance  Skilled Clinical Factors: ModA to block LLE in extension when performing. Good use of RUE to assist to standing.  Stand to Sit  Assistance Level: Moderate assistance;Minimal assistance  Skilled Clinical Factors: Improving eccentric control and RUE support when descending ; completed x5  Bed To/From Chair  Technique: Stand pivot  Assistance Level: Maximum assistance  Skilled Clinical Factors: wc to bed, completed towards strong side, Better awareness and ability with put weight through LLE to advance RLE. Knee block in place to keep LLE in 
Physical Therapy Rehab Treatment Note  [facility]ility[facility]Department[facility]  Room: R261/R261-01       NAME: Smooth Easley  : 1975 (49 y.o.)  MRN: [id number]  CODE STATUS: Full Code    Date of Service: 2025       Restrictions:  Restrictions/Precautions: Modified Diet, Swallowing - Thickened Liquids, Fall Risk       SUBJECTIVE:   Subjective: \"How are you\"    Pain  Pain: 5/10 pain pre/post session in ribs- received meds      OBJECTIVE:     Transfers  Surface: Wheelchair  Additional Factors: Verbal cues;Hand placement cues;Increased time to complete  Device:  (// bars)  Sit to Stand  Assistance Level: Moderate assistance;Minimal assistance  Skilled Clinical Factors: +2 SBA for safety, ModA at blocking LLE into extension, L lean in standing requiring assist and cues for correction with inconsistent carryover varying on fatigue. completed x3 this date  Stand to Sit  Assistance Level: Moderate assistance  Skilled Clinical Factors: decreased eccentric control d/t fatigue and lack of UE support when descending d/t flaccid LUE, completed x3 this date  Stand Pivot  Assistance Level: Maximum assistance;Requires x 2 assistance  Skilled Clinical Factors: face to face transfer, bed to , +2 assist required to assist with guiding hips into wc, completed towards weak side this session    PT Exercises  Dynamic Standing Balance Exercises: standing weight shifts in // bars x10 ea direction, standing alternating step fwd/bwd x5 BLE  Disease-specific Exercises: visual tracking towards L vision field.     Activity Tolerance  Activity Tolerance: Patient tolerated treatment well    ASSESSMENT/PROGRESS TOWARDS GOALS:   Assessment  Assessment: Pt pleasant and motivated this date, able to transfer to bedside  this session to promote OOB activity. Demonstrates significant L neglect with tasks and requires constant cues for attention to L side and for LUE placement throughout transfers.  Activity Tolerance: Patient tolerated treatment 
Physical Therapy Rehab Treatment Note  Facility/Department: [facility]  [facility]       NAME: Smooth Easley  : 1975 (49 y.o.)  MRN: [id number]  CODE STATUS: Full Code    Date of Service: 2025       Restrictions:  Restrictions/Precautions: Modified Diet, Swallowing - Thickened Liquids, Fall Risk       SUBJECTIVE:   Subjective: \"How are you\"    Pain  Pain: 5/10 pain pre/post session in ribs- received meds      OBJECTIVE:     Bed Mobility  Additional Factors: Verbal cues;Increased time to complete;Head of bed raised;With handrails  Roll Left  Assistance Level: Moderate assistance  Skilled Clinical Factors: vc's for technique and sequencing, able to assist with completing roll using RUE, assist for LUE placement throughout transition  Roll Right  Assistance Level: Maximum assistance  Skilled Clinical Factors: increased difficulty d/t flaccid LUE, hand over hand assist to complete full roll  Sit to Supine  Skilled Clinical Factors: N/T- pt up in chair at end of session to promote OOB activity.  Supine to Sit  Assistance Level: Maximum assistance  Skilled Clinical Factors: increased assist at trunk and BLE to complete, increased lean once seated EOB requiring RUE support on handrail to maintain balance at EOB.  Scooting  Assistance Level: Moderate assistance  Skilled Clinical Factors: scooting towards EOB    Transfers  Surface: From bed;Wheelchair  Additional Factors: Verbal cues;Hand placement cues;Increased time to complete  Device:  (// bars)  Sit to Stand  Assistance Level: Moderate assistance  Skilled Clinical Factors: +2 SBA for safety, ModA at blocking LLE into extension, L lean in standing requiring assist and cues for correction with inconsistent carryover varying on fatigue. completed x2 this date  Stand to Sit  Assistance Level: Moderate assistance  Skilled Clinical Factors: decreased eccentric control d/t fatigue and lack of UE support when descending d/t flaccid LUE, completed x2 this 
Physical Therapy Rehab Treatment Note  Facility/Department: [facility] [place] [facility]  Room: R261/R261-01       NAME: Smooth Easley  : 1975 (49 y.o.)  MRN: [id number]  CODE STATUS: Full Code    Date of Service: 2025       Restrictions:  Restrictions/Precautions: Fall Risk, Modified Diet       SUBJECTIVE:   Subjective: \"I feel better\"    Pain  Pain: 7/10 headache pre/post session- received meds      OBJECTIVE:     Transfers  Surface: Wheelchair  Additional Factors: Verbal cues;Hand placement cues;Increased time to complete  Device:  (// bars)  Sit to Stand  Skilled Clinical Factors: ModA to block LLE in extension when performing. Good use of RUE to assist to standing.  Stand to Sit  Assistance Level: Moderate assistance;Minimal assistance  Skilled Clinical Factors: Improving eccentric control and RUE support when descending ; completed x5    Ambulation  Surface: Level surface  Device: Parallel Bars  Distance: 8'  Activity: Within Unit  Activity Comments: +2 for WC follow  Additional Factors: Verbal cues;Hand placement cues;Increased time to complete  Assistance Level: Maximum assistance;Requires x 2 assistance;Moderate assistance  Gait Deviations: Slow kenny;Narrow base of support;Unsteady gait;Path deviations  Skilled Clinical Factors: assist for weight shifting and RLE advancement, buckling noted in stance phase but recovers with cues for glute set and stability, fatigues quickly with short distances and requires seated rest break post gait. +2 for WC follow only, no hands on assist required.    Wheelchair  Surface: Level surface  Device: Standard wheelchair  Additional Factors: Verbal cues;Hand placement cues;Increased time to complete  Assistance Required to Manage Parts: All wheelchair parts  Assistance Level for Propulsion: Minimal assistance;Moderate assistance  Propulsion Method: Right upper extremity;Right lower extremity  Propulsion Quality: Slow velocity;Short strokes;Veers left;Decreased 
Physical Therapy Rehab Treatment Note  Facility/Department: [facility]  Room: R261/R261-01       NAME: Smooth Easley  : 1975 (49 y.o.)  MRN: [id number]  CODE STATUS: Full Code    Date of Service: 2025       Restrictions:  Restrictions/Precautions: Fall Risk, Modified Diet       SUBJECTIVE:   Subjective: \"Lunch was good\"    Pain  Pain: denies pain pre/post session      OBJECTIVE:     Transfers  Surface: Wheelchair  Additional Factors: Verbal cues;Hand placement cues;Increased time to complete  Device:  (// bars)  Sit to Stand  Assistance Level: Moderate assistance;Minimal assistance  Skilled Clinical Factors: +2 SBA for safety, ModA at blocking LLE into extension, L lean in standing requiring assist and cues for correction with inconsistent carryover varying on fatigue. completed x2 this date  Stand to Sit  Assistance Level: Moderate assistance  Skilled Clinical Factors: decreased eccentric control d/t fatigue and lack of UE support when descending d/t flaccid LUE, completed x2 this date    Ambulation  Surface: Level surface  Device: Parallel Bars  Distance: 8'  Activity: Within Unit  Activity Comments: +2 for WC follow  Additional Factors: Verbal cues;Hand placement cues;Increased time to complete  Assistance Level: Maximum assistance;Requires x 2 assistance;Moderate assistance  Gait Deviations: Slow kenny;Narrow base of support;Unsteady gait;Path deviations  Skilled Clinical Factors: assist for weight shifting and RLE advancement, buckling noted in stance phase but recovers with cues for glute set and stability, fatigues quickly with short distances and requires seated rest break post gait. +2 for WC follow only, no hands on assist required.    Wheelchair  Surface: Level surface  Device: Standard wheelchair  Additional Factors: Verbal cues;Hand placement cues;Increased time to complete  Assistance Required to Manage Parts: All wheelchair parts  Assistance Level for Propulsion: Minimal 
Physical Therapy Rehab Treatment Note  [place] [facility]  [facility]: [email or web address]       NAME: Smooth Easley  : 1975 (49 y.o.)  MRN: [id number]  CODE STATUS: Full Code    Date of Service: 2025       Restrictions:  Restrictions/Precautions: Fall Risk, Modified Diet       SUBJECTIVE:   Subjective: \"Just my arm\"    Pain  Pain: 7/10 L arm pain pre/post session- received meds      OBJECTIVE:     Bed Mobility  Overall Assistance Level: Maximum Assistance  Additional Factors: Verbal cues;Increased time to complete;Head of bed flat;Without handrails  Roll Left  Assistance Level: Moderate assistance  Skilled Clinical Factors: vc's for technique and sequencing.  Roll Right  Assistance Level: Moderate assistance  Skilled Clinical Factors: increased difficulty d/t flaccid LUE, hand over hand assist to complete full roll  Sit to Supine  Assistance Level: Moderate assistance  Skilled Clinical Factors: assist at BLE, cues for R elbow lean for improved trunk positioning with return to supine  Supine to Sit  Assistance Level: Moderate assistance  Skilled Clinical Factors: assist at trunk and LLE  Scooting  Assistance Level: Moderate assistance  Skilled Clinical Factors: scooting towards EOB, assist needed with LLE    Transfers  Surface: Wheelchair;From bed;To bed  Additional Factors: Verbal cues;Hand placement cues;Increased time to complete  Device: Cane (// bars, LBQC)  Sit to Stand  Assistance Level: Minimal assistance  Skilled Clinical Factors: assist to position LLE on tremaine steady, good use of RUE to assist to standing with only slight lifting assist needed from therapist  Stand to Sit  Assistance Level: Minimal assistance  Skilled Clinical Factors: Improving eccentric control and RUE support when descending  Bed To/From Chair  Technique: Stand pivot  Assistance Level: Maximum assistance  Skilled Clinical Factors: wc to bed, completed towards strong side, Better awareness and ability with put weight through 
Physical Therapy Rehab Treatment Note  [facility] [place] [facility]  [facility]       NAME: Smooth Easley  : 1975 (49 y.o.)  MRN: [id number]  CODE STATUS: Full Code    Date of Service: 2025       Restrictions:  Restrictions/Precautions: Fall Risk, Modified Diet       SUBJECTIVE:   Subjective: \"I forgot I was coming here\"    Pain  Pain: denies pain pre/post session      OBJECTIVE:     Bed Mobility  Overall Assistance Level: Maximum Assistance  Additional Factors: Verbal cues;Increased time to complete;Head of bed flat;Without handrails  Roll Left  Assistance Level: Moderate assistance  Skilled Clinical Factors: vc's for technique and sequencing.  Roll Right  Assistance Level: Moderate assistance  Skilled Clinical Factors: increased difficulty d/t flaccid LUE, hand over hand assist to complete full roll  Sit to Supine  Assistance Level: Moderate assistance  Skilled Clinical Factors: assist at BLE, cues for R elbow lean for improved trunk positioning with return to supine  Supine to Sit  Assistance Level: Moderate assistance  Skilled Clinical Factors: assist at trunk and LLE  Scooting  Assistance Level: Moderate assistance  Skilled Clinical Factors: scooting towards EOB    Transfers  Surface: Wheelchair;To bed  Additional Factors: Verbal cues;Hand placement cues;Increased time to complete  Device:  (// bars)  Sit to Stand  Assistance Level: Moderate assistance  Skilled Clinical Factors: Marylou to block LLE in extension when performing. Good use of RUE to assist to standing. completed x3 this date with focus on L quad stability. completed x3 once in // bars, once at maddie rail, once at LBQC  Stand to Sit  Assistance Level: Minimal assistance  Skilled Clinical Factors: Improving eccentric control and RUE support when descending ; completed x3  Bed To/From Chair  Technique: Stand pivot  Assistance Level: Maximum assistance  Skilled Clinical Factors: wc to bed, completed towards strong side, Better 
Physical Therapy Rehab Treatment Note  Facility/Department: [facility] [place] [facility]  Room: R254/R254-01       NAME: Smooth Easley  : 1975 (49 y.o.)  MRN: [id number]  CODE STATUS: Full Code    Date of Service: 2025       Restrictions:  Restrictions/Precautions: Fall Risk, Modified Diet       SUBJECTIVE:   Subjective: \"I slept in the recliner\"    Pain  Pain: denies pain pre/post session      OBJECTIVE:     Transfers  Surface: Wheelchair  Additional Factors: Verbal cues;Hand placement cues;Increased time to complete  Device:  (// bars)  Sit to Stand  Assistance Level: Moderate assistance;Minimal assistance  Skilled Clinical Factors: ModA to block LLE in extension when performing. Good use of RUE to assist to standing.  Stand to Sit  Assistance Level: Moderate assistance;Minimal assistance  Skilled Clinical Factors: Improving eccentric control and RUE support when descending ; completed x3    Ambulation  Surface: Level surface  Device: Parallel Bars  Distance: 8'  Activity: Within Unit  Activity Comments: +2 for WC follow  Additional Factors: Verbal cues;Hand placement cues;Increased time to complete  Assistance Level: Moderate assistance;Maximum assistance  Skilled Clinical Factors: assist for weight shifting and RLE advancement, knee block required in stance phase to prevent buckling with cues for slow controlled movements d/t pt moving impulsively. fatigues quickly with short distances, +2 for WC follow needed but no hand on assist provided throughout distance.    Wheelchair  Surface: Level surface  Device: Standard wheelchair  Additional Factors: Verbal cues;Hand placement cues;Increased time to complete  Assistance Required to Manage Parts: All wheelchair parts  Assistance Level for Propulsion: Minimal assistance  Propulsion Method: Right upper extremity;Right lower extremity  Propulsion Quality: Slow velocity;Short strokes;Veers left;Decreased fluidity  Propulsion Distance: 100'  Skilled Clinical Factors: slight 
Physical Therapy Rehab Treatment Note  [facility]Department[facility] [place] [facility]  [facility]       NAME: Smooth Easley  : 1975 (49 y.o.)  MRN: [id number]  CODE STATUS: Full Code    Date of Service: 7/15/2025       Restrictions:  Restrictions/Precautions: Fall Risk       SUBJECTIVE:   Subjective: \"I am doing better\"    Pain  Pain: 4/10 pain in LUE pre/post session- RN notified      OBJECTIVE:     Bed Mobility  Overall Assistance Level: Moderate Assistance  Additional Factors: Verbal cues;Increased time to complete;Head of bed flat;With handrails  Roll Left  Assistance Level: Stand by assist  Skilled Clinical Factors: verbal cues for technique completed with and without bedrails  Roll Right  Assistance Level: Stand by assist;Minimal assistance  Skilled Clinical Factors: SBA with use of bedrail. Marylou without  Sit to Supine  Assistance Level: Minimal assistance  Skilled Clinical Factors: Assist LLE into bed  Supine to Sit  Assistance Level: Minimal assistance  Skilled Clinical Factors: assist at LLE to come to EOB  Scooting  Assistance Level: Minimal assistance  Skilled Clinical Factors: Assist with LE placement    Transfers  Surface: Wheelchair;Standard toilet;To bed  Additional Factors: Verbal cues;Hand placement cues;Increased time to complete  Device: Cane  Sit to Stand  Assistance Level: Moderate assistance  Skilled Clinical Factors: left knee and ankle wrapped with ace bandage for support and assistance.  Stand to Sit  Assistance Level: Moderate assistance  Skilled Clinical Factors: improving eccentric control and RUE support when descending  Stand Pivot  Assistance Level: Moderate assistance  Skilled Clinical Factors: face to face transfer, wc to bed, toilet to wc and wc to toilet, completed towards weak side, pt initially impulsive and requires cues for safety and improved technique    Ambulation  Surface: Level surface  Device: Quad Cane  Distance: 15', 7'  Activity: Within Unit  Activity Comments: +2 for 
Physical Therapy Rehab Treatment Note  [facility]  [facility]       NAME: Smooth Easley  : 1975 (49 y.o.)  MRN: [id number]  CODE STATUS: Full Code    Date of Service: 2025  Chart Reviewed: Yes  Family/Caregiver Present: No    Restrictions:  Restrictions/Precautions: Fall Risk, Modified Diet       SUBJECTIVE:   Subjective: \"I'm looking fwd to getting home\"  Subjective: \"I slept so good\"    Pain  Pain: denies pain this am.      OBJECTIVE:   Orientation  Overall Orientation Status: Within Functional Limits  Cognition  Overall Cognitive Status: Exceptions  Arousal/Alertness: Appears intact  Following Commands: Follows one step commands consistently  Attention Span: Appears intact  Memory: Decreased short term memory;Decreased recall of recent events  Safety Judgement: Decreased awareness of need for assistance;Decreased awareness of need for safety  Problem Solving: Assistance required to identify errors made;Assistance required to implement solutions;Assistance required to correct errors made;Assistance required to generate solutions  Insights: Decreased awareness of deficits  Initiation: Requires cues for some  Sequencing: Requires cues for some              Transfers  Surface: From mat;To mat;Wheelchair  Additional Factors: Verbal cues;Hand placement cues;Increased time to complete  Sit to Stand  Assistance Level: Moderate assistance;Maximum assistance  Skilled Clinical Factors: left knee and ankle wrappd with ace bandage for support and assistance.  Stand to Sit  Assistance Level: Moderate assistance  Skilled Clinical Factors: Improving eccentric control and RUE support when descending  Bed To/From Chair  Technique: Sit pivot  Assistance Level: Moderate assistance;Maximum assistance  Skilled Clinical Factors: lots of hip support and weight shifting provident during transfer  Stand Pivot  Assistance Level: Moderate assistance                   Neuromuscular 
Physical Therapy Rehab Treatment Note  [place] [facility]  [facility]       NAME: Smooth Easley  : 1975 (49 y.o.)  MRN: [id number]  CODE STATUS: Full Code    Date of Service: 2025       Restrictions:  Restrictions/Precautions: Fall Risk, Modified Diet       SUBJECTIVE:   Subjective: \"I had a horrible night\"    Pain  Pain: denies pain pre/post session      OBJECTIVE:     Transfers  Surface: Wheelchair;To mat;From mat  Additional Factors: Verbal cues;Hand placement cues;Increased time to complete  Device: Cane  Sit to Stand  Assistance Level: Minimal assistance;Moderate assistance  Skilled Clinical Factors: assist to position LLE , good use of RUE to assist to standing, increased assistance was needed to stabilize as she fatigued toward end of session  Stand to Sit  Assistance Level: Minimal assistance  Skilled Clinical Factors: Improving eccentric control and RUE support when descending  Sliding Board  Assistance Level: Minimal assistance  Skilled Clinical Factors: wc to mat and mat to wc, completed towards strong side, Better awareness and ability with put weight through LLE to advance along slideboard.    Ambulation  Surface: Level surface  Device: Quad Cane (LBQC)  Distance: 8'  Activity: Within Unit  Activity Comments: +2 for WC follow  Additional Factors: Verbal cues;Hand placement cues;Increased time to complete  Assistance Level: Moderate assistance  Gait Deviations: Slow kenny;Narrow base of support;Unsteady gait;Path deviations  Skilled Clinical Factors: ModA required from therapist for weight shifting and LLE advancement, increased cues for trunk control and glute engagement in stance phase. knee block required at LLE to prevent buckling but improved engagement as opposed to previous sessions. +2 only for WC follow and safety. L ACE wrap and dorsiflex wrap applied.      PT Exercises  Dynamic Standing Balance Exercises: face to face standing weight shifts with focus on 
Physical Therapy Rehab Treatment Note  [facility]  [facility]       NAME: Smooth Easley  : 1975 (49 y.o.)  MRN: [id number]  CODE STATUS: Full Code    Date of Service: 2025       Restrictions:  Restrictions/Precautions: Fall Risk, Modified Diet       SUBJECTIVE:   Subjective: \"I want to go outside\"    Pain  Pain: denies pain pre/post session      OBJECTIVE:     Bed Mobility  Overall Assistance Level: Maximum Assistance  Additional Factors: Verbal cues;Increased time to complete;Head of bed flat;Without handrails  Roll Left  Assistance Level: Moderate assistance  Skilled Clinical Factors: vc's for technique and sequencing.  Roll Right  Assistance Level: Moderate assistance  Skilled Clinical Factors: increased difficulty d/t flaccid LUE, hand over hand assist to complete full roll  Sit to Supine  Assistance Level: Moderate assistance  Skilled Clinical Factors: assist at BLE, cues for R elbow lean for improved trunk positioning with return to supine  Supine to Sit  Assistance Level: Moderate assistance  Skilled Clinical Factors: assist at trunk and LLE  Scooting  Assistance Level: Moderate assistance  Skilled Clinical Factors: scooting towards EOB, assist needed with LLE    Transfers  Surface: Wheelchair;To mat;From mat;To bed  Additional Factors: Verbal cues;Hand placement cues;Increased time to complete  Device: Cane (LBQC)  Sit to Stand  Assistance Level: Minimal assistance  Skilled Clinical Factors: assist to position LLE , good use of RUE to assist to standing with only slight lifting assist needed from therapist  Stand to Sit  Assistance Level: Minimal assistance  Skilled Clinical Factors: Improving eccentric control and RUE support when descending  Bed To/From Chair  Technique: Stand pivot  Assistance Level: Moderate assistance  Skilled Clinical Factors: wc to bed, completed towards strong side, Better awareness and ability with put weight through LLE to advance RLE. Knee 
Physical Therapy Rehab Treatment Note  [place] [facility]  [facility]       NAME: Smooth Easley  : 1975 (49 y.o.)  MRN: [id number]  CODE STATUS: Full Code    Date of Service: 7/10/2025       Restrictions:  Restrictions/Precautions: Fall Risk, Modified Diet       SUBJECTIVE:   Subjective: \"I am doing okay\"    Pain  Pain: denies pain pre/post session      OBJECTIVE:     Bed Mobility  Overall Assistance Level: Moderate Assistance  Additional Factors: Verbal cues;Increased time to complete;Head of bed flat;With handrails  Roll Left  Assistance Level: Stand by assist  Skilled Clinical Factors: Verbal cues for technique completed with and without bedrails  Roll Right  Assistance Level: Stand by assist;Minimal assistance  Skilled Clinical Factors: SBA with use of bedrail. Marylou without  Sit to Supine  Assistance Level: Minimal assistance  Skilled Clinical Factors: Assist LLE into bed  Supine to Sit  Assistance Level: Minimal assistance  Skilled Clinical Factors: assist at LLE to come to EOB  Scooting  Assistance Level: Minimal assistance  Skilled Clinical Factors: Assist with LE placement    Transfers  Surface: Wheelchair;To mat;From mat;To bed  Additional Factors: Verbal cues;Hand placement cues;Increased time to complete  Device: Cane  Sit to Stand  Assistance Level: Minimal assistance;Moderate assistance  Skilled Clinical Factors: assist to position LLE , good use of RUE to assist to standing, increased assistance was needed to stabilize as she fatigued toward end of session  Stand to Sit  Assistance Level: Minimal assistance  Skilled Clinical Factors: Improving eccentric control and RUE support when descending  Sliding Board  Assistance Level: Minimal assistance  Skilled Clinical Factors: wc to mat and mat to wc, completed towards strong side, Better awareness and ability with put weight through LLE to advance along slideboard.    Ambulation  Surface: Level surface  Device: Quad Cane 
Physical Therapy Rehab Treatment Note  [facility]  [facility]       NAME: Smooth Easley  : 1975 (50 y.o.)  MRN: [id number]  CODE STATUS: Full Code    Date of Service: 2025       Restrictions:  Restrictions/Precautions: Fall Risk       SUBJECTIVE:   Subjective: \"Today is the day\"    Pain  Pain: 5/10 pain in LUE pre/post session- received meds      OBJECTIVE:     Bed Mobility  Overall Assistance Level: Moderate Assistance  Additional Factors: Verbal cues;Increased time to complete;Head of bed flat;With handrails  Roll Left  Assistance Level: Stand by assist  Skilled Clinical Factors: verbal cues for technique completed with and without bedrails  Roll Right  Assistance Level: Stand by assist;Minimal assistance  Skilled Clinical Factors: SBA with use of bedrail. Marylou without  Sit to Supine  Assistance Level: Minimal assistance  Skilled Clinical Factors: Assist LLE into bed  Supine to Sit  Assistance Level: Minimal assistance  Skilled Clinical Factors: assist at LLE to come to EOB  Scooting  Assistance Level: Minimal assistance  Skilled Clinical Factors: Assist with LE placement    Transfers  Surface: Wheelchair;From bed;Standard toilet  Additional Factors: Verbal cues;Hand placement cues;Increased time to complete  Device: Cane  Sit to Stand  Assistance Level: Moderate assistance  Skilled Clinical Factors: left knee and ankle wrapped with ace bandage for support and assistance.  Stand to Sit  Assistance Level: Moderate assistance  Skilled Clinical Factors: improving eccentric control and RUE support when descending  Stand Pivot  Assistance Level: Moderate assistance  Skilled Clinical Factors: face to face transfer, bed to wc, toilet to wc and wc to toilet, completed towards weak side, pt initially impulsive and requires cues for safety and improved technique, daughter completed x1  Car Transfer  Assistance Level: Moderate assistance  Skilled Clinical Factors: SPT wc to car, 
Physical Therapy Rehab Treatment Note  [place] [facility]  [facility]       NAME: Smooth Easley  : 1975 (49 y.o.)  MRN: [id number]  CODE STATUS: Full Code    Date of Service: 2025       Restrictions:  Restrictions/Precautions: Fall Risk, Modified Diet       SUBJECTIVE:   Subjective: \"I missed you\"    Pain  Pain: 10 LUE pain pre/post session- received meds      OBJECTIVE:     Bed Mobility  Additional Factors: Verbal cues;Increased time to complete;Head of bed flat;With handrails  Roll Left  Assistance Level: Stand by assist  Skilled Clinical Factors: verbal cues for technique completed with and without bedrails  Roll Right  Assistance Level: Stand by assist;Minimal assistance  Skilled Clinical Factors: SBA with use of bedrail. Marylou without  Sit to Supine  Assistance Level: Minimal assistance  Skilled Clinical Factors: Assist LLE into bed  Supine to Sit  Assistance Level: Minimal assistance  Skilled Clinical Factors: assist at LLE to come to EOB  Scooting  Assistance Level: Minimal assistance  Skilled Clinical Factors: Assist with LE placement    Transfers  Surface: From bed;Wheelchair  Additional Factors: Verbal cues;Hand placement cues;Increased time to complete  Device: Cane  Sit to Stand  Assistance Level: Moderate assistance  Skilled Clinical Factors: left knee and ankle wrapped with ace bandage for support and assistance.  Stand to Sit  Assistance Level: Moderate assistance  Skilled Clinical Factors: improving eccentric control and RUE support when descending  Stand Pivot  Assistance Level: Moderate assistance  Skilled Clinical Factors: face to face transfer, bed to , completed towards weak side, pt initially impulsive and requires cues for safety and improved technique,    Ambulation  Surface: Level surface  Device: Quad Cane  Distance: 10'  Activity: Within Unit  Activity Comments: +2 for WC follow only  Additional Factors: Verbal cues;Hand placement cues;Increased time to 
Physical Therapy Rehab Treatment Note  [place] [facility]  [facility]       NAME: Smooth Easley  : 1975 (49 y.o.)  MRN: [id number]  CODE STATUS: Full Code    Date of Service: 2025       Restrictions:  Restrictions/Precautions: Fall Risk, Modified Diet       SUBJECTIVE:   Subjective: \"I am doing okay\"    Pain  Pain: 7/10 L arm pain pre/post session- received meds      OBJECTIVE:     Transfers  Surface: Wheelchair  Additional Factors: Verbal cues;Hand placement cues;Increased time to complete  Device:  (// bars)  Sit to Stand  Assistance Level: Minimal assistance  Skilled Clinical Factors: assist to position LLE on tremaine steady, good use of RUE to assist to standing with only slight lifting assist needed from therapist  Stand to Sit  Assistance Level: Minimal assistance  Skilled Clinical Factors: Improving eccentric control and RUE support when descending    Ambulation  Surface: Level surface  Device: Parallel Bars  Distance: 8' in // bars  Activity: Within Unit  Additional Factors: Verbal cues;Hand placement cues;Increased time to complete  Assistance Level: Moderate assistance  Gait Deviations: Slow kenny;Narrow base of support;Unsteady gait;Path deviations  Skilled Clinical Factors: ModA of 1 in // bars to complete gait training with WC follow for safety but no hands on assist, assist for weight shifting and LLE advancement.    PT Exercises  A/AROM Exercises: seated RLE recruitment tasks with and without resistance x10 ea  Dynamic Standing Balance Exercises: standing weight shifts in // bars x10 ea direction, standing TKE with LLE in standing x10, standing single leg marching with RLE to focus on stance phase and WBing through LLE x10     Activity Tolerance  Activity Tolerance: Patient tolerated treatment well    ASSESSMENT/PROGRESS TOWARDS GOALS:   Assessment  Assessment: Pt pleasant and motivated this date, continued to focus on gait in // bars this date with no WC follow 
Progress Note  Date:2025       Room:[facility]/R254-01  Patient Name:Smooth Easley     YOB: 1975     Age:49 y.o.        Subjective    Subjective:  Symptoms:  No shortness of breath, malaise, cough, chest pain, weakness, headache, chest pressure, anorexia, diarrhea or anxiety.    Diet:  No nausea or vomiting.       Review of Systems   Respiratory:  Negative for cough and shortness of breath.    Cardiovascular:  Negative for chest pain.   Gastrointestinal:  Negative for anorexia, diarrhea, nausea and vomiting.   Neurological:  Negative for weakness.     Objective         Vitals Last 24 Hours:  TEMPERATURE:  Temp  Av.5 °F (36.4 °C)  Min: 97.5 °F (36.4 °C)  Max: 97.5 °F (36.4 °C)  RESPIRATIONS RANGE: Resp  Av.5  Min: 16  Max: 17  PULSE OXIMETRY RANGE: SpO2  Av.5 %  Min: 94 %  Max: 97 %  PULSE RANGE: Pulse  Av.3  Min: 60  Max: 72  BLOOD PRESSURE RANGE: Systolic (24hrs), Av , Min:133 , Max:149   ; Diastolic (24hrs), Av, Min:57, Max:65    I/O (24Hr):    Intake/Output Summary (Last 24 hours) at 2025 1103  Last data filed at 2025 0626  Gross per 24 hour   Intake 900 ml   Output 600 ml   Net 300 ml     Objective:  General Appearance:  Well-appearing, comfortable and in no acute distress.    Vital signs: (most recent): Blood pressure (!) 145/62, pulse 67, temperature 97.5 °F (36.4 °C), temperature source Oral, resp. rate 17, height 1.626 m (5' 4\"), weight 92.7 kg (204 lb 4.8 oz), SpO2 94%.    HEENT: Normal HEENT exam.    Lungs:  Normal effort.  She is not in respiratory distress.  No decreased breath sounds.    Heart: Normal rate.  S1 normal and S2 normal.    Abdomen: Bowel sounds are normal.   There is no epigastric area or suprapubic area tenderness.     Pulses: Distal pulses are intact.    Neurological: Patient is alert.    Pupils:  Pupils are equal, round, and reactive to light.    Skin:  Warm.      Labs/Imaging/Diagnostics    Labs:  CBC:No results for input(s): \"WBC\", 
Pt VS and assessment completed. Pt A&O x4, calm and cooperative. Pt with L sided weakness, minimal facial drooping. Pt tolerating diet, meds given per MAR. Pt with c/o pain or SOB. Bed locked and in low position. Call light and tray table within reach.      Electronically signed by Destini Rios RN on 6/22/2025 at 1:47 PM   
Pt assessment completed. Pt alert and oriented x4. Pt denied being in any pain. Pt's daughter reported to this nurse that mother had fallen prior to this nurses shift and hit their head contradicting what the mother reported. This nurse assessed the pt for any more wounds or trauma but saw none. Pts head was physically examined for any bleeding, bruising, or bumps. Pt repeated to this nurse that they were in 0/10 pain. Pt given medication per MAR. Pt denied having any other needs after assisting with bed pan and chaparro care. Call light within reach, bed locked, bed in lowest position, and bed alarm active. Pt encouraged to use their call light when in need. Nurse on prior shift reported the prior incident.   
Pt report received, assumed care of the pt.  Pt asleep in bed.  No S&S of distress noted.  Bed alarm on, safety maintained.   
Pt shift assessment completed. VSS. Pt A/O x4, denies pain or SOB this am. Pt family was in for family training with therapy today. Pt's daughter toileted and showered her during the training. Norco given for left arm pain at 1408. Medication was effective. No concerns at this time. Electronically signed by Malorie Galvez RN on 7/15/2025 at 4:48 PM    
Pt was given discharge instructions and verbalized an understanding. Pt aware that ambulance  is for 6 pm.  Electronically signed by Shalini Cole RN on 7/16/2025 at 4:28 PM   
Pt. Up on her wheel chair. Denies pain or discomfort. Visitor in room. Pt. Consumed 100% of lunch.   Pt. Telemetry was discontinued. Last report 90 SR.     
Shift assessment completed. Pt A/O x4, denies pain and SOB at this time. Pt currently in room eating lunch. Pt has no question or concerns at this time. Electronically signed by Malorie Galvez RN on 7/10/2025 at 12:05 PM   
Shift assessment completed. VSS, pt c/o headache 5/10, tylenol given. Pt's last BM was 7/6/2025. Educated on options to assist with bowels, pt is not interested in anything at this time. Encouraged to let this nurse know if she changes her mind. Electronically signed by Malorie Galvez RN on 7/11/2025 at 3:18 PM   
Spiritual Health History and Assessment/Progress Note  [facility]    Follow-up, Spiritual/Emotional Needs,  , Adjustment to illness, Life Adjustments,      Name: Smooth Easley MRN: [id number]    Age: 49 y.o.     Sex: female   Language: English   Congregation: Non-[employer]ars[employer]   Impaired mobility and activities of daily living     Date: 6/19/2025            Total Time Calculated: 20 min              Spiritual Assessment continued in [facility]        Referral/Consult From: Physician   Encounter Overview/Reason: Follow-up, Spiritual/Emotional Needs  Service Provided For: Patient    The  visited the pt during Spiritual Care Consult Rounds. The pt was reclining on the bed, the pt was open to having the  sit and visit, the pt reported she was doing okay, but was feeling a little down, as she has going through this setback for too long. The pt did shared her kallie in God, and her relationship with Jose Froylan as her Lord and Savior. The pt stated she would like recover quickly and get to go home. The pt reported she missed her children, and desires to go home. At the end of the visit, the pt requested for prayers to be offered for her healing. The  provided the supportive need the pt needed, was able to explore the pt's concerns and was able to meet the pt's spiritual needs active listening, theological reflection, and prayer for her needs. The pt seemed to be hopeful at the end of the visit. The  will continue to provide support as needed.       Kallie, Belief, Meaning:   Patient identifies as spiritual, is connected with a kallie tradition or spiritual practice, and has beliefs or practices that help with coping during difficult times  Family/Friends No family/friends present      Importance and Influence:  Patient has spiritual/personal beliefs that influence decisions regarding their health  Family/Friends No family/friends present    Community:  Patient is connected with a 
This RN agrees with LPN charting.   
This RN agrees with LPN charting.     
  ; Diastolic (24hrs), Av, Min:59, Max:77      PULSE OXIMETRY RANGE: SpO2  Av.7 %  Min: 94 %  Max: 96 %    I/O last 3 completed shifts:  In: 200 [P.O.:200]  Out: 1750 [Urine:1750]    General appearance: No apparent distress, appears stated age and cooperative.   Respiratory:  Normal respiratory effort. Clear to auscultation, bilaterally without Rales/Wheezes/Rhonchi.  Cardiovascular: Regular rate and rhythm with normal S1/S2 without murmurs, rubs or gallops.  Abdomen: Soft, non-tender, non-distended with normal bowel sounds.   Neurologic:  Neurovascularly intact without any focal sensory/motor deficits       Labs:     Recent Results (from the past 24 hours)   CBC with Auto Differential    Collection Time: 25  5:01 AM   Result Value Ref Range    WBC 8.1 4.8 - 10.8 K/uL    RBC 4.35 4.20 - 5.40 M/uL    Hemoglobin 13.5 12.0 - 16.0 g/dL    Hematocrit 39.6 37.0 - 47.0 %    MCV 91.0 79.4 - 94.8 fL    MCH 31.0 27.0 - 31.3 pg    MCHC 34.1 33.0 - 37.0 %    RDW 12.3 11.5 - 14.5 %    Platelets 392 130 - 400 K/uL    Neutrophils % 53.8 %    Lymphocytes % 31.2 %    Monocytes % 10.9 %    Eosinophils % 2.8 %    Basophils % 0.6 %    Neutrophils Absolute 4.4 1.4 - 6.5 K/uL    Lymphocytes Absolute 2.5 1.0 - 4.8 K/uL    Monocytes Absolute 0.9 (H) 0.2 - 0.8 K/uL    Eosinophils Absolute 0.2 0.0 - 0.7 K/uL    Basophils Absolute 0.1 0.0 - 0.2 K/uL   Basic Metabolic Panel    Collection Time: 25  5:01 AM   Result Value Ref Range    Sodium 135 135 - 144 mEq/L    Potassium 4.2 3.4 - 4.9 mEq/L    Chloride 99 95 - 107 mEq/L    CO2 28 20 - 31 mEq/L    Anion Gap 8 (L) 9 - 15 mEq/L    Glucose 107 (H) 70 - 99 mg/dL    BUN 11 6 - 20 mg/dL    Creatinine 0.54 0.50 - 0.90 mg/dL    Est, Glom Filt Rate >90.0 >60    Calcium 9.2 8.5 - 9.9 mg/dL           Assessment/Plan:    Active Hospital Problems    Diagnosis Date Noted    Oral thrush [B37.0] 2025    Grief [F43.21] 2025    MDD (major depressive disorder), recurrent episode, 
  Patient able to complete activity correctly highlighting 42/59 B's with max cues to complete the task. Patient initially only located 8 of the B's on the right side. Verbal cues provided included \"look for your hand and then look for more B's\" \"Look for my finger\" as therapist's finger was tapping gently on a letter B.   Patient noted to not complete activity row by row L -> R but in a random fashion.      Splinting  Type: Wrist cock up splint was fabricated for support and protection of the left wrist due to flaccid UE. Splint was checked after she wore it for 1/2 hour with one problem area noted and adjustment was made. Daughter and patient were educated in wear schedule of on all of  the time except for bathing and dressing. They were also educated that after discharge they can use thin socks with the toes removed to replace stockinette liners if needed . Patient was educated that wearing the splint while she is sleeping is recommended but if she cannot sleep with it on she should remove it because sleep is more important.  Splinting: Fabrication     PROM performed in the left UE with no ROM noted          Education:  Education  Education Given To: Patient  Education Provided Comments: purpose for splint and wearing schedule  Education Method: Verbal  Barriers to Learning: None  Education Outcome: Verbalized understanding    Equipment recommendations:  OT Equipment Recommendations  Other: Continue to assess      ASSESSMENT:  Assessment: Patient is noted to be making gains in ability to look to the left with fewer cues needed. Patient continues to sit at rest with her visual gaze to the right but is observed to compensate more easily and with fewer cues. Patient continues to be very motivated and daughter remains very supportive while remaining quiet and allowing patient to work  Activity Tolerance: Patient tolerated treatment well      PLAN OF CARE:  Balance training, Functional mobility training, 
2: Patient will demonstrate recommended swallow strategies for safe and efficient swallow at independent level.  Goal 3: goal met  Goal 4: Patient will complete oral motor ROM/strengthening/coordination exercises 10x/each in conjunction with NMES (e-stim) to left facial musculature (as able) to improve management of bolus during oral intake.  Not addressed   Goal 5: Pt will complete tongue press, tongue pull back, and effortful swallow exercises 10x/each (with Abilex device as able) with stand by cues in order to strengthen the muscles of the swallow to increase base of tongue/posterior wall approximation.  Pt completed x10 lingual press with use of Abilex and tactile/visual cueing for correct placement and completion.   Goal 6: Pt will complete falsetto & effortful pitch glide exercise 10x/each with min cues in order to strengthen the muscles of the swallow to increase airway protection.  Pt completed 2 sets of x5 falsetto pitch glides with models and min cues for correct production.     Treatment/Activity Tolerance:  Patient tolerated treatment well    Plan:  Continue per POC    Pain Assessment:  Patient does not c/o pain.    Pain Re-assessment:  Patient does not c/o pain.    Patient/Caregiver Education:  Patient educated on session and progression towards goals.  Patient stated verbal understanding of directions.    Safety Devices:  Call light within reach and Chair alarm in place      Dysphagia Outcome Severity Scale    SWALLOWING  Ratin      Speech Therapy Level of Assistance Scale    AUDITORY COMPREHENSION  Rating:  Modified Independent    VERBAL EXPRESSION  Rating:  Modified Independent-Supervised Assistance    MOTOR SPEECH  Rating: Modified Independent-Supervised Assistance        Therapy Time  SLP Individual Minutes  Time In: 1100  Time Out: 1130  Minutes: 30            Signature: Electronically signed by LUZ MARINA Garcia on 2025 at 11:38 AM        
Bed level - pt able to assist with breach position in bed  Putting On/Taking Off Footwear  Assistance Level: Dependent  Skilled Clinical Factors: Socks and shoes  Toileting  Skilled Clinical Factors: NT- however brief highly saturated of urine  Tub/Shower Transfers  Skilled Clinical Factors: bed level bathing completed       Roll Left  Assistance Level: Minimal assistance  Roll Right  Assistance Level: Maximum assistance  Supine to Sit  Assistance Level: Maximum assistance  Scooting  Assistance Level: Moderate assistance  Bed To/From Chair  Technique: Stand pivot  Assistance Level: Maximum assistance     Estim- targeting LUE digit and elbow flexion/extension x 15 minutes.   No adverse reactions noted. Pt with good tolerance and + reaction. Pt with good response. Demos some movement in all joints however remains weak. Guided movements completed during estim    Intensity: adjusted to setting 2-3 on NMES device  Duty duration:  On time 20 sec  Off time 20 sec  Frequency  55 Hz  Pulse width  160 uS    Patient engaged in RUE fine motor and visual perceptual/cognitive activity to increase [place] with ADL/IADL completion.     Coloring task- following directions color sheet placed at midline. Task continued from prev session     Pt challenged to independently follow directions and color work sheet accordingly. Good ability to correctly follow instructions with only 1 minor error noted. Pt able to attend to and color both sides of sheet    Education:  Education  Education Given To: Patient  Education Provided: Plan of Care;Role of Therapy;Safety;ADL Function;Precautions;Transfer Training  Education Method: Demonstration;Verbal  Education Outcome: Continued education needed    ASSESSMENT:  Activity Tolerance: Patient tolerated treatment well    Bed level ADL completed today. ADL able to be completed with assist of only 1 today. Extensive assist still needed d/t L sided weakness and poor overall balance. LUE remains 
Method: Right upper extremity;Right lower extremity  Propulsion Quality: Slow velocity;Short strokes;Veers left;Decreased fluidity  Propulsion Distance: 75'  Skilled Clinical Factors: slight improvement noted as opposed to previous session, pt able to complete with SBA 85% of time, 15% of time required Marylou from therapist for obstacle avoidment and environmental navigation.              Activity Tolerance  Activity Tolerance: Patient tolerated treatment well           ASSESSMENT/PROGRESS TOWARDS GOALS:   Assessment  Assessment: Patient remains motivated to progress gait this morning, completing multiple trials of short distances with seated rest breaks taken between each. She did require slightly increased assistance from therapists this session to improve weightshifting and placing LLE as compared to previous sessions.  Activity Tolerance: Patient tolerated treatment well    Goals:  Long Term Goals  Long Term Goal 1: Pt to complete bed mobility with SBA  Long Term Goal 2: Pt to complete transfers with SBA  Long Term Goal 3: Pt to ambulate 10-25ft with LRD and Marylou  Long Term Goal 4: Pt to maintain midline stand unsupport >/= 1 min with SBA  Long Term Goal 5: Pt will demonstrate w/c mobililty >/= 50ft SBA  Long term goal 6: Pt will demonstrate 1 step with 1 rail mod A  Patient Goals   Patient Goals : \"walk\"    PLAN OF CARE/Safety:   Safety Devices  Type of Devices: All fall risk precautions in place;Chair alarm in place;Left in chair      Therapy Time:   Individual   Time In 0900   Time Out 0930   Minutes 30      Minutes: 30  Transfer/Bed mobility trainin  Gait trainin  Wheelchair trainin  Neuro re education: 0  Therapeutic ex: 0      LEROY ALMODOVAR PTA, 25 at 10:19 AM             
TOWARDS GOALS:  Assessment  Assessment: Focus of PM session completing seated balance activities for improved core recruitment and seated balance throughout tasks. Pt with improving ability to maintain balance, less frequent LOB noted and able to maintain with SBA for various times throughout session.  Activity Tolerance: Patient tolerated treatment well    Goals:  Long Term Goals  Long Term Goal 1: Pt to complete bed mobility with SBA  Long Term Goal 2: Pt to complete transfers with SBA  Long Term Goal 3: Pt to ambulate 10-25ft with LRD and Marylou  Long Term Goal 4: Pt to maintain midline stand unsupport >/= 1 min with SBA  Long Term Goal 5: Pt will demonstrate w/c mobililty >/= 50ft SBA  Long term goal 6: Pt will demonstrate 1 step with 1 rail mod A  Patient Goals   Patient Goals : \"walk\"    PLAN OF CARE/Safety:   Safety Devices  Type of Devices: All fall risk precautions in place;Chair alarm in place;Left in chair;Call light within reach      Therapy Time:   Individual   Time In 1430   Time Out 1515   Minutes 45      Minutes: 45  Transfer/Bed mobility training: 15  Gait training:  Neuro re education:  Therapeutic ex: 30      Rajiv Boston PTA, 06/24/25 at 3:24 PM             
along slideboard.    Ambulation  Surface: Level surface  Device: Quad Cane  Distance: 3', 12'  Activity: Within Unit  Activity Comments: +2 for WC follow only  Additional Factors: Verbal cues;Hand placement cues;Increased time to complete  Assistance Level: Moderate assistance  Gait Deviations: Slow kenny;Narrow base of support;Unsteady gait;Path deviations  Skilled Clinical Factors: Therapist on stool in front of pt providing assist for weight shifting and LLE advancement, requires cues for trunk stability and postural control, knee block required in stance phase of gait to prevent buckling. Pt able to advance LBQC as well as RLE. L ACE wrap to knee and dorsiflex wrap to ankle applied.    Wheelchair  Surface: Level surface;Uneven surface  Device: Standard wheelchair  Additional Factors: Verbal cues;Hand placement cues;Increased time to complete  Assistance Required to Manage Parts: All wheelchair parts  Assistance Level for Propulsion: Stand by assist;Minimal assistance  Propulsion Method: Right upper extremity;Right lower extremity  Propulsion Quality: Slow velocity;Short strokes;Veers left;Decreased fluidity  Propulsion Distance: 75'  Skilled Clinical Factors: slight improvement noted as opposed to previous session, pt able to complete with SBA 85% of time, 15% of time required Marylou from therapist for obstacle avoidment and environmental navigation.    PT Exercises  Dynamic Sitting Balance Exercises: seated anterior/posterior core rocking, lateral elbow taps with focus on core recruitment with all tasks  Dynamic Standing Balance Exercises: face to face standing weight shifts with focus on WBing through LLE, standing TKE in standing with LLE x10     Activity Tolerance  Activity Tolerance: Patient tolerated treatment well    ASSESSMENT/PROGRESS TOWARDS GOALS:   Assessment  Assessment: Good tolerance to session this PM, able to tolerate extended gait distance and complete slideboard transfer with Marylou. Good 
applied.    Activity Tolerance  Activity Tolerance: Patient tolerated treatment well    ASSESSMENT/PROGRESS TOWARDS GOALS:   Assessment  Assessment: Pt with good tolerance to session this date, reports feeling better as opposed to previous dates. Able to complete gait training this date, pt fatigues quickly with short distances requiring increased assist as pt grows fatigued. Introduced slideboard transfers and educated on benefits of equipment in preparation for discharge.  Activity Tolerance: Patient tolerated treatment well    Goals:  Long Term Goals  Long Term Goal 1: Pt to complete bed mobility with SBA  Long Term Goal 2: Pt to complete transfers with SBA  Long Term Goal 3: Pt to ambulate 10-25ft with LRD and Marylou  Long Term Goal 4: Pt to maintain midline stand unsupport >/= 1 min with SBA  Long Term Goal 5: Pt will demonstrate w/c mobililty >/= 50ft SBA  Long term goal 6: Pt will demonstrate 1 step with 1 rail mod A  Patient Goals   Patient Goals : \"walk\"    PLAN OF CARE/Safety:   Safety Devices  Type of Devices: All fall risk precautions in place;Left in chair;Chair alarm in place;Call light within reach      Therapy Time:   Individual   Time In 0900   Time Out 0930   Minutes 30      Minutes: 30  Transfer/Bed mobility training: 15  Gait training: 15  Neuro re education:  Therapeutic ex:      Rajiv Boston PTA, 07/07/25 at 11:37 AM             
commands with repetition  Attention Span: Appears intact  Memory: Decreased short term memory  Safety Judgement: Decreased awareness of need for assistance;Decreased awareness of need for safety  Problem Solving: Assistance required to identify errors made;Assistance required to implement solutions;Assistance required to correct errors made;Assistance required to generate solutions  Insights: Decreased awareness of deficits  Initiation: Requires cues for some  Sequencing: Requires cues for some      Pt's current cognitive status is:  Comprehension: Supervision  Expression: Supervision  Social Interaction: Independent  Problem Solving: Supervision  Memory: Supervision    OBJECTIVE:       ADL  Feeding  Assistance Level: Set-up  Skilled Clinical Factors: pt provided with thickened coffee placed in her R visual field  Grooming/Oral Hygiene  Assistance Level: Minimal assistance  Skilled Clinical Factors: Seated in w/c at sink. Min A to center pt in w/c d/t L-sided lean. Assist to remove toothbrush from [employer] [place] and apply toothpaste to toothbrush.  Upper Extremity Bathing  Assistance Level: Minimal assistance  Skilled Clinical Factors: seated in w/c at sink. Assist to maintain midline while seated in w/c.  Lower Extremity Bathing  Assistance Level: Maximum assistance  Skilled Clinical Factors: Bed level. Pt bathed anterior chaparro area and RLE/foot. Total A to bathe posterior chaparro area and LLE.  Upper Extremity Dressing  Assistance Level: Maximum assistance  Skilled Clinical Factors: Donning long-sleeved shirt: Total A to thread LUE through shirt sleeve, Min A to thread RUE through shirt sleeve, IND to pull shirt over head, Max A to pull shirt down over breasts/back.  Lower Extremity Dressing  Assistance Level: Dependent;Requires x 2 assistance  Skilled Clinical Factors: Completed in bed. Pt able to minimally bridge while underwear w/ pad and pajama pants donned.  Putting On/Taking Off Footwear  Assistance Level: 
multiple bouts of gait training this session as opposed to previous sessions. Initially less assist required to complete gait, however, pt grew fatigued with increased distance and multiple trials and demonstrated buckling at end of distance requiring increased assist to return to seated.  Activity Tolerance: Patient tolerated treatment well    Goals:  Long Term Goals  Long Term Goal 1: Pt to complete bed mobility with SBA  Long Term Goal 2: Pt to complete transfers with SBA  Long Term Goal 3: Pt to ambulate 10-25ft with LRD and Marylou  Long Term Goal 4: Pt to maintain midline stand unsupport >/= 1 min with SBA  Long Term Goal 5: Pt will demonstrate w/c mobililty >/= 50ft SBA  Long term goal 6: Pt will demonstrate 1 step with 1 rail mod A  Patient Goals   Patient Goals : \"walk\"    PLAN OF CARE/Safety:   Safety Devices  Type of Devices: All fall risk precautions in place;Call light within reach;Chair alarm in place;Left in chair      Therapy Time:   Individual   Time In 1000   Time Out 1030   Minutes 30      Minutes: 30  Transfer/Bed mobility training: 10  Gait trainin  Neuro re education:  Therapeutic ex:      Rajiv Boston PTA, 07/10/25 at 11:41 AM             
normal bowel sounds.  Musculoskeletal: No clubbing, cyanosis or edema bilaterally.  Full range of motion without deformity.  Skin: Skin color, texture, turgor normal.  No rashes or lesions.  Neurologic:  Neurovascularly intact without any focal sensory/motor deficits. Cranial nerves: II-XII intact, grossly non-focal.  Psychiatric: Alert and oriented, thought content appropriate, normal insight  Capillary Refill: Brisk,< 3 seconds   Peripheral Pulses: +2 palpable, equal bilaterally       Labs:   Recent Labs     07/07/25  0521   WBC 9.0   HGB 14.1   HCT 41.7   *     Recent Labs     07/07/25  0521      K 3.5   CL 99   CO2 31   BUN 17   CREATININE 0.68   CALCIUM 9.3     No results for input(s): \"AST\", \"ALT\", \"BILIDIR\", \"BILITOT\", \"ALKPHOS\" in the last 72 hours.  No results for input(s): \"INR\" in the last 72 hours.  No results for input(s): \"CKTOTAL\", \"TROPONINI\" in the last 72 hours.    Urinalysis:      Lab Results   Component Value Date/Time    NITRU Negative 07/08/2025 10:16 AM    WBCUA 10-20 07/08/2025 10:16 AM    BACTERIA FEW 07/08/2025 10:16 AM    RBCUA 0-2 07/08/2025 10:16 AM    BLOODU Negative 07/08/2025 10:16 AM    GLUCOSEU Negative 07/08/2025 10:16 AM       Radiology:  CT ABDOMEN PELVIS W IV CONTRAST Additional Contrast? None   Final Result   No acute process or significant interval change.      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      Leiomyomatous uterus.      Hepatic hemangioma.         XR ABDOMEN (KUB) (SINGLE AP VIEW)   Final Result   Distended loops of both large and small bowel seen diffusely throughout the   abdomen with increased stool burden seen within the colon. Findings to   suggest ileus versus partial obstruction.                 Assessment/Plan:    Active Hospital Problems    Diagnosis Date Noted    Abdominal distention [R14.0] 07/04/2025    Oral thrush [B37.0] 06/24/2025    Grief [F43.21] 06/18/2025    MDD (major depressive disorder), recurrent episode, 
patient also complains of severely impaired mobility and activities of daily living.  Otherwise no new problems with vision, hearing, nose, mouth, throat, dermal, cardiovascular, GI, , pulmonary, musculoskeletal, psychiatric or neurological. See Rehab H&P on Rehab chart dated .       Vital signs:  /86   Pulse 76   Temp 98.2 °F (36.8 °C) (Oral)   Resp 17   Ht 1.626 m (5' 4\")   Wt 94.9 kg (209 lb 4.8 oz)   SpO2 96%   BMI 35.93 kg/m²   I/O:   PO/Intake:  fair PO intake, no problems observed or reported-upgraded to minced with moist no straws thin liquid    Bowel/Bladder:  continent, constipation and urinary urgency.  General:  Patient is well developed, adequately nourished, non-obese and     well kempt.     HEENT:    PERRLA, hearing intact to loud voice, external inspection of ear     and nose benign.  Inspection of lips, tongue and gums    Musculoskeletal: No significant change in strength or tone.  All joints stable.      Inspection and palpation of digits and nails show no clubbing,       cyanosis or inflammatory conditions.   Neuro/Psychiatric: Affect: flat.  Alert and oriented to person, place and     situation.  No significant change in deep tendon reflexes or     Sensation  - left  sided severe weakness left upper extremity--left neglect  Lungs:  Diminished, CTA-B. Respiration effort is normal at rest.     Heart:   S1 = S2, RRR.  No loud murmurs.    Abdomen:  Soft, non-tender, no enlargement of liver or spleen.  Extremities:  Trace lower extremity edema,    tenderness.  Skin:   Intact to general survey, no visualized or palpated problems  .    Rehabilitation:  Physical therapy: FIMS:  Bed Mobility:      Transfers: Sit to Stand: Substantial/Maximal assistance  Stand to Sit: Substantial/Maximal assistance  Bed to Chair: Substantial/Maximal assistance (toward the R; TD toward the L), Ambulation  Comments: unsafe to assess due to increased assistance for SPT this date,      FIMS:  ,  , Assessment: Pt 
sips via straw with no anterior spillage and no overt s/s of aspiration on 8 oz.  Goal 2: Patient will demonstrate recommended swallow strategies for safe and efficient swallow at independent level.  Goal 3: Patient will tolerate trials of regular with adequate mastication and oral clearance with no overt s/s of difficulty or aspiration.  With e-stim in place, pt was given trial of chiqui crackers (2).  Pt required liquid to assist with bolus manipulation and cohesion for each trial.  Mild-moderate lingual residue post swallow noted on left side of tongue.  No residue on left lateral sulcus.  No overt s/s of aspiration.  Goal 4: Patient will complete oral motor ROM/strengthening/coordination exercises 10x/each in conjunction with NMES (e-stim) to left facial musculature (as able) to improve management of bolus during oral intake.  Patient received JASE/e-stim to the left facial musculature for 24 minutes, 30 Hz, 50 microseconds, 5 sec on/15 sec off duty cycle with the intensity of 10 while performing oral motor exercises 10x/each in front of mirror.  Pt reported she doesn't like the e-stim but understands the importance.  Educated pt will decrease frequency of e-stim in sessions.  Pt gave verbal understanding.  Goal 5: Pt will complete tongue press, tongue pull back, and effortful swallow exercises 10x/each (with Abilex device as able) with stand by cues in order to strengthen the muscles of the swallow to increase base of tongue/posterior wall approximation.  Pt performed lingual ROM/strength/coordination exercises ([place] around teeth, lateralization with resistance, elevation with tongue click) 10x/each.  Pt reports exercises are difficult.  Goal 6: Pt will complete falsetto & effortful pitch glide exercise 10x/each with min cues in order to strengthen the muscles of the swallow to increase airway protection.  Pt unable to achieve pitch inflection with trials of high to low pitch and low to high pitch, despite 
then taken out to the front of the hospital outside to get some fresh air. Discussed equipment for home with patient for discharge planning. Patient reported that she was getting a tremaine stedy. Discussed that patient may find it beneficial to use it for toileting as she will not have a wall mounted grab bar at home to use during pants management. Patient was encouraged to also use the sliding board for other transfers as she will engage more muscles. Patient reported agreement.     Patient was provided with a deck of cards and was asked to identify which cards were missing. Patient completed the activity with no apparent planning although she was able to identify that there were no queens at all easily. Patient then planned just to look at every card and remember what was there so she knew what was missing. Patient had trouble with planning the task. Following extended time where patient was given time to problem solve but with decreased planning ability she was encouraged to make piles for each number/face card so she could locate what was missing. Patient started to follow through with the plan but ran out of time     ASSESSMENT:   Patient tolerated well this am       PLAN OF CARE:  Balance training, Functional mobility training, Strengthening, Endurance training, Neuromuscular re-education, Cognitive reorientation, Pain management, Safety education & training, Equipment evaluation, education, & procurement, Patient/Caregiver education & training, Self-Care / ADL, Home management training, Cognitive/Perceptual training, Coordination training, Sensory integration, ROM  Continue OT POC until discharge    Patient goals : \"To get better to go home.\" patient reported that she would like her strength to get better  Time Frame for Long Term Goals : Within 4 weeks patient to demonstrate progress in the following areas in order to meet long term goals as stated in the initial evaluation.  Long Term Goal 1: Improve sitting 
therapist to assist with moving her buttocks to the side. Daughter reported that she was very nervous but did well. Daughter will benefit from additional practice.   Therapist answered all questions until patient and her family/friend were satisfied.          Sit to Stand  Assistance Level: Moderate assistance  Stand to Sit  Assistance Level: Moderate assistance    Patient was transferred to the mat and completed straight arm weight bearing on the left arm while reaching to the side to drop balls into a container and then reaching forward to drop them in. Patient tolerated the weight bearing well     Education:  Education  Education Given To: Patient;Family  Education Provided: Visual Perceptual Function;ADL Function;Transfer Training  Education Provided Comments: transfer ability  Education Method: Demonstration;Teach Back  Barriers to Learning: Cognition;Vision  Education Outcome: Continued education needed    Equipment recommendations:  OT Equipment Recommendations  Other: to be assessed      ASSESSMENT:  Assessment: family training was initiated on this date with need for continued education  Activity Tolerance: Patient tolerated treatment well      PLAN OF CARE:  Balance training, Functional mobility training, Strengthening, Endurance training, Neuromuscular re-education, Cognitive reorientation, Pain management, Safety education & training, Equipment evaluation, education, & procurement, Patient/Caregiver education & training, Self-Care / ADL, Home management training, Cognitive/Perceptual training, Coordination training, Sensory integration, ROM  Continue OT POC until discharge    Patient goals : \"To get better to go home.\" patient reported that she would like her strength to get better  Time Frame for Long Term Goals : Within 4 weeks patient to demonstrate progress in the following areas in order to meet long term goals as stated in the initial evaluation.  Long Term Goal 1: Improve sitting and standing 
well    Plan:  Continue per POC    Pain Assessment:  Patient c/o pain: Location and pain level: 6/10 low back  Patient reported pain meds recently received.    Pain Re-assessment:  Patient c/o pain: Described as same as above    Patient/Caregiver Education:  Patient educated on session and progression towards goals.  Patient stated verbal understanding of directions.    Safety Devices:  Call light within reach and Chair alarm in place      Speech Therapy Level of Assistance Scale    AUDITORY COMPREHENSION  Rating:  Modified Independent    VERBAL EXPRESSION  Rating:  Supervised Assistance    MOTOR SPEECH  Rating: Supervised Assistance    PROBLEM SOLVING  Rating: Minimal Assistance    MEMORY  Rating:  Modified Independent        Therapy Time  SLP Individual Minutes  Time In: 1120  Time Out: 1200  Minutes: 40   (Cognition: 30, speech: 10)     Session started late secondary to using bathroom.        Signature: Electronically signed by LUZ MARINA Ham on 6/27/2025 at 1:40 PM          
Co-Treat   Time In 1500       Time Out 1600         Minutes 60                   Neuromuscular reeducation: 30 minutes  Therapeutic activities: 20 minutes  Other (specify): 10 minutes splint adjustment     Electronically signed by:    THERESA Leslie/L,   7/1/2025, 4:14 PM               
Cognitive/Perceptual training, Coordination training, Sensory integration, ROM  Continue OT POC until discharge    Patient goals : \"To get better to go home.\" patient reported that she would like her strength to get better  Time Frame for Long Term Goals : Within 4 weeks patient to demonstrate progress in the following areas in order to meet long term goals as stated in the initial evaluation.  Long Term Goal 1: Improve sitting and standing balance  Long Term Goal 2: increase self care independence  Long Term Goal 3: improve visual perception and learn to employ compensatory techniques  Long Term Goal 4: increase IADL status    Therapy Time:   Individual Group Co-Treat   Time In 1330       Time Out 1430         Minutes 60             Neuromuscular reeducation: 45 minutes  Visual Perceptual training: 15 minutes     Electronically signed by:    Mauro Walter OT,   6/20/2025, 1:34 PM               
assistance        Splinting  Comment: Discussed wearing schedule for the splint with patient, daughter and friend Jarrod.  Discussed that the optimal schedule would be on at all times except to bathe, exercise. Other options would be to wear it only during the day, only during the night or not at all although patient had increased pain in her wrist when she was not wearing it              Education:  Education  Education Given To: Patient;Family;Caregiver  Education Provided: ADL Function;Transfer Training;Safety;Precautions;Family Education  Education Provided Comments: Patient's daughter who will be her primary caregiver and patient's friend who will also assist with her care post discharge were present for training. Daughter performed hands on care completing toilet transfer, toileting and bathing in the shower as well as dressing. Daughter was educated on letting her mom do as much as she can for herself as this is how she is going to continue to progress. Daughter was educated on where she should provide physical support to mom depending on the task she is completing. Educated daughter to always keep track of the position of the left foot before standing patient or transferring patient. Discussed that if a task starts going poorly daughter should have patient stop and sit down if she is standing in order to start again  Education Method: Verbal  Barriers to Learning: None  Education Outcome: Demonstrated understanding    Equipment recommendations:  OT Equipment Recommendations  Other: educated daughter in use of the sock aid      ASSESSMENT:  Assessment: Patient remains motivated to participate in therapy and improve in her ability to balance in order to increase independence  Activity Tolerance: Patient tolerated treatment well      PLAN OF CARE:  Balance training, Functional mobility training, Strengthening, Endurance training, Neuromuscular re-education, Cognitive reorientation, Pain management, Safety 
assistance for SPT this date,      FIMS:  ,  , Assessment: Blocked practice of wbing, swing and hip extension to improve gait mm initiation. Patient fatigued post session. Second person required for assist in mobility.    Occupational therapy: FIMS:   ,  , Assessment: Pt is a 49 year old woman from home who presents to [facility] with CVA symptoms. She demonstrates significant weakness and fatigue as well as L sided lack of attention and coordination. She is limited in her ability to scan to L side and is not aware of her deficits consistently. She would benefit from continued OT to maximize independence and safety with ADL tasks.    Speech therapy: FIMS:        Lab/X-ray studies reviewed, analyzed and discussed with patient and staff:   ONE SUPINE XRAY VIEW(S) OF THE ABDOMEN  7/4/2025   KUB reveals distended loops of both large and small bowel seen diffusely  throughout the abdomen.  There is increased stool burden seen within the  colon.  Findings to suggest ileus versus partial obstruction.  There is some  stool seen in the rectal vault.  No free intraperitoneal air.  Degenerative  changes seen within the spine with mild curvature with convexity to the right.     IMPRESSION:  Distended loops of both large and small bowel seen diffusely throughout the abdomen with increased stool burden seen within the colon. Findings to suggest ileus versus partial obstruction.       MRI of brain 6/11/2025  1. Large non-hemorrhagic acute anterior right MCA infarct. No mass effect or  midline shift.  2. Moderate white matter T2 signal abnormality consistent with the patient's  age.    CT OF THE ABDOMEN AND PELVIS WITH CONTRAST 7/4/2025    Lower Chest: Subsegmental atelectasis within the lung bases.  Heart is normal  in size.     Liver: Stable right hepatic lobe Hem angioma.     Gallbladder and biliary system: Postsurgical change of cholecystectomy.     Spleen: Unremarkable.     Pancreas: Unremarkable.     Adrenal glands: Unchanged 4.7 
education:  Therapeutic ex: 15      Rajiv Boston PTA, 07/09/25 at 3:26 PM             
neglect  Lungs:  Diminished, CTA-B. Respiration effort is normal at rest.     Heart:   S1 = S2, RRR.  No loud murmurs.    Abdomen:  Obese, distended with gas--Soft, non-tender, no enlargement of liver or spleen.  Extremities:  Trace lower extremity edema,    tenderness.  Resting hand splint left wrist  Skin:   Intact to general survey, no visualized or palpated problems.    Rehabilitation:  Physical therapy: FIMS:  Bed Mobility: Scooting: Partial/Moderate assistance    Transfers: Sit to Stand: Partial/Moderate assistance  Stand to Sit: Minimal Assistance  Bed to Chair: Partial/Moderate assistance, Minimal assistance, Ambulation  Comments: unsafe to assess due to increased assistance for SPT this date,      FIMS:  ,  , Assessment: Blocked practice of wbing, swing and hip extension to improve gait mm initiation. Patient fatigued post session. Second person required for assist in mobility.    Occupational therapy: FIMS:   ,  , Assessment: Pt is a 49 year old woman from home who presents to [facility] with CVA symptoms. She demonstrates significant weakness and fatigue as well as L sided lack of attention and coordination. She is limited in her ability to scan to L side and is not aware of her deficits consistently. She would benefit from continued OT to maximize independence and safety with ADL tasks.    Speech therapy: FIMS:        Lab/X-ray studies reviewed, analyzed and discussed with patient and staff:   ONE SUPINE XRAY VIEW(S) OF THE ABDOMEN  7/4/2025   KUB reveals distended loops of both large and small bowel seen diffusely  throughout the abdomen.  There is increased stool burden seen within the  colon.  Findings to suggest ileus versus partial obstruction.  There is some  stool seen in the rectal vault.  No free intraperitoneal air.  Degenerative  changes seen within the spine with mild curvature with convexity to the right.     IMPRESSION:  Distended loops of both large and small bowel seen diffusely throughout the 
provided visual feedback.    Goal 5: Patient will trial soft and bite sized and regular solids with adequate mastication and oral clearance in 10/10 trials.  Pt motivated to trial bread.  Will trial soft and bite size textures and bread this week.  Pt gave verbal understanding.  Goal 6: Patient will tolerate thermal gustatory stimulation to anterior faucial pillars to stimulate the swallow and improve swallow onset time, with prompt swallow initiation following stimulation on 4/5 trials.   GOAL 7. Pt will complete falsetto & effortful pitch glide exercises 10x/each with min cues in order to strengthen the muscles of the swallow to increase airway protection.  Pt performed chin tuck against resistance 10x/each x 2 sets with good effort and performance with min cues.      Treatment/Activity Tolerance:  Patient tolerated treatment well    Plan:  Continue per POC    Pain Assessment:  Patient c/o pain: Location and pain level: 6/10 head  Patient declined intervention.  Patient able to proceed with session.    Pain Re-assessment:  Patient c/o pain: Described as same as above    Patient/Caregiver Education:  Patient educated on session and progression towards goals.  Patient stated verbal understanding of directions.    Safety Devices:  Call light within reach and Chair alarm in place      Dysphagia Outcome Severity Scale    SWALLOWING  Ratin      Speech Therapy Level of Assistance Scale    AUDITORY COMPREHENSION  Rating:  Modified Independent    VERBAL EXPRESSION  Rating:  Supervised Assistance    MOTOR SPEECH  Rating: Supervised Assistance    PROBLEM SOLVING  Rating: Minimal Assistance    MEMORY  Rating:  Modified Independent        Therapy Time  SLP Individual Minutes  Time In: 1100  Time Out: 1150  Minutes: 50   (Speech: 20, cognition: 10, swallow: 20)         Signature: Electronically signed by LUZ MARINA Ham on 2025 at 1:35 PM          
activities in therapies, place, amount of time per day and intensity of therapy made daily.  In bed therapies or bedside therapies prn.   Bowel progressive constipation, and Bladder dysfunction monitoring neurogenic bladder:  frequent toileting, ambulate to bathroom with assistance, check post void residuals.  Check for C.difficile x1 if >2 loose stools in 24 hours, continue bowel & bladder program.  Monitor bowel and bladder function.  Lactinex 2 PO every AC.  MOM prn, Brown Bomb prn, Glycerin suppository prn, enema prn.  Moderate poststroke headache, cervical thoracic and low back pain as well as generalized OA pain: reassess pain every shift and prior to and after each therapy session, give prn Tylenol and consider scheduled Tylenol, modalities prn in therapy, Lidoderm, K-pad prn.  Titrate Fioricet as She continues to complain of headache we discussed using Fioricet.     Skin healing and breakdown risk:  continue pressure relief program.  Daily skin exams and reports from nursing.  Severe fatigue due to nutritional and hydration deficiency: Titrate vitamin B12 vitamin D and CoQ10 continue to monitor I&O’s, calorie counts prn, dietary consult prn.    healthy HS snack.  Acute episodic insomnia with situational adjustment disorder:  consider prn low dose Ambien, monitor for day time sedation.  Add HS \"Tuck In\"  Falls risk elevated:  patient to use call light to get nursing assistance to get up, bed and chair alarm.  Elevated DVT risk: progressive activities in PT, continue prophylaxis MARIKA hose, elevation and Lovenox.  Complex discharge planning: Discharge 7/12/2025 home with her daughter and home health care.  Until then continue weekly team meeting  every Monday to re-assess progress towards goals, discuss and address social, psychological and medical comorbidities and to address difficulties they may be having progressing in therapy.  Patient and family education is in progress.  The patient is to follow-up with 
exam?->CRC    FINDINGS:  INTRACRANIAL STRUCTURES/VENTRICLES: There is a large area of restricted  diffusion in the anterior right parietal lobe with matching increased T2  signal.  There is no hemorrhage or mass effect.  This is a large  non-hemorrhagic acute anterior right MCA infarct.  There is loss of  grey-white differentiation on the previous CT head in retrospect.    No mass effect or midline shift.    No evidence of an acute intracranial hemorrhage.    Total microhemorrhages: None.    Superficial siderosis: None.    The ventricles and sulci are normal in size and configuration.    There is moderate white matter T2 signal abnormality in the periventricular  and subcortical regions consistent with the patient's age.    The sellar/suprasellar regions appear unremarkable.    The normal signal voids within the major intracranial vessels appear  maintained.    ORBITS: The visualized portion of the orbits demonstrate no acute abnormality.    SINUSES: The visualized paranasal sinuses and mastoid air cells demonstrate  no acute abnormality.    BONES/SOFT TISSUES: The bone marrow signal intensity appears normal. The soft  tissues demonstrate no acute abnormality.    Impression  1. Large non-hemorrhagic acute anterior right MCA infarct. No mass effect or  midline shift.  2. Moderate white matter T2 signal abnormality consistent with the patient's  age.                            MRA of the Head and Neck: No results found for this or any previous visit.  No results found for this or any previous visit.  No results found for this or any previous visit.                            CT of the Head: Results for orders placed during the hospital encounter of 06/10/25    CT HEAD WO CONTRAST    Addendum 6/11/2025  1:24 AM  ADDENDUM:  Results were conveyed to Dr. Loyola via telephone at 1:01 a.m..    Narrative  EXAMINATION:  CT OF THE HEAD WITHOUT CONTRAST  6/11/2025 12:38 am    TECHNIQUE:  CT of the head was performed without the 
unremarkable. The stomach is unremarkable. Small bowel is unremarkable. Moderate to large amount fecal burden throughout the colon and rectum.  Appendix is not visualized, however no acute inflammatory changes within the right lower quadrant.     Aorta and major vessels: Severe atherosclerotic calcifications at the  infrarenal abdominal aorta without aneurysm.  Severe atherosclerotic  calcification at the bilateral common iliac arteries with severe stenosis of the right common iliac artery, unchanged from prior exam.   Mesentery and retroperitoneum: Unremarkable.   Lymph nodes: Unremarkable.   Ascites: None.   Bones/Soft Tissues: No aggressive bone lesions. No acute fracture or  malalignment.     IMPRESSION:  No acute process or significant interval change.   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   Leiomyomatous uterus.   Hepatic hemangioma.    Previous extensive, complex labs, notes and diagnostics reviewed and analyzed.     ALLERGIES:    Allergies as of 06/14/2025 - Reviewed 06/14/2025   Allergen Reaction Noted    Aspirin  03/05/2023      (please also verify by checking MAR)      Recently, I evaluated this patient for periodic reassessment of medical and functional status.  The patient was discussed in detail at the treatment team meeting focusing on current medical issues, progress in therapies, social issues, psychological issues, barriers to progress and strategies to address these barriers, and discharge planning.  See the hand written addendum to rehab progress note.  The patient continues to be high risk for future disability and their medical and rehabilitation prognosis continue to be good and therefore, we will continue the patient's rehabilitation course as planned.  The patient's tentative discharge date was set. Patient and family education was discussed.  The patient was made aware of the team discussion regarding their progress.  Discharge plans were discussed along with 
intensity of therapy made daily.  In bed therapies or bedside therapies prn.   Bowel progressive constipation, and Bladder dysfunction monitoring neurogenic bladder:  frequent toileting, ambulate to bathroom with assistance, check post void residuals.  Check for C.difficile x1 if >2 loose stools in 24 hours, continue bowel & bladder program.  Monitor bowel and bladder function.  Lactinex 2 PO every AC.  MOM prn, Brown Bomb prn, Glycerin suppository prn, enema prn.  Patient had a large BMx2 on the toilet   7/5  Moderate poststroke headache, LUE CVA pain, cervical thoracic and low back pain as well as generalized OA pain: reassess pain every shift and prior to and after each therapy session, give prn Tylenol and lowest effective dose of Percocet consider scheduled Tylenol, modalities prn in therapy, Lidoderm, K-pad prn.  Titrate Fioricet as She continues to complain of headache we discussed using Fioricet.   left upper extremity pain is improving with the higher dose of the Norco at 10 mg no signs of overuse abuse constipation or sedation.  Skin healing and breakdown risk:  continue pressure relief program.  Daily skin exams and reports from nursing.  Severe fatigue due to nutritional and hydration deficiency: Titrate vitamin B12 vitamin D and CoQ10 continue to monitor I&O’s, calorie counts prn, dietary consult prn.    healthy HS snack.  Acute episodic insomnia with situational adjustment disorder:  consider prn low dose Ambien, monitor for day time sedation.    HS \"Tuck In\"  Falls risk elevated:  patient to use call light to get nursing assistance to get up, bed and chair alarm.  Elevated DVT risk: progressive activities in PT, continue prophylaxis MARIKA hose, elevation and Lovenox.  Complex discharge planning: Discharge 7/16/2025 home with her daughter and home health care.   Progress toward her final weekly team meeting    Monday to re-assess progress towards goals, discuss and address social, psychological and 
medication with respect to increasing physical activity and exercise in PT, OT, ADLs with medication titration to lowest effective dosing.  Continue blood signs every shift focusing on heart rate, rhythm and blood pressure checks with orthostatic checks-monitoring the effect of exercise, therapy and posture.  Consult hospitalist for backup medical and adjust/add medications.  Monitor heart rate and blood pressure as well as medications effects on vital signs before during and after therapy with especial focus on preventing orthostasis and falls risk.  Okay to discontinue telemetry  Adjustment to disability deficits, grief, anxiety depression-emotional support provided daily, vitamin B12, encourage participation in rehabilitation support group and recreational therapy, adjust/add medications.  Consult psychology and psychiatry    Cerebrovascular accident (CVA)-Lg non-hemorrhagic acute R MCA infarct-Stroke Prophylaxis:  consult Neurology for follow up.  Diabetes: Monitor and control BSs, set BS goal less than 160.  Add Diabetic education and dietary education.  Hyperlipidemia:  Advise continued use of statins   Hypertension:  Encourage and educate re use of Blood Pressure Medicines, Patient was advised not to smoke.    Liver mass, right lobe, adrenal mass-heme-onc consult  Aphasia, mkysyhhlx-bpyweo-zteyiuaa pathology consult with recheck modified barium swallow as needed  sore throat  dt severe oral thrush -monitor for strep throat  Oropharyngeal dysphagia-okay to upgrade to soft with bite-size with thin liquids no straws okay for breads discussed with speech-language pathology  Oral thrush-Diflucan and nystatin      Focus on reassessing rehab goals and coordinating therapy and medical self care issues.  Work on functional DC goals across the team.  Continue e-stim and speech and OT          Sameera Ramirez D.O., PM&R     Attending    [phone]   [facility]    
reassess pain every shift and prior to and after each therapy session, give prn Tylenol and lowest effective dose of Percocet consider scheduled Tylenol, modalities prn in therapy, Lidoderm, K-pad prn.  Titrate Fioricet as She continues to complain of headache we discussed using Fioricet.   left upper extremity pain is improving with the higher dose of the Norco at 10 mg no signs of overuse abuse constipation or sedation.  Skin healing and breakdown risk:  continue pressure relief program.  Daily skin exams and reports from nursing.  Severe fatigue due to nutritional and hydration deficiency: Titrate vitamin B12 vitamin D and CoQ10 continue to monitor I&O’s, calorie counts prn, dietary consult prn.    healthy HS snack.  Acute episodic insomnia with situational adjustment disorder:  consider prn low dose Ambien, monitor for day time sedation.    HS \"Tuck In\"  Falls risk elevated:  patient to use call light to get nursing assistance to get up, bed and chair alarm.  Elevated DVT risk: progressive activities in PT, continue prophylaxis MARIKA hose, elevation and Lovenox.  Complex discharge planning: Discharge 7/16/2025 home with her daughter and home health care.   Progress toward her final weekly team meeting    Monday to re-assess progress towards goals, discuss and address social, psychological and medical comorbidities and to address difficulties they may be having progressing in therapy.  Patient and family education is in progress.  The patient is to follow-up with their family physician after discharge.      e-stim and speech and OT    Complex Active General Medical Issues that complicate care Assess & Plan:       Severe Hypertension-Acute rehab to monitor heart rate and rhythm with the option of telemetry and the effects of chronotropic medication with respect to increasing physical activity and exercise in PT, OT, ADLs with medication titration to lowest effective dosing.  Continue blood signs every shift focusing 
snack.  Acute episodic insomnia with situational adjustment disorder:  consider prn low dose Ambien, monitor for day time sedation.  Add HS \"Tuck In\"  Falls risk elevated:  patient to use call light to get nursing assistance to get up, bed and chair alarm.  Elevated DVT risk: progressive activities in PT, continue prophylaxis MARIKA hose, elevation and Lovenox.  Complex discharge planning: Discharge 7/12/2025 home with her daughter and home health care.  Until then continue weekly team meeting  every Monday to re-assess progress towards goals, discuss and address social, psychological and medical comorbidities and to address difficulties they may be having progressing in therapy.  Patient and family education is in progress.  The patient is to follow-up with their family physician after discharge.        Complex Active General Medical Issues that complicate care Assess & Plan:       Severe Hypertension-Acute rehab to monitor heart rate and rhythm with the option of telemetry and the effects of chronotropic medication with respect to increasing physical activity and exercise in PT, OT, ADLs with medication titration to lowest effective dosing.  Continue blood signs every shift focusing on heart rate, rhythm and blood pressure checks with orthostatic checks-monitoring the effect of exercise, therapy and posture.  Consult hospitalist for backup medical and adjust/add medications.  Monitor heart rate and blood pressure as well as medications effects on vital signs before during and after therapy with especial focus on preventing orthostasis and falls risk.  Okay to discontinue telemetry  Adjustment to disability deficits, grief, anxiety depression-emotional support provided daily, vitamin B12, encourage participation in rehabilitation support group and recreational therapy, adjust/add medications.  Consult psychology and psychiatry    Cerebrovascular accident (CVA)-Lg non-hemorrhagic acute R MCA infarct-Stroke Prophylaxis: 
psychiatry    Cerebrovascular accident (CVA)-Lg non-hemorrhagic acute R MCA infarct-Stroke Prophylaxis:  consult Neurology for follow up.  Diabetes: Monitor and control BSs, set BS goal less than 160.  Add Diabetic education and dietary education.  Hyperlipidemia:  Advise continued use of statins   Hypertension:  Encourage and educate re use of Blood Pressure Medicines, Patient was advised not to smoke.    Liver mass, right lobe, adrenal mass-heme-onc consult  Aphasia, [id number]-ukijmarx pathology consult with recheck modified barium swallow as needed      Focus on emotional health and caregiver involvement in care. Focus on LE strength and function.  Discontinue telemetry      MARCIE Ramirez D.O., PM&R     Attending    [phone]   [facility]    
food and drinks moved to the right side of the tray for her to locate them. Patient able to use utensils and drink from cups but her daughter fed her portions of the meal (06/15/25 1243)  Grooming: Minimal assistance (06/15/25 1243)  UE Bathing: Moderate assistance (06/15/25 1243)  LE Bathing: Maximum assistance (06/15/25 1243)  LE Bathing Skilled Clinical Factors: Patient able to wash her legs but required dependent assistance for chaparro areas and her feet (06/15/25 1243)  UE Dressing: Maximum assistance (06/15/25 1243)  LE Dressing: Dependent/Total (06/15/25 1243)  Putting On/Taking Off Footwear: Dependent/Total (06/15/25 1243)  Toileting Skilled Clinical Factors: NT - purewick was removed during the eval and patient declined a need to go (06/15/25 1243)  Product Used : Incontinent cleanser;Soap and water (07/06/25 4620)  Toilet Transfers  Toilet Transfers Comments: patient declined a need to go (06/15/25 1246)    Plans for addressing ADL deficits affecting: Focus on sequencing and energy conservation.                     SPEECH THERAPY/SLP     Comprehension: Within Functional Limits  Verbal Expression: Exceptions to functional limits (stand by cues for initiation or providing more details)    Plans for cognitive and communication issues affecting addressing:   Pt and Family training goals-  teach home tecnology options like Stephanie use and home assistive devices       Diet/Swallow:        Dysphagia Outcome Severity Scale: Level 6: Within functional limits/Modified independence    Compensatory Swallowing Strategies : Upright as possible for all oral intake, Small bites/sips, Eat/Feed slowly, Swallow 2 times per bite/sip, Check for pocketing of food on the Left  Therapeutic Interventions: Diet tolerance monitoring, Oral motor exercises, Patient/Family education, Pharyngeal exercises, Vital Stim/NMES, Therapeutic PO trials with SLP, Bolus control exercises          COGNITION  OT:   @FLOWTIME(304  SP:        Social 
side-to-side turns double sheet to the bed      Focus on endurance, activity pacing, reassessing rehab goals and discharge planning.  Also focus on energy conservation.      MARCIE Ramirez D.O., PM&R     Attending    [phone]   [facility]

## 2025-07-21 ENCOUNTER — TELEPHONE (OUTPATIENT)
Dept: INTERNAL MEDICINE | Age: 50
End: 2025-07-21

## 2025-07-21 NOTE — TELEPHONE ENCOUNTER
Marita with [facility] called, following up with appointment that was scheduled today with Malaika.  Appt was canceled b/c patient was unable to get out of the house.  They are getting a wheelchair ramp put in and it will take a couple of days for it to be finished.  Spoke to Leona TEIXEIRA for [employer] and got patient rescheduled with Dr Lopez on Friday 7/28 so there are no delays with getting her Home Health. Guidelines with Medicare state that they are unable to schedule HH f they have not been seen by a provider yet

## 2025-07-24 DIAGNOSIS — G89.0 THALAMIC PAIN SYNDROME: ICD-10-CM

## 2025-07-24 DIAGNOSIS — I63.9 CEREBROVASCULAR ACCIDENT (CVA), UNSPECIFIED MECHANISM (HCC): ICD-10-CM

## 2025-07-24 RX ORDER — HYDROCODONE BITARTRATE AND ACETAMINOPHEN 10; 325 MG/1; MG/1
1 TABLET ORAL EVERY 4 HOURS PRN
Qty: 30 TABLET | Refills: 0 | OUTPATIENT
Start: 2025-07-24 | End: 2025-07-31

## 2025-07-25 ENCOUNTER — OFFICE VISIT (OUTPATIENT)
Dept: FAMILY MEDICINE CLINIC | Age: 50
End: 2025-07-25

## 2025-07-25 VITALS
SYSTOLIC BLOOD PRESSURE: 158 MMHG | HEART RATE: 83 BPM | DIASTOLIC BLOOD PRESSURE: 92 MMHG | TEMPERATURE: 97 F | OXYGEN SATURATION: 93 %

## 2025-07-25 DIAGNOSIS — Z09 HOSPITAL DISCHARGE FOLLOW-UP: Primary | ICD-10-CM

## 2025-07-25 DIAGNOSIS — R52 PAIN AFTER CEREBROVASCULAR ACCIDENT (CVA): ICD-10-CM

## 2025-07-25 DIAGNOSIS — I63.9 CEREBROVASCULAR ACCIDENT (CVA), UNSPECIFIED MECHANISM (HCC): ICD-10-CM

## 2025-07-25 DIAGNOSIS — K59.00 CONSTIPATION, UNSPECIFIED CONSTIPATION TYPE: ICD-10-CM

## 2025-07-25 DIAGNOSIS — I69.398 PAIN AFTER CEREBROVASCULAR ACCIDENT (CVA): ICD-10-CM

## 2025-07-25 DIAGNOSIS — I87.8 VENOUS STASIS OF BOTH LOWER EXTREMITIES: ICD-10-CM

## 2025-07-25 DIAGNOSIS — I10 PRIMARY HYPERTENSION: ICD-10-CM

## 2025-07-25 RX ORDER — POLYETHYLENE GLYCOL 3350 17 G/17G
17 POWDER, FOR SOLUTION ORAL 2 TIMES DAILY PRN
Qty: 1530 G | Refills: 1 | Status: SHIPPED | OUTPATIENT
Start: 2025-07-25 | End: 2025-08-24

## 2025-07-25 RX ORDER — LOSARTAN POTASSIUM AND HYDROCHLOROTHIAZIDE 12.5; 5 MG/1; MG/1
1 TABLET ORAL DAILY
Qty: 90 TABLET | Refills: 1 | Status: SHIPPED | OUTPATIENT
Start: 2025-07-25

## 2025-07-25 RX ORDER — DOCUSATE SODIUM 100 MG/1
100 CAPSULE, LIQUID FILLED ORAL 2 TIMES DAILY
Qty: 60 CAPSULE | Refills: 5 | Status: SHIPPED | OUTPATIENT
Start: 2025-07-25 | End: 2026-01-21

## 2025-07-25 RX ORDER — HYDROCODONE BITARTRATE AND ACETAMINOPHEN 10; 325 MG/1; MG/1
1 TABLET ORAL EVERY 6 HOURS PRN
Qty: 28 TABLET | Refills: 0 | Status: SHIPPED | OUTPATIENT
Start: 2025-07-25 | End: 2025-08-01

## 2025-07-25 ASSESSMENT — PATIENT HEALTH QUESTIONNAIRE - PHQ9: DEPRESSION UNABLE TO ASSESS: URGENT/EMERGENT SITUATION

## 2025-07-25 NOTE — PROGRESS NOTES
[facility] PRIMARY CARE  [address] [place] [id number]  Dept: [phone]  Dept Fax: [phone]     Chief Complaint:  Chief Complaint   Patient presents with    Follow-Up from Hospital     6/10/2025 - 6/14/2025 (4 days), [facility], TIA, CVA, liver mass; transferred 6/14/2025 - 7/16/2025 (32 days), [facility]       Vitals:    07/25/25 1309 07/25/25 1340   BP: (!) 158/92 (!) 158/92   Pulse: 83    Temp: 97 °F (36.1 °C)    TempSrc: Infrared    SpO2: 93%        HPI:  50 y.o.female who presents for the following:      Hosp f/u: admitted for CVS with L sided weakness; after stabilization sent to acute rehab; meds now include plavix; still has LUE/LLE weakness and would like home PT; wheelchair bound; tolerating PO; given 30 tabs of 10-325mg hydrocodone/aceta for the LUE/LLE pain    Admit date:  6/10/2025                        Discharge date: 6/14/25   Hospital Course:   Smooth Easley is a 50 y.o. female who was admitted with left sided weakness.  Mri revealed large MCA stroke.  On plavix and statin, asa allergy.  Transferred to acute rehab for transition of care.   Pt was discharge in a stable condition.     -----------------------------------------------------------------------------    Assessment/Plan:  50 y.o. female here mainly for the following:  Hx of CVA  Ordered home health PT/OT  Discussed the importance of her meds  Placed referral to neuro  Sending to pain clinic  Refilled the 10mg norco along with a bowel regimen; discussed need to taper off this over time  HTN  Not at goal  Replace the HCTZ with losartan/HCTZ  We'll titrate again in a month  LE swelling  Compression stockings  Cut back on salt  Elevated when possible        ICD-10-CM    1. Hospital discharge follow-up  Z09 TX DISCHARGE MEDS RECONCILED W/ CURRENT OUTPATIENT MED LIST      2. Cerebrovascular accident (CVA), unspecified mechanism (HCC)  I63.9 Amb External Referral To Neurology

## 2025-08-05 DIAGNOSIS — G89.29 INSOMNIA SECONDARY TO CHRONIC PAIN: ICD-10-CM

## 2025-08-05 DIAGNOSIS — G47.01 INSOMNIA SECONDARY TO CHRONIC PAIN: ICD-10-CM

## 2025-08-06 RX ORDER — ATORVASTATIN CALCIUM 80 MG/1
80 TABLET, FILM COATED ORAL NIGHTLY
Qty: 30 TABLET | Refills: 3 | OUTPATIENT
Start: 2025-08-06

## 2025-08-06 RX ORDER — CHOLECALCIFEROL (VITAMIN D3) 50 MCG
1 TABLET ORAL
Qty: 60 TABLET | Refills: 3 | OUTPATIENT
Start: 2025-08-06

## 2025-08-06 RX ORDER — SERTRALINE HYDROCHLORIDE 25 MG/1
25 TABLET, FILM COATED ORAL DAILY
Qty: 30 TABLET | Refills: 3 | OUTPATIENT
Start: 2025-08-06

## 2025-08-06 RX ORDER — UBIDECARENONE 30 MG
1 CAPSULE ORAL DAILY
Qty: 120 CAPSULE | Refills: 3 | OUTPATIENT
Start: 2025-08-06

## 2025-08-06 RX ORDER — ZOLPIDEM TARTRATE 5 MG/1
5 TABLET ORAL NIGHTLY PRN
Qty: 7 TABLET | Refills: 0 | OUTPATIENT
Start: 2025-08-06 | End: 2025-08-13

## 2025-08-06 RX ORDER — LANOLIN ALCOHOL/MO/W.PET/CERES
400 CREAM (GRAM) TOPICAL DAILY
Qty: 30 TABLET | Refills: 3 | OUTPATIENT
Start: 2025-08-06

## 2025-08-07 ENCOUNTER — HOSPITAL ENCOUNTER (INPATIENT)
Age: 50
LOS: 2 days | Discharge: HOME HEALTH | End: 2025-08-09
Attending: EMERGENCY MEDICINE | Admitting: INTERNAL MEDICINE
Payer: MEDICAID

## 2025-08-07 DIAGNOSIS — E87.1 HYPONATREMIA: ICD-10-CM

## 2025-08-07 DIAGNOSIS — R11.2 NAUSEA VOMITING AND DIARRHEA: Primary | ICD-10-CM

## 2025-08-07 DIAGNOSIS — R19.7 NAUSEA VOMITING AND DIARRHEA: Primary | ICD-10-CM

## 2025-08-07 DIAGNOSIS — E87.6 HYPOKALEMIA: ICD-10-CM

## 2025-08-07 DIAGNOSIS — I63.9 CEREBROVASCULAR ACCIDENT (CVA), UNSPECIFIED MECHANISM (HCC): ICD-10-CM

## 2025-08-07 LAB
ALBUMIN SERPL-MCNC: 4.2 G/DL (ref 3.5–4.6)
ALP SERPL-CCNC: 103 U/L (ref 40–130)
ALT SERPL-CCNC: 24 U/L (ref 0–33)
ANION GAP SERPL CALCULATED.3IONS-SCNC: 14 MEQ/L (ref 9–15)
AST SERPL-CCNC: 18 U/L (ref 0–35)
BASOPHILS # BLD: 0 K/UL (ref 0–0.1)
BASOPHILS NFR BLD: 0.3 % (ref 0.1–1.2)
BILIRUB SERPL-MCNC: 0.8 MG/DL (ref 0.2–0.7)
BILIRUB UR QL STRIP: NEGATIVE
BUN SERPL-MCNC: 12 MG/DL (ref 6–20)
CALCIUM SERPL-MCNC: 9.5 MG/DL (ref 8.5–9.9)
CHLORIDE SERPL-SCNC: 84 MEQ/L (ref 95–107)
CLARITY UR: CLEAR
CO2 SERPL-SCNC: 28 MEQ/L (ref 20–31)
COLOR UR: YELLOW
CREAT SERPL-MCNC: 0.7 MG/DL (ref 0.5–0.9)
EOSINOPHIL # BLD: 0.3 K/UL (ref 0–0.4)
EOSINOPHIL NFR BLD: 3.2 % (ref 0.7–5.8)
EPI CELLS #/AREA URNS HPF: ABNORMAL /HPF
ERYTHROCYTE [DISTWIDTH] IN BLOOD BY AUTOMATED COUNT: 12.1 % (ref 11.7–14.4)
GLOBULIN SER CALC-MCNC: 2.2 G/DL (ref 2.3–3.5)
GLUCOSE SERPL-MCNC: 119 MG/DL (ref 70–99)
GLUCOSE UR STRIP-MCNC: NEGATIVE MG/DL
HCT VFR BLD AUTO: 40.3 % (ref 37–47)
HGB BLD-MCNC: 14.2 G/DL (ref 11.2–15.7)
HGB UR QL STRIP: ABNORMAL
HYALINE CASTS #/AREA URNS LPF: ABNORMAL /LPF (ref 0–5)
IMM GRANULOCYTES # BLD: 0.1 K/UL
IMM GRANULOCYTES NFR BLD: 0.6 %
INR PPP: 1
KETONES UR STRIP-MCNC: NEGATIVE MG/DL
LACTATE BLDV-SCNC: 2.1 MMOL/L (ref 0.5–2.2)
LEUKOCYTE ESTERASE UR QL STRIP: ABNORMAL
LIPASE SERPL-CCNC: 22 U/L (ref 12–95)
LYMPHOCYTES # BLD: 1.8 K/UL (ref 1.2–3.7)
LYMPHOCYTES NFR BLD: 17.7 %
MCH RBC QN AUTO: 30 PG (ref 25.6–32.2)
MCHC RBC AUTO-ENTMCNC: 35.2 % (ref 32.2–35.5)
MCV RBC AUTO: 85.2 FL (ref 79.4–94.8)
MONOCYTES # BLD: 1 K/UL (ref 0.2–0.9)
MONOCYTES NFR BLD: 9.9 % (ref 4.7–12.5)
MUCOUS THREADS URNS QL MICRO: PRESENT /LPF
NEUTROPHILS # BLD: 7.1 K/UL (ref 1.6–6.1)
NEUTS SEG NFR BLD: 68.3 % (ref 34–71.1)
NITRITE UR QL STRIP: NEGATIVE
PH UR STRIP: 7 [PH] (ref 5–9)
PLATELET # BLD AUTO: 408 K/UL (ref 182–369)
POTASSIUM SERPL-SCNC: 2.5 MEQ/L (ref 3.4–4.9)
PROT SERPL-MCNC: 6.4 G/DL (ref 6.3–8)
PROT UR STRIP-MCNC: 100 MG/DL
PROTHROMBIN TIME: 13.9 SEC (ref 12.3–14.9)
RBC # BLD AUTO: 4.73 M/UL (ref 3.93–5.22)
RBC #/AREA URNS HPF: ABNORMAL /HPF (ref 0–2)
SODIUM SERPL-SCNC: 126 MEQ/L (ref 135–144)
SP GR UR STRIP: 1.02 (ref 1–1.03)
TROPONIN, HIGH SENSITIVITY: 17 NG/L (ref 0–19)
UROBILINOGEN UR STRIP-ACNC: 0.2 E.U./DL
WBC # BLD AUTO: 10.3 K/UL (ref 4–10)
WBC #/AREA URNS HPF: ABNORMAL /HPF (ref 0–5)

## 2025-08-07 PROCEDURE — 6360000002 HC RX W HCPCS: Performed by: EMERGENCY MEDICINE

## 2025-08-07 PROCEDURE — 36415 COLL VENOUS BLD VENIPUNCTURE: CPT

## 2025-08-07 PROCEDURE — 83690 ASSAY OF LIPASE: CPT

## 2025-08-07 PROCEDURE — 99285 EMERGENCY DEPT VISIT HI MDM: CPT

## 2025-08-07 PROCEDURE — 84484 ASSAY OF TROPONIN QUANT: CPT

## 2025-08-07 PROCEDURE — 81001 URINALYSIS AUTO W/SCOPE: CPT

## 2025-08-07 PROCEDURE — 6370000000 HC RX 637 (ALT 250 FOR IP): Performed by: EMERGENCY MEDICINE

## 2025-08-07 PROCEDURE — 96376 TX/PRO/DX INJ SAME DRUG ADON: CPT

## 2025-08-07 PROCEDURE — 1210000000 HC MED SURG R&B

## 2025-08-07 PROCEDURE — 83605 ASSAY OF LACTIC ACID: CPT

## 2025-08-07 PROCEDURE — 85025 COMPLETE CBC W/AUTO DIFF WBC: CPT

## 2025-08-07 PROCEDURE — 80053 COMPREHEN METABOLIC PANEL: CPT

## 2025-08-07 PROCEDURE — 2580000003 HC RX 258: Performed by: EMERGENCY MEDICINE

## 2025-08-07 PROCEDURE — 96374 THER/PROPH/DIAG INJ IV PUSH: CPT

## 2025-08-07 PROCEDURE — 93005 ELECTROCARDIOGRAM TRACING: CPT

## 2025-08-07 PROCEDURE — 85610 PROTHROMBIN TIME: CPT

## 2025-08-07 RX ORDER — SODIUM CHLORIDE AND POTASSIUM CHLORIDE 300; 900 MG/100ML; MG/100ML
INJECTION, SOLUTION INTRAVENOUS CONTINUOUS
Status: DISCONTINUED | OUTPATIENT
Start: 2025-08-07 | End: 2025-08-08

## 2025-08-07 RX ORDER — HYDROCODONE BITARTRATE AND ACETAMINOPHEN 5; 325 MG/1; MG/1
1 TABLET ORAL ONCE
Status: COMPLETED | OUTPATIENT
Start: 2025-08-07 | End: 2025-08-07

## 2025-08-07 RX ORDER — ONDANSETRON 2 MG/ML
4 INJECTION INTRAMUSCULAR; INTRAVENOUS ONCE
Status: COMPLETED | OUTPATIENT
Start: 2025-08-07 | End: 2025-08-07

## 2025-08-07 RX ORDER — 0.9 % SODIUM CHLORIDE 0.9 %
1000 INTRAVENOUS SOLUTION INTRAVENOUS ONCE
Status: COMPLETED | OUTPATIENT
Start: 2025-08-07 | End: 2025-08-07

## 2025-08-07 RX ADMIN — ONDANSETRON 4 MG: 2 INJECTION, SOLUTION INTRAMUSCULAR; INTRAVENOUS at 22:18

## 2025-08-07 RX ADMIN — SODIUM CHLORIDE 1000 ML: 0.9 INJECTION, SOLUTION INTRAVENOUS at 20:44

## 2025-08-07 RX ADMIN — ONDANSETRON 4 MG: 2 INJECTION, SOLUTION INTRAMUSCULAR; INTRAVENOUS at 20:47

## 2025-08-07 RX ADMIN — HYDROCODONE BITARTRATE AND ACETAMINOPHEN 1 TABLET: 5; 325 TABLET ORAL at 22:18

## 2025-08-07 RX ADMIN — POTASSIUM CHLORIDE AND SODIUM CHLORIDE: 900; 300 INJECTION, SOLUTION INTRAVENOUS at 21:35

## 2025-08-07 RX ADMIN — POTASSIUM BICARBONATE 25 MEQ: 978 TABLET, EFFERVESCENT ORAL at 22:34

## 2025-08-07 ASSESSMENT — PAIN DESCRIPTION - ORIENTATION: ORIENTATION: LEFT

## 2025-08-07 ASSESSMENT — PAIN SCALES - GENERAL: PAINLEVEL_OUTOF10: 7

## 2025-08-07 ASSESSMENT — PAIN DESCRIPTION - LOCATION: LOCATION: HIP;SHOULDER

## 2025-08-07 ASSESSMENT — PAIN - FUNCTIONAL ASSESSMENT: PAIN_FUNCTIONAL_ASSESSMENT: NONE - DENIES PAIN

## 2025-08-07 ASSESSMENT — PAIN DESCRIPTION - DESCRIPTORS: DESCRIPTORS: ACHING

## 2025-08-08 LAB
ALBUMIN SERPL-MCNC: 3.7 G/DL (ref 3.5–4.6)
ALP SERPL-CCNC: 87 U/L (ref 40–130)
ALT SERPL-CCNC: 21 U/L (ref 0–33)
ANION GAP SERPL CALCULATED.3IONS-SCNC: 10 MEQ/L (ref 9–15)
AST SERPL-CCNC: 18 U/L (ref 0–35)
BASOPHILS # BLD: 0 K/UL (ref 0–0.1)
BASOPHILS NFR BLD: 0.5 % (ref 0.1–1.2)
BILIRUB DIRECT SERPL-MCNC: 0.4 MG/DL (ref 0–0.4)
BILIRUB INDIRECT SERPL-MCNC: 0.4 MG/DL (ref 0–0.6)
BILIRUB SERPL-MCNC: 0.8 MG/DL (ref 0.2–0.7)
BUN SERPL-MCNC: 10 MG/DL (ref 6–20)
CALCIUM SERPL-MCNC: 9.1 MG/DL (ref 8.5–9.9)
CHLORIDE SERPL-SCNC: 92 MEQ/L (ref 95–107)
CO2 SERPL-SCNC: 27 MEQ/L (ref 20–31)
CORTISOL COLLECTION INFO: NORMAL
CORTISOL: 9.6 UG/DL (ref 2.5–19.5)
CREAT SERPL-MCNC: 0.6 MG/DL (ref 0.5–0.9)
EKG ATRIAL RATE: 101 BPM
EKG ATRIAL RATE: 80 BPM
EKG DIAGNOSIS: NORMAL
EKG DIAGNOSIS: NORMAL
EKG P AXIS: 62 DEGREES
EKG P-R INTERVAL: 120 MS
EKG P-R INTERVAL: 124 MS
EKG Q-T INTERVAL: 396 MS
EKG Q-T INTERVAL: 406 MS
EKG QRS DURATION: 86 MS
EKG QRS DURATION: 86 MS
EKG QTC CALCULATION (BAZETT): 456 MS
EKG QTC CALCULATION (BAZETT): 526 MS
EKG R AXIS: 24 DEGREES
EKG R AXIS: 38 DEGREES
EKG T AXIS: -10 DEGREES
EKG T AXIS: -9 DEGREES
EKG VENTRICULAR RATE: 101 BPM
EKG VENTRICULAR RATE: 80 BPM
EOSINOPHIL # BLD: 0.4 K/UL (ref 0–0.4)
EOSINOPHIL NFR BLD: 4.8 % (ref 0.7–5.8)
ERYTHROCYTE [DISTWIDTH] IN BLOOD BY AUTOMATED COUNT: 12.3 % (ref 11.7–14.4)
GLUCOSE SERPL-MCNC: 113 MG/DL (ref 70–99)
HCT VFR BLD AUTO: 36.7 % (ref 37–47)
HGB BLD-MCNC: 12.8 G/DL (ref 11.2–15.7)
IMM GRANULOCYTES # BLD: 0.1 K/UL
IMM GRANULOCYTES NFR BLD: 0.6 %
LYMPHOCYTES # BLD: 2.8 K/UL (ref 1.2–3.7)
LYMPHOCYTES NFR BLD: 33.5 %
MAGNESIUM SERPL-MCNC: 2 MG/DL (ref 1.7–2.4)
MCH RBC QN AUTO: 30 PG (ref 25.6–32.2)
MCHC RBC AUTO-ENTMCNC: 34.9 % (ref 32.2–35.5)
MCV RBC AUTO: 85.9 FL (ref 79.4–94.8)
MONOCYTES # BLD: 0.9 K/UL (ref 0.2–0.9)
MONOCYTES NFR BLD: 10.3 % (ref 4.7–12.5)
NEUTROPHILS # BLD: 4.2 K/UL (ref 1.6–6.1)
NEUTS SEG NFR BLD: 50.3 % (ref 34–71.1)
PHOSPHATE SERPL-MCNC: 2.9 MG/DL (ref 2.3–4.8)
PLATELET # BLD AUTO: 361 K/UL (ref 182–369)
POTASSIUM SERPL-SCNC: 2.9 MEQ/L (ref 3.4–4.9)
PROT SERPL-MCNC: 5.7 G/DL (ref 6.3–8)
RBC # BLD AUTO: 4.27 M/UL (ref 3.93–5.22)
SODIUM SERPL-SCNC: 129 MEQ/L (ref 135–144)
TROPONIN, HIGH SENSITIVITY: 19 NG/L (ref 0–19)
TSH SERPL-MCNC: 0.99 UIU/ML (ref 0.44–3.86)
WBC # BLD AUTO: 8.4 K/UL (ref 4–10)

## 2025-08-08 PROCEDURE — 1210000000 HC MED SURG R&B

## 2025-08-08 PROCEDURE — 6360000002 HC RX W HCPCS: Performed by: INTERNAL MEDICINE

## 2025-08-08 PROCEDURE — 83735 ASSAY OF MAGNESIUM: CPT

## 2025-08-08 PROCEDURE — 84100 ASSAY OF PHOSPHORUS: CPT

## 2025-08-08 PROCEDURE — 36415 COLL VENOUS BLD VENIPUNCTURE: CPT

## 2025-08-08 PROCEDURE — 97535 SELF CARE MNGMENT TRAINING: CPT

## 2025-08-08 PROCEDURE — 97166 OT EVAL MOD COMPLEX 45 MIN: CPT

## 2025-08-08 PROCEDURE — 85025 COMPLETE CBC W/AUTO DIFF WBC: CPT

## 2025-08-08 PROCEDURE — 84484 ASSAY OF TROPONIN QUANT: CPT

## 2025-08-08 PROCEDURE — 84443 ASSAY THYROID STIM HORMONE: CPT

## 2025-08-08 PROCEDURE — 93005 ELECTROCARDIOGRAM TRACING: CPT

## 2025-08-08 PROCEDURE — 80076 HEPATIC FUNCTION PANEL: CPT

## 2025-08-08 PROCEDURE — 97530 THERAPEUTIC ACTIVITIES: CPT

## 2025-08-08 PROCEDURE — 82533 TOTAL CORTISOL: CPT

## 2025-08-08 PROCEDURE — 2580000003 HC RX 258: Performed by: INTERNAL MEDICINE

## 2025-08-08 PROCEDURE — 93010 ELECTROCARDIOGRAM REPORT: CPT | Performed by: INTERNAL MEDICINE

## 2025-08-08 PROCEDURE — 80048 BASIC METABOLIC PNL TOTAL CA: CPT

## 2025-08-08 PROCEDURE — 6370000000 HC RX 637 (ALT 250 FOR IP): Performed by: INTERNAL MEDICINE

## 2025-08-08 RX ORDER — MAGNESIUM SULFATE IN WATER 40 MG/ML
2000 INJECTION, SOLUTION INTRAVENOUS PRN
Status: DISCONTINUED | OUTPATIENT
Start: 2025-08-08 | End: 2025-08-09 | Stop reason: HOSPADM

## 2025-08-08 RX ORDER — MECOBALAMIN 5000 MCG
5 TABLET,DISINTEGRATING ORAL NIGHTLY
Status: DISCONTINUED | OUTPATIENT
Start: 2025-08-08 | End: 2025-08-09 | Stop reason: HOSPADM

## 2025-08-08 RX ORDER — SODIUM CHLORIDE 9 MG/ML
INJECTION, SOLUTION INTRAVENOUS PRN
Status: DISCONTINUED | OUTPATIENT
Start: 2025-08-08 | End: 2025-08-09 | Stop reason: HOSPADM

## 2025-08-08 RX ORDER — PANTOPRAZOLE SODIUM 40 MG/1
40 TABLET, DELAYED RELEASE ORAL
Status: DISCONTINUED | OUTPATIENT
Start: 2025-08-08 | End: 2025-08-09 | Stop reason: HOSPADM

## 2025-08-08 RX ORDER — BUTALBITAL, ACETAMINOPHEN AND CAFFEINE 300; 40; 50 MG/1; MG/1; MG/1
1 CAPSULE ORAL EVERY 4 HOURS PRN
Status: DISCONTINUED | OUTPATIENT
Start: 2025-08-08 | End: 2025-08-09 | Stop reason: HOSPADM

## 2025-08-08 RX ORDER — LANOLIN ALCOHOL/MO/W.PET/CERES
400 CREAM (GRAM) TOPICAL DAILY
Status: DISCONTINUED | OUTPATIENT
Start: 2025-08-08 | End: 2025-08-09 | Stop reason: HOSPADM

## 2025-08-08 RX ORDER — CLOPIDOGREL BISULFATE 75 MG/1
75 TABLET ORAL DAILY
Status: DISCONTINUED | OUTPATIENT
Start: 2025-08-08 | End: 2025-08-09 | Stop reason: HOSPADM

## 2025-08-08 RX ORDER — PROMETHAZINE HYDROCHLORIDE 12.5 MG/1
12.5 TABLET ORAL EVERY 6 HOURS PRN
Status: DISCONTINUED | OUTPATIENT
Start: 2025-08-08 | End: 2025-08-09 | Stop reason: HOSPADM

## 2025-08-08 RX ORDER — DOCUSATE SODIUM 100 MG/1
100 CAPSULE, LIQUID FILLED ORAL 2 TIMES DAILY
Status: DISCONTINUED | OUTPATIENT
Start: 2025-08-08 | End: 2025-08-09 | Stop reason: HOSPADM

## 2025-08-08 RX ORDER — CLONIDINE HYDROCHLORIDE 0.1 MG/1
0.1 TABLET ORAL 3 TIMES DAILY
Status: DISCONTINUED | OUTPATIENT
Start: 2025-08-08 | End: 2025-08-09 | Stop reason: HOSPADM

## 2025-08-08 RX ORDER — ACETAMINOPHEN 650 MG/1
650 SUPPOSITORY RECTAL EVERY 6 HOURS PRN
Status: DISCONTINUED | OUTPATIENT
Start: 2025-08-08 | End: 2025-08-09 | Stop reason: HOSPADM

## 2025-08-08 RX ORDER — ATORVASTATIN CALCIUM 40 MG/1
80 TABLET, FILM COATED ORAL NIGHTLY
Status: DISCONTINUED | OUTPATIENT
Start: 2025-08-08 | End: 2025-08-09 | Stop reason: HOSPADM

## 2025-08-08 RX ORDER — SERTRALINE HYDROCHLORIDE 25 MG/1
25 TABLET, FILM COATED ORAL DAILY
Status: DISCONTINUED | OUTPATIENT
Start: 2025-08-08 | End: 2025-08-09 | Stop reason: HOSPADM

## 2025-08-08 RX ORDER — SODIUM CHLORIDE 0.9 % (FLUSH) 0.9 %
10 SYRINGE (ML) INJECTION EVERY 12 HOURS SCHEDULED
Status: DISCONTINUED | OUTPATIENT
Start: 2025-08-08 | End: 2025-08-09 | Stop reason: HOSPADM

## 2025-08-08 RX ORDER — ENOXAPARIN SODIUM 100 MG/ML
40 INJECTION SUBCUTANEOUS DAILY
Status: DISCONTINUED | OUTPATIENT
Start: 2025-08-08 | End: 2025-08-09 | Stop reason: HOSPADM

## 2025-08-08 RX ORDER — ACETAMINOPHEN 325 MG/1
650 TABLET ORAL EVERY 6 HOURS PRN
Status: DISCONTINUED | OUTPATIENT
Start: 2025-08-08 | End: 2025-08-09 | Stop reason: HOSPADM

## 2025-08-08 RX ORDER — POTASSIUM CHLORIDE 1500 MG/1
40 TABLET, EXTENDED RELEASE ORAL ONCE
Status: COMPLETED | OUTPATIENT
Start: 2025-08-08 | End: 2025-08-08

## 2025-08-08 RX ORDER — POLYETHYLENE GLYCOL 3350 17 G/17G
17 POWDER, FOR SOLUTION ORAL DAILY PRN
Status: DISCONTINUED | OUTPATIENT
Start: 2025-08-08 | End: 2025-08-09 | Stop reason: HOSPADM

## 2025-08-08 RX ORDER — AMLODIPINE BESYLATE 5 MG/1
5 TABLET ORAL DAILY
Status: DISCONTINUED | OUTPATIENT
Start: 2025-08-08 | End: 2025-08-09 | Stop reason: HOSPADM

## 2025-08-08 RX ORDER — POTASSIUM CHLORIDE 1.5 G/1.58G
40 POWDER, FOR SOLUTION ORAL ONCE
Status: COMPLETED | OUTPATIENT
Start: 2025-08-08 | End: 2025-08-08

## 2025-08-08 RX ORDER — LOSARTAN POTASSIUM 25 MG/1
25 TABLET ORAL 2 TIMES DAILY
Status: DISCONTINUED | OUTPATIENT
Start: 2025-08-08 | End: 2025-08-09 | Stop reason: HOSPADM

## 2025-08-08 RX ORDER — SODIUM CHLORIDE AND POTASSIUM CHLORIDE 300; 900 MG/100ML; MG/100ML
INJECTION, SOLUTION INTRAVENOUS CONTINUOUS
Status: DISPENSED | OUTPATIENT
Start: 2025-08-08 | End: 2025-08-08

## 2025-08-08 RX ORDER — SODIUM CHLORIDE 9 MG/ML
INJECTION, SOLUTION INTRAVENOUS CONTINUOUS
Status: DISCONTINUED | OUTPATIENT
Start: 2025-08-08 | End: 2025-08-09 | Stop reason: HOSPADM

## 2025-08-08 RX ORDER — ONDANSETRON 2 MG/ML
4 INJECTION INTRAMUSCULAR; INTRAVENOUS EVERY 6 HOURS PRN
Status: DISCONTINUED | OUTPATIENT
Start: 2025-08-08 | End: 2025-08-09 | Stop reason: HOSPADM

## 2025-08-08 RX ORDER — SODIUM CHLORIDE 0.9 % (FLUSH) 0.9 %
10 SYRINGE (ML) INJECTION PRN
Status: DISCONTINUED | OUTPATIENT
Start: 2025-08-08 | End: 2025-08-09 | Stop reason: HOSPADM

## 2025-08-08 RX ADMIN — AMLODIPINE BESYLATE 5 MG: 5 TABLET ORAL at 07:54

## 2025-08-08 RX ADMIN — ATORVASTATIN CALCIUM 80 MG: 40 TABLET, FILM COATED ORAL at 01:51

## 2025-08-08 RX ADMIN — ATORVASTATIN CALCIUM 80 MG: 40 TABLET, FILM COATED ORAL at 19:55

## 2025-08-08 RX ADMIN — ENOXAPARIN SODIUM 40 MG: 100 INJECTION SUBCUTANEOUS at 07:54

## 2025-08-08 RX ADMIN — LOSARTAN POTASSIUM 25 MG: 25 TABLET, FILM COATED ORAL at 07:54

## 2025-08-08 RX ADMIN — CLOPIDOGREL BISULFATE 75 MG: 75 TABLET, FILM COATED ORAL at 07:54

## 2025-08-08 RX ADMIN — POTASSIUM CHLORIDE AND SODIUM CHLORIDE: 900; 300 INJECTION, SOLUTION INTRAVENOUS at 07:26

## 2025-08-08 RX ADMIN — DOCUSATE SODIUM 100 MG: 100 CAPSULE, LIQUID FILLED ORAL at 07:54

## 2025-08-08 RX ADMIN — PANTOPRAZOLE SODIUM 40 MG: 40 TABLET, DELAYED RELEASE ORAL at 05:46

## 2025-08-08 RX ADMIN — ACETAMINOPHEN 650 MG: 325 TABLET ORAL at 20:16

## 2025-08-08 RX ADMIN — Medication 5 MG: at 01:51

## 2025-08-08 RX ADMIN — Medication 5 MG: at 19:59

## 2025-08-08 RX ADMIN — SERTRALINE 25 MG: 25 TABLET, FILM COATED ORAL at 07:54

## 2025-08-08 RX ADMIN — POTASSIUM CHLORIDE 40 MEQ: 1500 TABLET, EXTENDED RELEASE ORAL at 07:54

## 2025-08-08 RX ADMIN — Medication 400 MG: at 07:54

## 2025-08-08 RX ADMIN — POTASSIUM CHLORIDE 40 MEQ: 1.5 FOR SOLUTION ORAL at 08:43

## 2025-08-08 RX ADMIN — BUTALBITA,ACETAMINOPHEN AND CAFFEINE 1 CAPSULE: 50; 300; 40 CAPSULE ORAL at 13:16

## 2025-08-08 RX ADMIN — SODIUM CHLORIDE: 0.9 INJECTION, SOLUTION INTRAVENOUS at 12:18

## 2025-08-08 ASSESSMENT — PAIN DESCRIPTION - DESCRIPTORS
DESCRIPTORS: ACHING
DESCRIPTORS: ACHING

## 2025-08-08 ASSESSMENT — PAIN SCALES - GENERAL
PAINLEVEL_OUTOF10: 6
PAINLEVEL_OUTOF10: 4

## 2025-08-08 ASSESSMENT — PAIN DESCRIPTION - ORIENTATION: ORIENTATION: LEFT

## 2025-08-08 ASSESSMENT — PAIN DESCRIPTION - LOCATION
LOCATION: HEAD
LOCATION: HIP;ARM

## 2025-08-09 VITALS
RESPIRATION RATE: 16 BRPM | HEIGHT: 64 IN | WEIGHT: 170 LBS | DIASTOLIC BLOOD PRESSURE: 92 MMHG | OXYGEN SATURATION: 93 % | SYSTOLIC BLOOD PRESSURE: 135 MMHG | HEART RATE: 78 BPM | TEMPERATURE: 98.4 F | BODY MASS INDEX: 29.02 KG/M2

## 2025-08-09 LAB
ANION GAP SERPL CALCULATED.3IONS-SCNC: 13 MEQ/L (ref 9–15)
BUN SERPL-MCNC: 5 MG/DL (ref 6–20)
CALCIUM SERPL-MCNC: 9 MG/DL (ref 8.5–9.9)
CHLORIDE SERPL-SCNC: 99 MEQ/L (ref 95–107)
CO2 SERPL-SCNC: 23 MEQ/L (ref 20–31)
CREAT SERPL-MCNC: 0.5 MG/DL (ref 0.5–0.9)
GLUCOSE SERPL-MCNC: 97 MG/DL (ref 70–99)
POTASSIUM SERPL-SCNC: 3.1 MEQ/L (ref 3.4–4.9)
SODIUM SERPL-SCNC: 135 MEQ/L (ref 135–144)

## 2025-08-09 PROCEDURE — 6370000000 HC RX 637 (ALT 250 FOR IP): Performed by: INTERNAL MEDICINE

## 2025-08-09 PROCEDURE — 80048 BASIC METABOLIC PNL TOTAL CA: CPT

## 2025-08-09 PROCEDURE — 6360000002 HC RX W HCPCS: Performed by: INTERNAL MEDICINE

## 2025-08-09 PROCEDURE — 36415 COLL VENOUS BLD VENIPUNCTURE: CPT

## 2025-08-09 RX ORDER — POTASSIUM CHLORIDE 1.5 G/1.58G
20 POWDER, FOR SOLUTION ORAL DAILY
Qty: 7 PACKET | Refills: 0 | Status: SHIPPED | OUTPATIENT
Start: 2025-08-09 | End: 2025-08-16

## 2025-08-09 RX ORDER — POTASSIUM CHLORIDE 1.5 G/1.58G
40 POWDER, FOR SOLUTION ORAL ONCE
Status: COMPLETED | OUTPATIENT
Start: 2025-08-09 | End: 2025-08-09

## 2025-08-09 RX ORDER — AMLODIPINE BESYLATE 5 MG/1
5 TABLET ORAL DAILY
Qty: 30 TABLET | Refills: 3 | Status: SHIPPED | OUTPATIENT
Start: 2025-08-09

## 2025-08-09 RX ADMIN — PANTOPRAZOLE SODIUM 40 MG: 40 TABLET, DELAYED RELEASE ORAL at 05:03

## 2025-08-09 RX ADMIN — CLOPIDOGREL BISULFATE 75 MG: 75 TABLET, FILM COATED ORAL at 08:08

## 2025-08-09 RX ADMIN — SERTRALINE 25 MG: 25 TABLET, FILM COATED ORAL at 08:08

## 2025-08-09 RX ADMIN — Medication 400 MG: at 08:07

## 2025-08-09 RX ADMIN — DOCUSATE SODIUM 100 MG: 100 CAPSULE, LIQUID FILLED ORAL at 08:08

## 2025-08-09 RX ADMIN — LOSARTAN POTASSIUM 25 MG: 25 TABLET, FILM COATED ORAL at 09:12

## 2025-08-09 RX ADMIN — ENOXAPARIN SODIUM 40 MG: 100 INJECTION SUBCUTANEOUS at 08:08

## 2025-08-09 RX ADMIN — AMLODIPINE BESYLATE 5 MG: 5 TABLET ORAL at 08:08

## 2025-08-09 RX ADMIN — POTASSIUM CHLORIDE 40 MEQ: 1.5 FOR SOLUTION ORAL at 08:10

## 2025-08-11 ENCOUNTER — TELEPHONE (OUTPATIENT)
Dept: INTERNAL MEDICINE | Age: 50
End: 2025-08-11

## 2025-09-03 ENCOUNTER — OFFICE VISIT (OUTPATIENT)
Dept: FAMILY MEDICINE CLINIC | Age: 50
End: 2025-09-03
Payer: MEDICAID

## 2025-09-03 ENCOUNTER — HOSPITAL ENCOUNTER (OUTPATIENT)
Dept: ULTRASOUND IMAGING | Age: 50
Discharge: HOME | End: 2025-09-05
Payer: MEDICAID

## 2025-09-03 VITALS — DIASTOLIC BLOOD PRESSURE: 86 MMHG | SYSTOLIC BLOOD PRESSURE: 134 MMHG | HEART RATE: 85 BPM | OXYGEN SATURATION: 95 %

## 2025-09-03 DIAGNOSIS — G89.0 THALAMIC PAIN SYNDROME: ICD-10-CM

## 2025-09-03 DIAGNOSIS — M79.662 PAIN AND SWELLING OF LEFT LOWER LEG: ICD-10-CM

## 2025-09-03 DIAGNOSIS — E55.9 VITAMIN D DEFICIENCY: ICD-10-CM

## 2025-09-03 DIAGNOSIS — Z86.73 HX OF ISCHEMIC RIGHT MCA STROKE: ICD-10-CM

## 2025-09-03 DIAGNOSIS — Z12.31 BREAST CANCER SCREENING BY MAMMOGRAM: ICD-10-CM

## 2025-09-03 DIAGNOSIS — Z12.11 COLON CANCER SCREENING: ICD-10-CM

## 2025-09-03 DIAGNOSIS — I10 PRIMARY HYPERTENSION: ICD-10-CM

## 2025-09-03 DIAGNOSIS — I69.398 PAIN AFTER CEREBROVASCULAR ACCIDENT (CVA): ICD-10-CM

## 2025-09-03 DIAGNOSIS — G47.00 INSOMNIA, UNSPECIFIED TYPE: ICD-10-CM

## 2025-09-03 DIAGNOSIS — Z86.73 HX OF ISCHEMIC RIGHT MCA STROKE: Primary | ICD-10-CM

## 2025-09-03 DIAGNOSIS — E27.8 ADRENAL MASS: ICD-10-CM

## 2025-09-03 DIAGNOSIS — R52 PAIN AFTER CEREBROVASCULAR ACCIDENT (CVA): ICD-10-CM

## 2025-09-03 DIAGNOSIS — I77.1 STENOSIS OF ILIAC ARTERY: ICD-10-CM

## 2025-09-03 DIAGNOSIS — F32.89 OTHER DEPRESSION: ICD-10-CM

## 2025-09-03 DIAGNOSIS — M79.89 PAIN AND SWELLING OF LEFT LOWER LEG: ICD-10-CM

## 2025-09-03 DIAGNOSIS — I87.8 VENOUS STASIS OF BOTH LOWER EXTREMITIES: ICD-10-CM

## 2025-09-03 DIAGNOSIS — K76.0 HEPATIC STEATOSIS: ICD-10-CM

## 2025-09-03 PROBLEM — R14.0 ABDOMINAL DISTENTION: Status: RESOLVED | Noted: 2025-07-04 | Resolved: 2025-09-03

## 2025-09-03 PROBLEM — F32.A DEPRESSION: Status: RESOLVED | Noted: 2025-07-10 | Resolved: 2025-09-03

## 2025-09-03 PROBLEM — E87.1 HYPONATREMIA: Status: RESOLVED | Noted: 2025-08-07 | Resolved: 2025-09-03

## 2025-09-03 PROBLEM — I63.9 CEREBROVASCULAR ACCIDENT (CVA) (HCC): Status: RESOLVED | Noted: 2025-06-11 | Resolved: 2025-09-03

## 2025-09-03 PROBLEM — B37.0 ORAL THRUSH: Status: RESOLVED | Noted: 2025-06-24 | Resolved: 2025-09-03

## 2025-09-03 PROCEDURE — 93971 EXTREMITY STUDY: CPT

## 2025-09-03 RX ORDER — SERTRALINE HYDROCHLORIDE 25 MG/1
25 TABLET, FILM COATED ORAL DAILY
Qty: 30 TABLET | Refills: 3 | Status: SHIPPED | OUTPATIENT
Start: 2025-09-03

## 2025-09-03 RX ORDER — MECOBALAMIN 5000 MCG
5 TABLET,DISINTEGRATING ORAL NIGHTLY
Qty: 30 TABLET | Refills: 0 | Status: SHIPPED | OUTPATIENT
Start: 2025-09-03

## 2025-09-03 RX ORDER — UBIDECARENONE 100 MG
100 CAPSULE ORAL DAILY
Qty: 120 CAPSULE | Refills: 5 | Status: SHIPPED | OUTPATIENT
Start: 2025-09-03

## 2025-09-03 RX ORDER — ATORVASTATIN CALCIUM 80 MG/1
80 TABLET, FILM COATED ORAL NIGHTLY
Qty: 30 TABLET | Refills: 3 | Status: SHIPPED | OUTPATIENT
Start: 2025-09-03

## 2025-09-03 RX ORDER — CHOLECALCIFEROL (VITAMIN D3) 50 MCG
2000 TABLET ORAL
Qty: 60 TABLET | Refills: 5 | Status: SHIPPED | OUTPATIENT
Start: 2025-09-03

## 2025-09-03 RX ORDER — LOSARTAN POTASSIUM AND HYDROCHLOROTHIAZIDE 12.5; 5 MG/1; MG/1
1 TABLET ORAL DAILY
Qty: 90 TABLET | Refills: 1 | Status: SHIPPED | OUTPATIENT
Start: 2025-09-03

## 2025-09-03 RX ORDER — CLOPIDOGREL BISULFATE 75 MG/1
75 TABLET ORAL DAILY
Qty: 30 TABLET | Refills: 3 | Status: SHIPPED | OUTPATIENT
Start: 2025-09-03

## 2025-09-03 RX ORDER — LANOLIN ALCOHOL/MO/W.PET/CERES
400 CREAM (GRAM) TOPICAL DAILY
Qty: 30 TABLET | Refills: 5 | Status: SHIPPED | OUTPATIENT
Start: 2025-09-03

## 2025-09-03 RX ORDER — HYDROCODONE BITARTRATE AND ACETAMINOPHEN 10; 325 MG/1; MG/1
1 TABLET ORAL EVERY 6 HOURS PRN
Qty: 28 TABLET | Refills: 0 | Status: SHIPPED | OUTPATIENT
Start: 2025-09-03 | End: 2025-09-10

## 2025-09-03 ASSESSMENT — PATIENT HEALTH QUESTIONNAIRE - PHQ9
2. FEELING DOWN, DEPRESSED OR HOPELESS: NOT AT ALL
SUM OF ALL RESPONSES TO PHQ QUESTIONS 1-9: 0
8. MOVING OR SPEAKING SO SLOWLY THAT OTHER PEOPLE COULD HAVE NOTICED. OR THE OPPOSITE, BEING SO FIGETY OR RESTLESS THAT YOU HAVE BEEN MOVING AROUND A LOT MORE THAN USUAL: NOT AT ALL
10. IF YOU CHECKED OFF ANY PROBLEMS, HOW DIFFICULT HAVE THESE PROBLEMS MADE IT FOR YOU TO DO YOUR WORK, TAKE CARE OF THINGS AT HOME, OR GET ALONG WITH OTHER PEOPLE: NOT DIFFICULT AT ALL
SUM OF ALL RESPONSES TO PHQ QUESTIONS 1-9: 0
9. THOUGHTS THAT YOU WOULD BE BETTER OFF DEAD, OR OF HURTING YOURSELF: NOT AT ALL
SUM OF ALL RESPONSES TO PHQ QUESTIONS 1-9: 0
6. FEELING BAD ABOUT YOURSELF - OR THAT YOU ARE A FAILURE OR HAVE LET YOURSELF OR YOUR FAMILY DOWN: NOT AT ALL
1. LITTLE INTEREST OR PLEASURE IN DOING THINGS: NOT AT ALL
SUM OF ALL RESPONSES TO PHQ QUESTIONS 1-9: 0
5. POOR APPETITE OR OVEREATING: NOT AT ALL
7. TROUBLE CONCENTRATING ON THINGS, SUCH AS READING THE NEWSPAPER OR WATCHING TELEVISION: NOT AT ALL
3. TROUBLE FALLING OR STAYING ASLEEP: NOT AT ALL
4. FEELING TIRED OR HAVING LITTLE ENERGY: NOT AT ALL